# Patient Record
Sex: MALE | Race: WHITE | NOT HISPANIC OR LATINO | Employment: OTHER | URBAN - METROPOLITAN AREA
[De-identification: names, ages, dates, MRNs, and addresses within clinical notes are randomized per-mention and may not be internally consistent; named-entity substitution may affect disease eponyms.]

---

## 2017-04-21 ENCOUNTER — OFFICE VISIT (OUTPATIENT)
Dept: MEDICAL GROUP | Facility: CLINIC | Age: 67
End: 2017-04-21
Payer: MEDICARE

## 2017-04-21 VITALS
HEART RATE: 107 BPM | SYSTOLIC BLOOD PRESSURE: 140 MMHG | BODY MASS INDEX: 18.48 KG/M2 | HEIGHT: 71 IN | RESPIRATION RATE: 18 BRPM | WEIGHT: 132 LBS | DIASTOLIC BLOOD PRESSURE: 82 MMHG | TEMPERATURE: 98.4 F | OXYGEN SATURATION: 95 %

## 2017-04-21 DIAGNOSIS — J44.9 CHRONIC OBSTRUCTIVE PULMONARY DISEASE, UNSPECIFIED COPD TYPE (HCC): ICD-10-CM

## 2017-04-21 DIAGNOSIS — R09.81 NASAL CONGESTION: ICD-10-CM

## 2017-04-21 PROCEDURE — 4040F PNEUMOC VAC/ADMIN/RCVD: CPT | Performed by: INTERNAL MEDICINE

## 2017-04-21 PROCEDURE — G8419 CALC BMI OUT NRM PARAM NOF/U: HCPCS | Performed by: INTERNAL MEDICINE

## 2017-04-21 PROCEDURE — 1101F PT FALLS ASSESS-DOCD LE1/YR: CPT | Performed by: INTERNAL MEDICINE

## 2017-04-21 PROCEDURE — 99213 OFFICE O/P EST LOW 20 MIN: CPT | Performed by: INTERNAL MEDICINE

## 2017-04-21 PROCEDURE — 1036F TOBACCO NON-USER: CPT | Performed by: INTERNAL MEDICINE

## 2017-04-21 PROCEDURE — G8432 DEP SCR NOT DOC, RNG: HCPCS | Performed by: INTERNAL MEDICINE

## 2017-04-21 NOTE — PROGRESS NOTES
"Subjective:  Jigar is a 66 y.o. male with the following   Past Medical History   Diagnosis Date   • Chronic airway obstruction, not elsewhere classified (CMS-HCC)       Family History   Problem Relation Age of Onset   • Family history unknown: Yes     The patient is on the following medications:   Current outpatient prescriptions:   •  polyethylene glycol 3350 (MIRALAX) Powder, MIX 17 GRAMS IN 8 OUNCES OF LIQUID THEN TAKE BY MOUTH EVERY DAY, Disp: 527 g, Rfl: 4  •  ipratropium-albuterol (DUONEB) 0.5-2.5 (3) MG/3ML nebulizer solution, USE ONE VIAL VIA NEBULIZER EVERY 4 HOURS AS NEEDED FOR SHORTNESS OF BREATH., Disp: 90 mL, Rfl: 2  •  SYMBICORT 160-4.5 MCG/ACT Aerosol, INHALE TWO PUFFS BY MOUTH TWICE DAILY, Disp: 22 g, Rfl: 4  •  PROAIR  (90 BASE) MCG/ACT Aero Soln inhalation aerosol, INHALE TWO PUFFS BY MOUTH EVERY SIX HOURS AS NEEDED FOR SHORTNESS OF BREATH, Disp: 9 g, Rfl: 2  •  acyclovir (ZOVIRAX) 5 % Ointment, Apply 1 Application to affected area(s) every 3 hours., Disp: 1 Tube, Rfl: 3  •  oxycodone immediate-release (ROXICODONE) 5 MG Tab, Take 10 mg by mouth every four hours as needed for Severe Pain., Disp: , Rfl:   •  aspirin 81 MG tablet, Take 81 mg by mouth every day., Disp: , Rfl:   •  Nutritional Supplements (ENSURE NUTRITION SHAKE) LIQD, Take 1 Bottle by mouth every day., Disp: 24 Bottle, Rfl: 12    HPI; 66-year-old male patient of Dr. Funes concerned about recent onset of shortness of breath and nasal congestion, he has been on oral inhalers for COPD denies having cough, fever or chills.   ROS:  See HPI    Blood pressure 140/82, pulse 107, temperature 36.9 °C (98.4 °F), resp. rate 18, height 1.803 m (5' 10.98\"), weight 59.875 kg (132 lb), SpO2 95 %.on RA  Objective:  Patient is well appearing and in no acute distress.  Pharynx is clear.  Neck is soft and supple with no cervical or supraclavicular lymphadenopathy, thyromegaly or masses, no JVD.  Lungs clear to auscultation bilaterally with " normal respiratory effort. Abdomen soft, non-tender on palpation,not distended. Heart regular rate and rhythm without murmur. Extremities without any clubbing, cyanosis, or edema.    Assessment and Plan:  1. Recent acute onset of shortness of breath/ nasal congestion; patient with history of COPD on oral inhalers including Symbicort and prior had a sudden onset of nasal congestion and difficulty with breathing 3 days ago, today is feeling better, lungs are clear to auscultation, pulse ox 95% on room air at rest and 93% with ambulation, not in apparent distress.       Patient is advised on preventive and supportive care regarding regarding nasal congestion and acute onset of shortness of breath, alternative therapies, oral antihistamine and saline spray for nasal congestion, continue with the inhaler as needed, in case of recurrent symptoms or worsening return for evaluation.    Please note that this dictation was created using voice recognition software. I have worked with consultants from the vendor as well as technical experts from [facility] to optimize the interface. I have made every reasonable attempt to correct obvious errors, but I expect that there are errors of grammar and possibly content that I did not discover before finalizing the note.

## 2017-04-21 NOTE — MR AVS SNAPSHOT
"        Jigar Kirkland   2017 3:00 PM   Office Visit   MRN: [id number]    Department:  [facility]   Dept Phone:  [phone]    Description:  Male : 1950   Provider:  Keith Carrillo M.D.           Allergies as of 2017     No Known Allergies      Vital Signs     Blood Pressure Pulse Temperature Respirations Height Weight    140/82 mmHg 107 36.9 °C (98.4 °F) 18 1.803 m (5' 10.98\") 59.875 kg (132 lb)    Body Mass Index Oxygen Saturation Smoking Status             18.42 kg/m2 95% Former Smoker         Basic Information     Date Of Birth Sex Race Ethnicity Preferred Language    1950 Male White Non- English      Your appointments     2017  1:20 PM   Established Patient Pul with REGGIE Robin   [facility] (--)    [address]  [address]  [facility] [id number] [phone]              Problem List              ICD-10-CM Priority Class Noted - Resolved    Unintentional weight loss R63.4   2014 - Present    Cluster headaches G44.009   2014 - Present    Neck mass R22.1   2014 - Present    COPD (chronic obstructive pulmonary disease) (CMS-HCC) J44.9   2014 - Present    Aortic aneurysm (CMS-HCC) I71.9   2014 - Present    Cough R05   2014 - Present    Need for Zostavax administration Z23   10/9/2015 - Present    Herpes labialis B00.1   10/9/2015 - Present      Health Maintenance        Date Due Completion Dates    IMM DTaP/Tdap/Td Vaccine (1 - Tdap) 1968 ---    IMM ZOSTER VACCINE 2009 ---    IMM PNEUMOCOCCAL 65+ (ADULT) LOW/MEDIUM RISK SERIES (2 of 2 - PCV13) 2015    COLONOSCOPY 2024            Current Immunizations     Influenza Vaccine Adult HD 10/9/2015, 2014  1:35 PM    Pneumococcal polysaccharide vaccine (PPSV-23) 2014      Below and/or attached are the medications your provider expects you to take. Review all of your home medications and newly ordered " medications with your provider and/or pharmacist. Follow medication instructions as directed by your provider and/or pharmacist. Please keep your medication list with you and share with your provider. Update the information when medications are discontinued, doses are changed, or new medications (including over-the-counter products) are added; and carry medication information at all times in the event of emergency situations     Allergies:  No Known Allergies          Medications  Valid as of: April 21, 2017 -  3:25 PM    Generic Name Brand Name Tablet Size Instructions for use    Acyclovir (Ointment) ZOVIRAX 5 % Apply 1 Application to affected area(s) every 3 hours.        Albuterol Sulfate (Aero Soln) PROAIR  (90 BASE) MCG/ACT INHALE TWO PUFFS BY MOUTH EVERY SIX HOURS AS NEEDED FOR SHORTNESS OF BREATH        Aspirin (Tab) aspirin 81 MG Take 81 mg by mouth every day.        Budesonide-Formoterol Fumarate (Aerosol) SYMBICORT 160-4.5 MCG/ACT INHALE TWO PUFFS BY MOUTH TWICE DAILY        Ipratropium-Albuterol (Solution) DUONEB 0.5-2.5 (3) MG/3ML USE ONE VIAL VIA NEBULIZER EVERY 4 HOURS AS NEEDED FOR SHORTNESS OF BREATH.        Nutritional Supplements (Liquid) ENSURE NUTRITION SHAKE  Take 1 Bottle by mouth every day.        OxyCODONE HCl (Tab) ROXICODONE 5 MG Take 10 mg by mouth every four hours as needed for Severe Pain.        Polyethylene Glycol 3350 (Powder) MIRALAX  MIX 17 GRAMS IN 8 OUNCES OF LIQUID THEN TAKE BY MOUTH EVERY DAY        .                 Medicines prescribed today were sent to:     [id number] - [place], [place] - [address] [facility]    [id number] [place] [address] [place] [address]    Phone: [phone] Fax: [phone]    Open 24 Hours?: No      Medication refill instructions:       If your prescription bottle indicates you have medication refills left, it is not necessary to call your provider’s office. Please contact your pharmacy and they will refill your medication.    If your prescription  bottle indicates you do not have any refills left, you may request refills at any time through one of the following ways: The online [employer] system (except Urgent Care), by calling your provider’s office, or by asking your pharmacy to contact your provider’s office with a refill request. Medication refills are processed only during regular business hours and may not be available until the next business day. Your provider may request additional information or to have a follow-up visit with you prior to refilling your medication.   *Please Note: Medication refills are assigned a new Rx number when refilled electronically. Your pharmacy may indicate that no refills were authorized even though a new prescription for the same medication is available at the pharmacy. Please request the medicine by name with the pharmacy before contacting your provider for a refill.           MyChart Status: Patient Declined

## 2017-04-24 VITALS
HEART RATE: 91 BPM | BODY MASS INDEX: 18.48 KG/M2 | RESPIRATION RATE: 16 BRPM | SYSTOLIC BLOOD PRESSURE: 122 MMHG | WEIGHT: 132 LBS | HEIGHT: 71 IN | TEMPERATURE: 97.3 F | DIASTOLIC BLOOD PRESSURE: 88 MMHG

## 2017-04-27 ENCOUNTER — OFFICE VISIT (OUTPATIENT)
Dept: PULMONOLOGY | Facility: HOSPICE | Age: 67
End: 2017-04-27
Payer: MEDICARE

## 2017-04-27 VITALS
BODY MASS INDEX: 18.76 KG/M2 | RESPIRATION RATE: 16 BRPM | WEIGHT: 134 LBS | SYSTOLIC BLOOD PRESSURE: 102 MMHG | HEART RATE: 86 BPM | OXYGEN SATURATION: 96 % | DIASTOLIC BLOOD PRESSURE: 86 MMHG | HEIGHT: 71 IN

## 2017-04-27 DIAGNOSIS — J44.9 CHRONIC OBSTRUCTIVE PULMONARY DISEASE, UNSPECIFIED COPD TYPE (HCC): ICD-10-CM

## 2017-04-27 DIAGNOSIS — I71.9 AORTIC ANEURYSM WITHOUT RUPTURE, UNSPECIFIED PORTION OF AORTA (HCC): ICD-10-CM

## 2017-04-27 PROCEDURE — 1036F TOBACCO NON-USER: CPT | Performed by: NURSE PRACTITIONER

## 2017-04-27 PROCEDURE — G8420 CALC BMI NORM PARAMETERS: HCPCS | Performed by: NURSE PRACTITIONER

## 2017-04-27 PROCEDURE — G8432 DEP SCR NOT DOC, RNG: HCPCS | Performed by: NURSE PRACTITIONER

## 2017-04-27 PROCEDURE — 1101F PT FALLS ASSESS-DOCD LE1/YR: CPT | Performed by: NURSE PRACTITIONER

## 2017-04-27 PROCEDURE — 99214 OFFICE O/P EST MOD 30 MIN: CPT | Performed by: NURSE PRACTITIONER

## 2017-04-27 PROCEDURE — 4040F PNEUMOC VAC/ADMIN/RCVD: CPT | Performed by: NURSE PRACTITIONER

## 2017-04-27 RX ORDER — BUDESONIDE AND FORMOTEROL FUMARATE DIHYDRATE 160; 4.5 UG/1; UG/1
2 AEROSOL RESPIRATORY (INHALATION) 2 TIMES DAILY
Qty: 1 INHALER | Refills: 11 | Status: SHIPPED | OUTPATIENT
Start: 2017-04-27 | End: 2018-03-19

## 2017-04-27 RX ORDER — METHYLPREDNISOLONE 4 MG/1
TABLET ORAL
Qty: 21 TAB | Refills: 0 | Status: SHIPPED | OUTPATIENT
Start: 2017-04-27 | End: 2017-07-20 | Stop reason: SDUPTHER

## 2017-04-27 RX ORDER — ZOLPIDEM TARTRATE 5 MG/1
5 TABLET ORAL NIGHTLY PRN
Qty: 3 TAB | Refills: 0 | Status: SHIPPED | OUTPATIENT
Start: 2017-04-27 | End: 2017-07-17 | Stop reason: SDUPTHER

## 2017-04-27 RX ORDER — AZITHROMYCIN 250 MG/1
TABLET, FILM COATED ORAL
Qty: 6 TAB | Refills: 0 | Status: SHIPPED | OUTPATIENT
Start: 2017-04-27 | End: 2017-05-15 | Stop reason: SDUPTHER

## 2017-04-27 RX ORDER — ALBUTEROL SULFATE 90 UG/1
2 AEROSOL, METERED RESPIRATORY (INHALATION) EVERY 6 HOURS PRN
Qty: 9 G | Refills: 2 | Status: SHIPPED | OUTPATIENT
Start: 2017-04-27 | End: 2017-11-21 | Stop reason: SDUPTHER

## 2017-04-27 NOTE — PATIENT INSTRUCTIONS
1) Continue Symbicort 160/4.5,   2) Clean mask and supplies weekly and change them as insurance allows  3) Vaccines: Up to date with Pneumovax 23  4) Return in about 2 months (around 6/27/2017) for review of symptoms, if not sooner, follow up with LU Voss.   5) Home sleep study

## 2017-04-27 NOTE — MR AVS SNAPSHOT
"        Jigar Kirkland   2017 1:20 PM   Office Visit   MRN: [id number]    Department:  [facility]   Dept Phone:  [phone]    Description:  Male : 1950   Provider:  REGGIE Robin           Reason for Visit     Follow-Up           Allergies as of 2017     Allergen Noted Reactions    Spiriva 2017         You were diagnosed with     Chronic obstructive pulmonary disease, unspecified COPD type (CMS-HCC)   [[id number]]         Vital Signs     Blood Pressure Pulse Respirations Height Weight Body Mass Index    102/86 mmHg 86 16 1.803 m (5' 10.98\") 60.782 kg (134 lb) 18.70 kg/m2    Oxygen Saturation Smoking Status                96% Former Smoker          Basic Information     Date Of Birth Sex Race Ethnicity Preferred Language    1950 Male White Non- English      Your appointments     2017 11:20 AM   Established Patient Pul with REGGIE Robin   [facility] (--)    [address] [facility] [address]  [facility] [id number] [phone]              Problem List              ICD-10-CM Priority Class Noted - Resolved    Unintentional weight loss R63.4   2014 - Present    Cluster headaches G44.009   2014 - Present    Neck mass R22.1   2014 - Present    COPD (chronic obstructive pulmonary disease) (CMS-HCC) J44.9   2014 - Present    Aortic aneurysm (CMS-HCC) I71.9   2014 - Present    Cough R05   2014 - Present    Need for Zostavax administration Z23   10/9/2015 - Present    Herpes labialis B00.1   10/9/2015 - Present      Health Maintenance        Date Due Completion Dates    IMM DTaP/Tdap/Td Vaccine (1 - Tdap) 1968 ---    IMM ZOSTER VACCINE 2009 ---    IMM PNEUMOCOCCAL 65+ (ADULT) LOW/MEDIUM RISK SERIES (2 of 2 - PCV13) 2015    COLONOSCOPY 2024            Current Immunizations     Influenza Vaccine Adult HD 10/9/2015, 2014  1:35 PM    Pneumococcal polysaccharide " vaccine (PPSV-23) 9/22/2014      Below and/or attached are the medications your provider expects you to take. Review all of your home medications and newly ordered medications with your provider and/or pharmacist. Follow medication instructions as directed by your provider and/or pharmacist. Please keep your medication list with you and share with your provider. Update the information when medications are discontinued, doses are changed, or new medications (including over-the-counter products) are added; and carry medication information at all times in the event of emergency situations     Allergies:  SPIRIVA - (reactions not documented)               Medications  Valid as of: April 27, 2017 -  1:59 PM    Generic Name Brand Name Tablet Size Instructions for use    Acyclovir (Ointment) ZOVIRAX 5 % Apply 1 Application to affected area(s) every 3 hours.        Albuterol Sulfate (Aero Soln) albuterol 108 (90 BASE) MCG/ACT Inhale 2 Puffs by mouth every 6 hours as needed.        Aspirin (Tab) aspirin 81 MG Take 81 mg by mouth every day.        Azithromycin (Tab) ZITHROMAX 250 MG Take 2 tablets on day 1, then take 1 tablet a day for 4 days.        Budesonide-Formoterol Fumarate (Aerosol) SYMBICORT 160-4.5 MCG/ACT Inhale 2 Puffs by mouth 2 Times a Day.        Ipratropium-Albuterol (Solution) DUONEB 0.5-2.5 (3) MG/3ML USE ONE VIAL VIA NEBULIZER EVERY 4 HOURS AS NEEDED FOR SHORTNESS OF BREATH.        MethylPREDNISolone (Tablet Therapy Pack) MEDROL DOSEPAK 4 MG Instructions per package        Nutritional Supplements (Liquid) ENSURE NUTRITION SHAKE  Take 1 Bottle by mouth every day.        OxyCODONE HCl (Tab) ROXICODONE 5 MG Take 10 mg by mouth every four hours as needed for Severe Pain.        Polyethylene Glycol 3350 (Powder) MIRALAX  MIX 17 GRAMS IN 8 OUNCES OF LIQUID THEN TAKE BY MOUTH EVERY DAY        Zolpidem Tartrate (Tab) AMBIEN 5 MG Take 1 Tab by mouth at bedtime as needed for Sleep (1 to 3 po qhs prn insomnia/sleep  study. Bring to sleep study.).        .                 Medicines prescribed today were sent to:     [id number] - [place], [place] [address] [address]    [address] [place] [address]    Phone: [phone] Fax: [phone]    Open 24 Hours?: No      Medication refill instructions:       If your prescription bottle indicates you have medication refills left, it is not necessary to call your provider’s office. Please contact your pharmacy and they will refill your medication.    If your prescription bottle indicates you do not have any refills left, you may request refills at any time through one of the following ways: The online [employer] system (except Urgent Care), by calling your provider’s office, or by asking your pharmacy to contact your provider’s office with a refill request. Medication refills are processed only during regular business hours and may not be available until the next business day. Your provider may request additional information or to have a follow-up visit with you prior to refilling your medication.   *Please Note: Medication refills are assigned a new Rx number when refilled electronically. Your pharmacy may indicate that no refills were authorized even though a new prescription for the same medication is available at the pharmacy. Please request the medicine by name with the pharmacy before contacting your provider for a refill.        Your To Do List     Future Labs/Procedures Complete By Expires    OVERNIGHT HOME SLEEP STUDY  As directed 4/27/2018      Instructions    1) Continue Symbicort 160/4.5,   2) Clean mask and supplies weekly and change them as insurance allows  3) Vaccines: Up to date with Pneumovax 23  4) Return in about 2 months (around 6/27/2017) for review of symptoms, if not sooner, follow up with LU Voss.   5) Home sleep study              MyChart Status: Patient Declined

## 2017-04-27 NOTE — PROGRESS NOTES
CC:  Here for f/u pulmonary issues as listed below    HPI:   Jigar presents today for follow up for emphysema. PFTs from 2015 indicate a Fev1 of 1.71L or 48% predicted without bronchodilator response, Fev1/FVC ratio of 41, DLCO 58%. He is a smoker of 45 pack years who quit almost 2 years ago. Had CAT scan performed September 1, 2015 with no evidence of thrombosis however demonstrates severe emphysema. Patient has not been seen since one year ago and required his antibiotic and Medrol emergency one time. Apparently one week ago he was getting out of the shower and acutely had difficulty breathing with feelings of his neck being squeezed. He reports that his spouse was not near him he would have been on the floor. He attempted his nebulizer and rescue inhaler without benefit at that time. He refused to go to the hospital. Symptoms eventually went away and have not occurred since that time. However, he is afraid of taking a shower again. He saw his primary who felt the incident was allergy related and gave him Claritin which seemed to help per the patient. He is case with Dr. Tillman who does not believe this is pulmonary related and may be related to anxiety. Patient has a history of a aneurysm and encouraged to follow-up with cardiology. In general patient is using Symbicort 160-4.52 puffs twice a day with mouth rinse, nebulizer as needed with sickness, rescue inhaler rarely.    Patient is currently sleeping 2-3 hours then he wakes, because of tinnitus to go bathroom and with 2-3x nighttime awakenings. They sometimes have trouble falling asleep.  They do not feel refreshed in the morning and denies morning H/A. They feel tired throughout the day and tries naps for appx 1 hour long.  Patient reports snoring, apnea events and paroxysmal nocturnal dyspnea events. They have never fallen asleep in conversation, at the wheel, or at work.  They deny sleepwalking/talking. Patient was highly recommended complete a sleep  study, but declined an in lab study, reporting that he he would rather see his cardiologist before completing it. Reviewed pathophysiology of untreated SAMUEL including cardiac and neurological risk factors. Amenable to a home sleep study.      Patient Active Problem List    Diagnosis Date Noted   • Need for Zostavax administration 10/09/2015   • Herpes labialis 10/09/2015   • Cough 12/31/2014   • Aortic aneurysm (CMS-HCC) 11/04/2014   • Unintentional weight loss 09/22/2014   • Cluster headaches 09/22/2014   • Neck mass 09/22/2014   • COPD (chronic obstructive pulmonary disease) (CMS-HCC) 09/22/2014       Past Medical History   Diagnosis Date   • Chronic airway obstruction, not elsewhere classified (CMS-HCC)    • Bronchitis    • COPD (chronic obstructive pulmonary disease) (CMS-HCC)    • Aortic aneurysm (CMS-HCC)        Past Surgical History   Procedure Laterality Date   • Knee arthroscopy     • Knee orif         Family History   Problem Relation Age of Onset   • Cancer Father      Type unspec.   • Cancer Mother      Type unspec.       Social History   Substance Use Topics   • Smoking status: Former Smoker -- 1.00 packs/day for 45 years     Types: Cigarettes   • Smokeless tobacco: Never Used   • Alcohol Use: No       Current Outpatient Prescriptions   Medication Sig Dispense Refill   • MethylPREDNISolone (MEDROL DOSEPAK) 4 MG Tablet Therapy Pack Instructions per package 21 Tab 0   • azithromycin (ZITHROMAX) 250 MG Tab Take 2 tablets on day 1, then take 1 tablet a day for 4 days. 6 Tab 0   • albuterol (PROAIR HFA) 108 (90 BASE) MCG/ACT Aero Soln inhalation aerosol Inhale 2 Puffs by mouth every 6 hours as needed. 9 g 2   • budesonide-formoterol (SYMBICORT) 160-4.5 MCG/ACT Aerosol Inhale 2 Puffs by mouth 2 Times a Day. 1 Inhaler 11   • zolpidem (AMBIEN) 5 MG Tab Take 1 Tab by mouth at bedtime as needed for Sleep (1 to 3 po qhs prn insomnia/sleep study. Bring to sleep study.). 3 Tab 0   • polyethylene glycol 3350 (MIRALAX)  "Powder MIX 17 GRAMS IN 8 OUNCES OF LIQUID THEN TAKE BY MOUTH EVERY  g 4   • ipratropium-albuterol (DUONEB) 0.5-2.5 (3) MG/3ML nebulizer solution USE ONE VIAL VIA NEBULIZER EVERY 4 HOURS AS NEEDED FOR SHORTNESS OF BREATH. 90 mL 2   • Nutritional Supplements (ENSURE NUTRITION SHAKE) LIQD Take 1 Bottle by mouth every day. 24 Bottle 12   • acyclovir (ZOVIRAX) 5 % Ointment Apply 1 Application to affected area(s) every 3 hours. 1 Tube 3   • oxycodone immediate-release (ROXICODONE) 5 MG Tab Take 10 mg by mouth every four hours as needed for Severe Pain.     • aspirin 81 MG tablet Take 81 mg by mouth every day.       No current facility-administered medications for this visit.          Allergies: Spiriva          ROS   Gen: Denies fever, chills, unintentional weight loss, fatigue, night sweats  E/N/T: Denies nasal congestion, ear pain  Resp:Denies Dyspnea, wheezing, cough, sputum production, hemoptysis  CV: Denies chest pain, chest tightness, palpitations  Sleep:Denies morning headache  Neuro: Denies frequent headaches, weakness, dizziness  GI: Denies acid reflux, N/V  See HPI.  All other systems reviewed and negative          Vital signs for this encounter:  Filed Vitals:    04/27/17 1308   Height: 1.803 m (5' 10.98\")   Weight: 60.782 kg (134 lb)   Weight % change since last entry.: 0 %   BP: 102/86   Pulse: 86   BMI (Calculated): 18.7   Resp: 16                 Physical Exam:   Appearance: well developed, well nourished, no acute distress.   Eyes: PERRL, EOM intact, sclere white, conjunctiva moist.  Ears: no lesions or deformities.  Hearing: grossly intact.  Nose: no lesions or deformities.  Dentition: good dentition.   Oropharynx: tongue normal, posterior pharynx without erythema or exudate.  Neck: supple, trachea midline, no masses.  Respiratory effort: no intercostal retractions or use of accessory muscles.  Lung auscultation: Bilateral diminished   Heart auscultation: no murmur, rub, or gallop   Extremities: no " cyanosis or edema.  Abdomen: soft, non-tender, no masses.  Gait and station: without difficulty   Digits and Nails: no clubbing, cyanosis, petechiae, or nodes.  Cranial nerves: grossly normal.  Motor: no focal deficits observed.   Skin: no rashes, lesions, or ulcers noted.  Orientation: oriented to time, place, and person.  Mood and affect: mood and affect appropriate, normal interaction with examiner.      Assessment   1. Chronic obstructive pulmonary disease, unspecified COPD type (CMS-[facility])  MethylPREDNISolone (MEDROL DOSEPAK) 4 MG Tablet Therapy Pack    azithromycin (ZITHROMAX) 250 MG Tab    albuterol (PROAIR HFA) 108 (90 BASE) MCG/ACT Aero Soln inhalation aerosol    budesonide-formoterol (SYMBICORT) 160-4.5 MCG/ACT Aerosol    zolpidem (AMBIEN) 5 MG Tab    OVERNIGHT HOME SLEEP STUDY    CANCELED: POLYSOMNOGRAPHY, 4 OR MORE   2. Aortic aneurysm without rupture, unspecified portion of aorta (CMS-HCC)         PLAN:   Patient Instructions   1) Continue Symbicort 160/4.5,   2) Vaccines: Up to date with Pneumovax 23  3) Return in about 2 months (around 6/27/2017) for review of symptoms, if not sooner, follow up with LU Voss.   4) Home sleep study

## 2017-05-13 ENCOUNTER — PATIENT OUTREACH (OUTPATIENT)
Dept: HEALTH INFORMATION MANAGEMENT | Facility: OTHER | Age: 67
End: 2017-05-13

## 2017-05-13 NOTE — PROGRESS NOTES
Outcome: Requested to Call us Back -- Patient needs to coordinate his schedule with his wife's.    WebIZ Checked & Epic Updated:   yes    [facility] Verified: no    Attempt # 1st

## 2017-05-15 DIAGNOSIS — R09.89 CHEST CONGESTION: ICD-10-CM

## 2017-05-15 DIAGNOSIS — J44.9 CHRONIC OBSTRUCTIVE PULMONARY DISEASE, UNSPECIFIED COPD TYPE (HCC): ICD-10-CM

## 2017-05-17 DIAGNOSIS — J44.1 CHRONIC OBSTRUCTIVE PULMONARY DISEASE WITH ACUTE EXACERBATION (HCC): ICD-10-CM

## 2017-05-17 RX ORDER — AZITHROMYCIN 250 MG/1
TABLET, FILM COATED ORAL
Qty: 6 TAB | Refills: 0 | Status: SHIPPED | OUTPATIENT
Start: 2017-05-17 | End: 2017-07-20

## 2017-05-17 RX ORDER — METHYLPREDNISOLONE 4 MG/1
TABLET ORAL
Qty: 21 TAB | Refills: 0 | OUTPATIENT
Start: 2017-05-17

## 2017-05-17 NOTE — PROGRESS NOTES
"Spoke with patient. He continues to have \"throat squeezing\" sensation.  Requesting another round of antibiotics and medrol pack as he had relief with them.  He continues to cough up thick mucus.  Will order another zpack and chest CT for further evaluation of symptoms.  Also enforced trialing x 2 weeks of acid reflux medication, which he was amendable to.   "

## 2017-05-17 NOTE — TELEPHONE ENCOUNTER
Patient is calling stating he is not feeling better since he was seen by Iman Culver on 4/27/2017. He is wondering if he could have another round of antibiotics and medrol dospak sent to his pharmacy.    Please Advise

## 2017-05-24 NOTE — PROGRESS NOTES
5/24/17 -  Outcome: Pt said that he will call back. He said that he already has all our info and our phone number and declined to verify demographics.    Attempt # 4

## 2017-06-27 ENCOUNTER — TELEPHONE (OUTPATIENT)
Dept: PULMONOLOGY | Facility: HOSPICE | Age: 67
End: 2017-06-27

## 2017-06-27 NOTE — TELEPHONE ENCOUNTER
"Pt has an appt with LU Voss for HST results. Results are not in the system and was not scheduled. I spoke to the patient and offered if he would like to keep appt or reschedule and schedule HST. Patient agreed to reschedule and schedule HST. Patient stated \"he feels as he sleeps fine and doesn't wake up gasping for air\" I advised it is what Iman had ordered and wanted completed the patient agreed. Patient would like address for sleep center mailed conformed address over the phone also address was given during call. Address to sleep center mailed patient aware of time and location of both appts   "

## 2017-07-17 DIAGNOSIS — J44.9 CHRONIC OBSTRUCTIVE PULMONARY DISEASE, UNSPECIFIED COPD TYPE (HCC): ICD-10-CM

## 2017-07-17 DIAGNOSIS — G47.33 OBSTRUCTIVE SLEEP APNEA: ICD-10-CM

## 2017-07-17 RX ORDER — ZOLPIDEM TARTRATE 5 MG/1
5 TABLET ORAL NIGHTLY PRN
Qty: 3 TAB | Refills: 0 | Status: SHIPPED
Start: 2017-07-17 | End: 2017-07-20

## 2017-07-17 NOTE — TELEPHONE ENCOUNTER
Prescription faxed to:    [id number] - [place] - [address] [address]  [address] [address] [address]  Phone: [phone] Fax: [phone]  .

## 2017-07-17 NOTE — TELEPHONE ENCOUNTER
Have we ever prescribed this med? Yes.  If yes, what date? 4/27/2017    Last OV: 4/27/2017    Next OV: 7/20/2017    DX: SAMUEL    Medications: Ambien 5 mg for sleep study

## 2017-07-18 ENCOUNTER — HOME STUDY (OUTPATIENT)
Dept: SLEEP MEDICINE | Facility: MEDICAL CENTER | Age: 67
End: 2017-07-18
Attending: NURSE PRACTITIONER
Payer: MEDICARE

## 2017-07-18 DIAGNOSIS — J44.9 CHRONIC OBSTRUCTIVE PULMONARY DISEASE, UNSPECIFIED COPD TYPE (HCC): ICD-10-CM

## 2017-07-18 PROCEDURE — G0399 HOME SLEEP TEST/TYPE 3 PORTA: HCPCS | Performed by: INTERNAL MEDICINE

## 2017-07-18 NOTE — MR AVS SNAPSHOT
Jigar QIU Marita   2017 3:30 PM   Appointment   MRN: [id number]    Department:  [facility]   Dept Phone:  [phone]    Description:  Male : 1950   Provider:  SLEEP CLINIC           Allergies as of 2017     Allergen Noted Reactions    Spiriva 2017         You were diagnosed with     Chronic obstructive pulmonary disease, unspecified COPD type (CMS-HCC)   [0170249]         Vital Signs     Smoking Status                   Former Smoker           Basic Information     Date Of Birth Sex Race Ethnicity Preferred Language    1950 Male White Non- English      Your appointments     2017  3:30 PM   Sleep Study Home with SLEEP CLINIC   [facility] (--)    [address]  [address]  [place] [address] [phone]            2017  1:20 PM   Established Patient Pul with REGGIE Robin   [facility] (--)    [address]  [address]  [place] [address] [phone]              Problem List              ICD-10-CM Priority Class Noted - Resolved    Unintentional weight loss R63.4   2014 - Present    Cluster headaches G44.009   2014 - Present    Neck mass R22.1   2014 - Present    COPD (chronic obstructive pulmonary disease) (CMS-HCC) J44.9   2014 - Present    Aortic aneurysm (CMS-HCC) I71.9   2014 - Present    Cough R05   2014 - Present    Need for Zostavax administration Z23   10/9/2015 - Present    Herpes labialis B00.1   10/9/2015 - Present      Health Maintenance        Date Due Completion Dates    IMM DTaP/Tdap/Td Vaccine (1 - Tdap) 1968 ---    IMM PNEUMOCOCCAL 65+ (ADULT) LOW/MEDIUM RISK SERIES (2 of 2 - PCV13) 2015    IMM INFLUENZA (1) 2017 10/9/2015, 2014    COLONOSCOPY 2024            Current Immunizations     Influenza Vaccine Adult HD 10/9/2015, 2014  1:35 PM    Pneumococcal polysaccharide vaccine (PPSV-23)  9/22/2014    SHINGLES VACCINE 12/8/2015      Below and/or attached are the medications your provider expects you to take. Review all of your home medications and newly ordered medications with your provider and/or pharmacist. Follow medication instructions as directed by your provider and/or pharmacist. Please keep your medication list with you and share with your provider. Update the information when medications are discontinued, doses are changed, or new medications (including over-the-counter products) are added; and carry medication information at all times in the event of emergency situations     Allergies:  SPIRIVA - (reactions not documented)               Medications  Valid as of: July 18, 2017 -  3:19 PM    Generic Name Brand Name Tablet Size Instructions for use    Acyclovir (Ointment) ZOVIRAX 5 % Apply 1 Application to affected area(s) every 3 hours.        Albuterol Sulfate (Aero Soln) albuterol 108 (90 BASE) MCG/ACT Inhale 2 Puffs by mouth every 6 hours as needed.        Aspirin (Tab) aspirin 81 MG Take 81 mg by mouth every day.        Azithromycin (Tab) ZITHROMAX 250 MG TAKE 2 TABLETS BY MOUTH ON DAY 1 THEN TAKE 1 TABLET ONCE DAILY FOR 4 DAYS        Budesonide-Formoterol Fumarate (Aerosol) SYMBICORT 160-4.5 MCG/ACT Inhale 2 Puffs by mouth 2 Times a Day.        Ipratropium-Albuterol (Solution) DUONEB 0.5-2.5 (3) MG/3ML USE ONE VIAL VIA NEBULIZER EVERY 4 HOURS AS NEEDED FOR SHORTNESS OF BREATH.        MethylPREDNISolone (Tablet Therapy Pack) MEDROL DOSEPAK 4 MG Instructions per package        Nutritional Supplements (Liquid) ENSURE NUTRITION SHAKE  Take 1 Bottle by mouth every day.        OxyCODONE HCl (Tab) ROXICODONE 5 MG Take 10 mg by mouth every four hours as needed for Severe Pain.        Polyethylene Glycol 3350 (Powder) MIRALAX  MIX 17 GRAMS IN 8 OUNCES OF LIQUID THEN TAKE BY MOUTH EVERY DAY        Zolpidem Tartrate (Tab) AMBIEN 5 MG Take 1 Tab by mouth at bedtime as needed for Sleep (1 to 3 po  [facility] prn insomnia/sleep study. Bring to sleep study.).        .                 Medicines prescribed today were sent to:     [id number] - [place], [place] - [address] [address]    [address] [place] [address]    Phone: [phone] Fax: [phone]    Open 24 Hours?: No      Medication refill instructions:       If your prescription bottle indicates you have medication refills left, it is not necessary to call your provider’s office. Please contact your pharmacy and they will refill your medication.    If your prescription bottle indicates you do not have any refills left, you may request refills at any time through one of the following ways: The online [employer] system (except Urgent Care), by calling your provider’s office, or by asking your pharmacy to contact your provider’s office with a refill request. Medication refills are processed only during regular business hours and may not be available until the next business day. Your provider may request additional information or to have a follow-up visit with you prior to refilling your medication.   *Please Note: Medication refills are assigned a new Rx number when refilled electronically. Your pharmacy may indicate that no refills were authorized even though a new prescription for the same medication is available at the pharmacy. Please request the medicine by name with the pharmacy before contacting your provider for a refill.           MyChart Status: Patient Declined

## 2017-07-20 ENCOUNTER — OFFICE VISIT (OUTPATIENT)
Dept: PULMONOLOGY | Facility: HOSPICE | Age: 67
End: 2017-07-20
Payer: MEDICARE

## 2017-07-20 VITALS
TEMPERATURE: 98.6 F | HEART RATE: 78 BPM | RESPIRATION RATE: 16 BRPM | BODY MASS INDEX: 18.2 KG/M2 | HEIGHT: 71 IN | DIASTOLIC BLOOD PRESSURE: 62 MMHG | OXYGEN SATURATION: 93 % | WEIGHT: 130 LBS | SYSTOLIC BLOOD PRESSURE: 100 MMHG

## 2017-07-20 DIAGNOSIS — J44.9 CHRONIC OBSTRUCTIVE PULMONARY DISEASE, UNSPECIFIED COPD TYPE (HCC): ICD-10-CM

## 2017-07-20 DIAGNOSIS — R05.9 COUGH: ICD-10-CM

## 2017-07-20 DIAGNOSIS — G47.34 NOCTURNAL OXYGEN DESATURATION: ICD-10-CM

## 2017-07-20 PROCEDURE — 99214 OFFICE O/P EST MOD 30 MIN: CPT | Mod: 25 | Performed by: NURSE PRACTITIONER

## 2017-07-20 PROCEDURE — 90670 PCV13 VACCINE IM: CPT | Performed by: NURSE PRACTITIONER

## 2017-07-20 PROCEDURE — G0009 ADMIN PNEUMOCOCCAL VACCINE: HCPCS | Performed by: NURSE PRACTITIONER

## 2017-07-20 RX ORDER — OXYCODONE HYDROCHLORIDE 10 MG/1
5-10 TABLET ORAL EVERY 8 HOURS PRN
COMMUNITY
Start: 2017-06-22

## 2017-07-20 RX ORDER — SULFAMETHOXAZOLE AND TRIMETHOPRIM 800; 160 MG/1; MG/1
TABLET ORAL
COMMUNITY
Start: 2017-06-12 | End: 2017-08-03

## 2017-07-20 RX ORDER — METHYLPREDNISOLONE 4 MG/1
TABLET ORAL
Qty: 21 TAB | Refills: 0 | Status: SHIPPED | OUTPATIENT
Start: 2017-07-20 | End: 2017-08-03

## 2017-07-20 NOTE — MR AVS SNAPSHOT
"        Jigar QIU Marita   2017 1:20 PM   Office Visit   MRN: [id number]    Department:  [facility]   Dept Phone:  [phone]    Description:  Male : 1950   Provider:  REGGIE Robin           Reason for Visit     COPD Last seen 17       Allergies as of 2017     Allergen Noted Reactions    Bactrim Ds 2017   Shortness of Breath    Spiriva 2017         You were diagnosed with     Nocturnal oxygen desaturation   [036795]       Chronic obstructive pulmonary disease, unspecified COPD type (CMS-HCC)   [7849534]         Vital Signs     Blood Pressure Pulse Temperature Respirations Height Weight    100/62 mmHg 78 37 °C (98.6 °F) 16 1.803 m (5' 10.98\") 58.968 kg (130 lb)    Body Mass Index Oxygen Saturation Smoking Status             18.14 kg/m2 93% Former Smoker         Basic Information     Date Of Birth Sex Race Ethnicity Preferred Language    1950 Male White Non- English      Your appointments     Sep 15, 2017  2:00 PM   Pulmonary Function Test with PFT-RM1   [facility] (--)    [address]  [address]  [place] [address] [phone]            Sep 22, 2017  2:20 PM   Established Patient Pul with REGGIE Robin   [facility] (--)    [address]  [address]  [place] [id number] [phone]              Problem List              ICD-10-CM Priority Class Noted - Resolved    Unintentional weight loss R63.4   2014 - Present    Cluster headaches G44.009   2014 - Present    Neck mass R22.1   2014 - Present    COPD (chronic obstructive pulmonary disease) (CMS-HCC) J44.9   2014 - Present    Aortic aneurysm (CMS-HCC) I71.9   2014 - Present    Cough R05   2014 - Present    Need for Zostavax administration Z23   10/9/2015 - Present    Herpes labialis B00.1   10/9/2015 - Present      Health Maintenance        Date Due Completion Dates    IMM DTaP/Tdap/Td Vaccine (1 - " Tdap) 5/8/1968 ---    IMM PNEUMOCOCCAL 65+ (ADULT) LOW/MEDIUM RISK SERIES (2 of 2 - PCV13) 9/22/2015 9/22/2014    IMM INFLUENZA (1) 9/1/2017 10/9/2015, 11/4/2014    COLONOSCOPY 12/2/2024 12/2/2014            Current Immunizations     13-VALENT PCV PREVNAR  Incomplete    Influenza Vaccine Adult HD 10/9/2015, 11/4/2014  1:35 PM    Pneumococcal polysaccharide vaccine (PPSV-23) 9/22/2014    SHINGLES VACCINE 12/8/2015      Below and/or attached are the medications your provider expects you to take. Review all of your home medications and newly ordered medications with your provider and/or pharmacist. Follow medication instructions as directed by your provider and/or pharmacist. Please keep your medication list with you and share with your provider. Update the information when medications are discontinued, doses are changed, or new medications (including over-the-counter products) are added; and carry medication information at all times in the event of emergency situations     Allergies:  BACTRIM DS - Shortness of Breath     SPIRIVA - (reactions not documented)               Medications  Valid as of: July 20, 2017 -  2:11 PM    Generic Name Brand Name Tablet Size Instructions for use    Acyclovir (Ointment) ZOVIRAX 5 % Apply 1 Application to affected area(s) every 3 hours.        Albuterol Sulfate (Aero Soln) albuterol 108 (90 BASE) MCG/ACT Inhale 2 Puffs by mouth every 6 hours as needed.        Aspirin (Tab) aspirin 81 MG Take 81 mg by mouth every day.        Budesonide-Formoterol Fumarate (Aerosol) SYMBICORT 160-4.5 MCG/ACT Inhale 2 Puffs by mouth 2 Times a Day.        Ipratropium-Albuterol (Solution) DUONEB 0.5-2.5 (3) MG/3ML USE ONE VIAL VIA NEBULIZER EVERY 4 HOURS AS NEEDED FOR SHORTNESS OF BREATH.        MethylPREDNISolone (Tablet Therapy Pack) MEDROL DOSEPAK 4 MG Instructions per package        Nutritional Supplements (Liquid) ENSURE NUTRITION SHAKE  Take 1 Bottle by mouth every day.        OxyCODONE HCl (Tab)  ROXICODONE 5 MG Take 10 mg by mouth every four hours as needed for Severe Pain.        OxyCODONE HCl (Tab) ROXICODONE 10 MG         Polyethylene Glycol 3350 (Powder) MIRALAX  MIX 17 GRAMS IN 8 OUNCES OF LIQUID THEN TAKE BY MOUTH EVERY DAY        Sulfamethoxazole-Trimethoprim (Tab) BACTRIM -160 MG         .                 Medicines prescribed today were sent to:     [id number] - [place], [place] - [address] [address]    [address] [place] [address]    Phone: [phone] Fax: [phone]    Open 24 Hours?: No      Medication refill instructions:       If your prescription bottle indicates you have medication refills left, it is not necessary to call your provider’s office. Please contact your pharmacy and they will refill your medication.    If your prescription bottle indicates you do not have any refills left, you may request refills at any time through one of the following ways: The online [employer] system (except Urgent Care), by calling your provider’s office, or by asking your pharmacy to contact your provider’s office with a refill request. Medication refills are processed only during regular business hours and may not be available until the next business day. Your provider may request additional information or to have a follow-up visit with you prior to refilling your medication.   *Please Note: Medication refills are assigned a new Rx number when refilled electronically. Your pharmacy may indicate that no refills were authorized even though a new prescription for the same medication is available at the pharmacy. Please request the medicine by name with the pharmacy before contacting your provider for a refill.        Your To Do List     Future Labs/Procedures Complete By Expires    AMB PULMONARY FUNCTION TEST/LAB  As directed 7/20/2018      Instructions    1) Continue Symbicort 160/4.5 2 puffs, twice a day. Continue rescue and nebulizer as needed    2) Vaccines: Up to date with Pneumovax  23. Prevnar 13 today  3) Start using oxygen at 2L nocturnally  4) Medrol pack for exacerbation  5) Return in about 2 months (around 9/20/2017) for review of symptoms, if not sooner, follow up with [facility] Voss, pulmonary function results.   6) Lung cancer screening referral placed               MyChart Status: Patient Declined

## 2017-07-20 NOTE — PROCEDURES
Interpretation:    This home sleep study was performed on 7/18/2017. The recording duration was 7 hours and 20 minutes, the flow evaluation duration was 7 hours and 5 minutes, and the oxygen saturation evaluation duration was 7 hours and 4 minutes.    No significant sleep disordered breathing was found. The apnea hypopnea index was 3.2 which is normal. The patient's saturations were less than 90% for 97% of the recording and saturations were as low as 82%. The saturations are less than or equal to 88% for 5 hours and 49 minutes. The heart rate varied between 62 and 133 with an average of 86 bpm. The patient slept mostly in the prone and right side position. Sporadic snoring was noted throughout the study.    Recommendation:    There is no indication for positive airway pressure. The patient may benefit from the administration of supplemental nocturnal oxygen.

## 2017-07-20 NOTE — PATIENT INSTRUCTIONS
1) Continue Symbicort 160/4.5 2 puffs, twice a day. Continue rescue and nebulizer as needed    2) Vaccines: Up to date with Pneumovax 23. Prevnar 13 today  3) Start using oxygen at 2L nocturnally  4) Medrol pack for exacerbation  5) Return in about 2 months (around 9/20/2017) for review of symptoms, if not sooner, follow up with [facility] Voss, pulmonary function results.   6) Lung cancer screening referral placed

## 2017-07-20 NOTE — PROGRESS NOTES
CC:  Here for f/u sleep and pulmonary issues as listed below    HPI:   Jigar presents today for follow up for emphysema and home sleep study results. PFTs from 2015 indicate a Fev1 of 1.71L or 48% predicted without bronchodilator response, Fev1/FVC ratio of 41, DLCO 58%. He is a smoker of 45 pack years who quit almost 2 years ago. Had CAT scan performed September 1, 2015 with no evidence of thrombosis however demonstrates severe emphysema.  Patient has a history of a aneurysm and encouraged to follow-up with cardiology. Patient is compliant using Symbicort 160-4.52 puffs twice a day with mouth rinse, has been using his rescue inhaler 3x/day since the [employer], and nebulizer. He complains of increased shortness of breath with failure smoke. He continues to have a cough. He trialed Prilosec for 2 weeks without any change. He does have nasal drainage with clear mucus. He denies wheeze, chest pain or chest tightness, fever and chills.    HSS from 7/2017 indicated an AHI of 3.2 and low oxygen of 82% with time below 88% of 5 hours and 49 minutes. He would benefit from the addition of 2 L of oxygen while sleeping.Patient is currently sleeping 2-3 hours then he wakes, because of tinnitus to go bathroom and with 2-3x nighttime awakenings. They sometimes have trouble falling asleep.  They do not feel refreshed in the morning and denies morning H/A. They feel tired throughout the day and tries naps for appx 1 hour long.  Patient reports snoring, apnea events and paroxysmal nocturnal dyspnea events. They have never fallen asleep in conversation, at the wheel, or at work.  They deny sleepwalking/talking.           Patient Active Problem List    Diagnosis Date Noted   • Nocturnal oxygen desaturation 07/20/2017   • Need for Zostavax administration 10/09/2015   • Herpes labialis 10/09/2015   • Cough 12/31/2014   • Aortic aneurysm (CMS-[facility]) 11/04/2014   • Unintentional weight loss 09/22/2014   • Cluster headaches 09/22/2014    • Neck mass 09/22/2014   • COPD (chronic obstructive pulmonary disease) (CMS-HCC) 09/22/2014       Past Medical History   Diagnosis Date   • Chronic airway obstruction, not elsewhere classified    • Bronchitis    • COPD (chronic obstructive pulmonary disease) (CMS-HCC)    • Aortic aneurysm (CMS-HCC)        Past Surgical History   Procedure Laterality Date   • Knee arthroscopy     • Knee orif         Family History   Problem Relation Age of Onset   • Cancer Father      Type unspec.   • Cancer Mother      Type unspec.       Social History     Social History   • Marital Status:      Spouse Name: N/A   • Number of Children: N/A   • Years of Education: N/A     Occupational History   • Not on file.     Social History Main Topics   • Smoking status: Former Smoker -- 1.00 packs/day for 45 years     Types: Cigarettes     Quit date: 07/20/2014   • Smokeless tobacco: Never Used   • Alcohol Use: No   • Drug Use: No   • Sexual Activity:     Partners: Female      Comment: Off and on     Other Topics Concern   • Not on file     Social History Narrative       Current Outpatient Prescriptions   Medication Sig Dispense Refill   • MethylPREDNISolone (MEDROL DOSEPAK) 4 MG Tablet Therapy Pack Instructions per package 21 Tab 0   • albuterol (PROAIR HFA) 108 (90 BASE) MCG/ACT Aero Soln inhalation aerosol Inhale 2 Puffs by mouth every 6 hours as needed. 9 g 2   • budesonide-formoterol (SYMBICORT) 160-4.5 MCG/ACT Aerosol Inhale 2 Puffs by mouth 2 Times a Day. 1 Inhaler 11   • polyethylene glycol 3350 (MIRALAX) Powder MIX 17 GRAMS IN 8 OUNCES OF LIQUID THEN TAKE BY MOUTH EVERY  g 4   • ipratropium-albuterol (DUONEB) 0.5-2.5 (3) MG/3ML nebulizer solution USE ONE VIAL VIA NEBULIZER EVERY 4 HOURS AS NEEDED FOR SHORTNESS OF BREATH. 90 mL 2   • oxycodone immediate-release (ROXICODONE) 5 MG Tab Take 10 mg by mouth every four hours as needed for Severe Pain.     • oxycodone immediate release (ROXICODONE) 10 MG immediate release  "tablet      • sulfamethoxazole-trimethoprim (BACTRIM DS) 800-160 MG tablet      • acyclovir (ZOVIRAX) 5 % Ointment Apply 1 Application to affected area(s) every 3 hours. 1 Tube 3   • aspirin 81 MG tablet Take 81 mg by mouth every day.     • Nutritional Supplements (ENSURE NUTRITION SHAKE) LIQD Take 1 Bottle by mouth every day. 24 Bottle 12     No current facility-administered medications for this visit.          Allergies: Bactrim ds and Spiriva      ROS   Gen: Denies fever, chills, unintentional weight loss, fatigue, night sweats  E/N/T: Denies ear pain, nasal congestion  Resp:Denies  wheezing, sputum production, hemoptysis  CV: Denies chest pain, chest tightness, palpitations, BLE edema  Sleep:Denies morning headache, insomnia,apnea  Neuro: Denies frequent headaches, weakness, dizziness  GI: Denies N/V, acid reflux/heartburn  See HPI.  All other systems reviewed and negative      Vital signs for this encounter:  Filed Vitals:    07/20/17 1314   Height: 1.803 m (5' 10.98\")   Weight: 58.968 kg (130 lb)   Weight % change since last entry.: 0 %   BP: 100/62   Pulse: 78   BMI (Calculated): 18.14   Resp: 16   Temp: 37 °C (98.6 °F)   O2 sat % room air: 93 %                 Physical Exam:   Appearance: well developed, well nourished, no acute distress.  Eyes: PERRL, EOM intact, sclere white, conjunctiva moist.  Ears: no lesions or deformities.  Hearing: grossly intact.  Nose: no lesions or deformities.  Dentition: good dentition.  Oropharynx: tongue normal, posterior pharynx without erythema or exudate.  Neck: supple, trachea midline, no masses.  Respiratory effort: no intercostal retractions or use of accessory muscles.  Lung auscultation: Bilateral diminished   Heart auscultation: no murmur, rub, or gallop.   Extremities: no cyanosis or edema.  Abdomen: soft, non-tender, no masses.  Gait and station: grossly normal   Digits and Nails: no clubbing, cyanosis, petechiae, or nodes.  Cranial nerves: grossly normal.  Motor: " no focal deficits observed.  Skin: no rashes, lesions, or ulcers noted.  Orientation: oriented to time, place, and person.  Mood and affect: mood and affect appropriate, normal interaction with examiner.    Assessment   1. Nocturnal oxygen desaturation  DME O2 NEW SET UP    AMB PULMONARY FUNCTION TEST/LAB    REFERRAL TO LUNG CANCER SCREENING PROGRAM    PNEUMOCOCCAL CONJUGATE VACCINE 13-VALENT   2. Chronic obstructive pulmonary disease, unspecified COPD type (CMS-HCC)  AMB PULMONARY FUNCTION TEST/LAB    MethylPREDNISolone (MEDROL DOSEPAK) 4 MG Tablet Therapy Pack    REFERRAL TO LUNG CANCER SCREENING PROGRAM    PNEUMOCOCCAL CONJUGATE VACCINE 13-VALENT   3. Cough         Patient was seen for 35 minutes, more than 50% of time spent in face to face review, counseling, and arranging future evaluation and follow up of medical conditions and care.     PLAN:   Patient Instructions   1) Continue Symbicort 160/4.5 2 puffs, twice a day. Continue rescue and nebulizer as needed    2) Vaccines: Up to date with Pneumovax 23. Prevnar 13 today  3) Start using oxygen at 2L nocturnally  4) Medrol pack for exacerbation  5) Return in about 2 months (around 9/20/2017) for review of symptoms, if not sooner, follow up with LU Voss, pulmonary function results.   6) Lung cancer screening referral placed

## 2017-07-21 ENCOUNTER — TELEPHONE (OUTPATIENT)
Dept: PULMONOLOGY | Facility: HOSPICE | Age: 67
End: 2017-07-21

## 2017-07-21 ENCOUNTER — TELEPHONE (OUTPATIENT)
Dept: HEMATOLOGY ONCOLOGY | Facility: MEDICAL CENTER | Age: 67
End: 2017-07-21

## 2017-07-21 NOTE — TELEPHONE ENCOUNTER
The pt called and he was confused on how to take the medrol dose jay.  I told him to take it per package directions as per Iman Culver's note.

## 2017-07-21 NOTE — TELEPHONE ENCOUNTER
Received referral to lung cancer screening program.  Chart review to assess for lung cancer screening program eligibility.   1. Age 55-77 yrs of age? Yes 67 y.o.  2. 30 pack year hx of smoking, or greater? Yes 1 ppdx 45 yrs=  45 pkyr hx  3. Current smoker or if quit, has pt quit within last 15 yrs?Yes  Quit 7/20/2014  4. Any signs or symptoms of lung cancer? None noted- does have COPD and severe emphysema  5. Previous history of lung cancer? None noted  6. Chest CT within past 12 mos.? None noted  Patient does meet eligibility criteria. [facility] scheduling notified to schedule the shared decision making visit.

## 2017-07-26 ENCOUNTER — OFFICE VISIT (OUTPATIENT)
Dept: HEMATOLOGY ONCOLOGY | Facility: MEDICAL CENTER | Age: 67
End: 2017-07-26
Payer: MEDICARE

## 2017-07-26 VITALS
TEMPERATURE: 98.4 F | HEART RATE: 76 BPM | HEIGHT: 71 IN | SYSTOLIC BLOOD PRESSURE: 100 MMHG | WEIGHT: 125.66 LBS | BODY MASS INDEX: 17.59 KG/M2 | RESPIRATION RATE: 16 BRPM | OXYGEN SATURATION: 92 % | DIASTOLIC BLOOD PRESSURE: 72 MMHG

## 2017-07-26 DIAGNOSIS — Z87.891 PERSONAL HISTORY OF NICOTINE DEPENDENCE: ICD-10-CM

## 2017-07-26 PROCEDURE — G0296 VISIT TO DETERM LDCT ELIG: HCPCS | Performed by: NURSE PRACTITIONER

## 2017-07-26 ASSESSMENT — ENCOUNTER SYMPTOMS
COUGH: 1
WEIGHT LOSS: 1
WHEEZING: 0
SHORTNESS OF BREATH: 1
SPUTUM PRODUCTION: 1
HEMOPTYSIS: 0

## 2017-07-26 ASSESSMENT — PAIN SCALES - GENERAL: PAINLEVEL: NO PAIN

## 2017-07-26 NOTE — MR AVS SNAPSHOT
"        Jigar QIU Marita   2017 12:00 PM   Office Visit   MRN: [id number]    Department:  [facility]   Dept Phone:  [phone]    Description:  Male : 1950   Provider:  Petra Hernandez A.P.N.           Reason for Visit     Lung Cancer Screening Program Prescreen           Allergies as of 2017     Allergen Noted Reactions    Bactrim Ds 2017   Shortness of Breath    Spiriva 2017         You were diagnosed with     Personal history of nicotine dependence   [710156]         Vital Signs     Blood Pressure Pulse Temperature Respirations Height Weight    100/72 mmHg 76 36.9 °C (98.4 °F) 16 1.803 m (5' 10.98\") 57 kg (125 lb 10.6 oz)    Body Mass Index Oxygen Saturation Smoking Status             17.53 kg/m2 92% Former Smoker         Basic Information     Date Of Birth Sex Race Ethnicity Preferred Language    1950 Male White Non- English      Your appointments     Sep 15, 2017  2:00 PM   Pulmonary Function Test with PFT-RM1   [facility] (--)    [address]  [address]  [place] [address] [phone]            Sep 22, 2017  2:20 PM   Established Patient Pul with REGGIE Robin   [facility] (--)    [address]  [address]  [place] [address] [phone]              Problem List              ICD-10-CM Priority Class Noted - Resolved    Unintentional weight loss R63.4   2014 - Present    Cluster headaches G44.009   2014 - Present    Neck mass R22.1   2014 - Present    COPD (chronic obstructive pulmonary disease) (CMS-HCC) J44.9   2014 - Present    Aortic aneurysm (CMS-HCC) I71.9   2014 - Present    Cough R05   2014 - Present    Need for Zostavax administration Z23   10/9/2015 - Present    Herpes labialis B00.1   10/9/2015 - Present    Nocturnal oxygen desaturation G47.34   2017 - Present      Health Maintenance        Date Due Completion Dates    IMM DTaP/Tdap/Td " Vaccine (1 - Tdap) 5/8/1968 ---    IMM INFLUENZA (1) 9/1/2017 10/9/2015, 11/4/2014    COLONOSCOPY 12/2/2024 12/2/2014            Current Immunizations     13-VALENT PCV PREVNAR 7/20/2017    Influenza Vaccine Adult HD 10/9/2015, 11/4/2014  1:35 PM    Pneumococcal polysaccharide vaccine (PPSV-23) 9/22/2014    SHINGLES VACCINE 12/8/2015      Below and/or attached are the medications your provider expects you to take. Review all of your home medications and newly ordered medications with your provider and/or pharmacist. Follow medication instructions as directed by your provider and/or pharmacist. Please keep your medication list with you and share with your provider. Update the information when medications are discontinued, doses are changed, or new medications (including over-the-counter products) are added; and carry medication information at all times in the event of emergency situations     Allergies:  BACTRIM DS - Shortness of Breath     SPIRIVA - (reactions not documented)               Medications  Valid as of: July 26, 2017 - 12:40 PM    Generic Name Brand Name Tablet Size Instructions for use    Acyclovir (Ointment) ZOVIRAX 5 % Apply 1 Application to affected area(s) every 3 hours.        Albuterol Sulfate (Aero Soln) albuterol 108 (90 BASE) MCG/ACT Inhale 2 Puffs by mouth every 6 hours as needed.        Aspirin (Tab) aspirin 81 MG Take 81 mg by mouth every day.        Budesonide-Formoterol Fumarate (Aerosol) SYMBICORT 160-4.5 MCG/ACT Inhale 2 Puffs by mouth 2 Times a Day.        Ipratropium-Albuterol (Solution) DUONEB 0.5-2.5 (3) MG/3ML USE ONE VIAL VIA NEBULIZER EVERY 4 HOURS AS NEEDED FOR SHORTNESS OF BREATH.        MethylPREDNISolone (Tablet Therapy Pack) MEDROL DOSEPAK 4 MG Instructions per package        Nutritional Supplements (Liquid) ENSURE NUTRITION SHAKE  Take 1 Bottle by mouth every day.        OxyCODONE HCl (Tab) ROXICODONE 5 MG Take 10 mg by mouth every four hours as needed for Severe Pain.           OxyCODONE HCl (Tab) ROXICODONE 10 MG         Polyethylene Glycol 3350 (Powder) MIRALAX  MIX 17 GRAMS IN 8 OUNCES OF LIQUID THEN TAKE BY MOUTH EVERY DAY        Sulfamethoxazole-Trimethoprim (Tab) BACTRIM -160 MG         .                 Medicines prescribed today were sent to:     [id number] - [place], [place] - [address]    [address] [place] [address]    Phone: [phone] Fax: [phone]    Open 24 Hours?: No      Medication refill instructions:       If your prescription bottle indicates you have medication refills left, it is not necessary to call your provider’s office. Please contact your pharmacy and they will refill your medication.    If your prescription bottle indicates you do not have any refills left, you may request refills at any time through one of the following ways: The online [employer] system (except Urgent Care), by calling your provider’s office, or by asking your pharmacy to contact your provider’s office with a refill request. Medication refills are processed only during regular business hours and may not be available until the next business day. Your provider may request additional information or to have a follow-up visit with you prior to refilling your medication.   *Please Note: Medication refills are assigned a new Rx number when refilled electronically. Your pharmacy may indicate that no refills were authorized even though a new prescription for the same medication is available at the pharmacy. Please request the medicine by name with the pharmacy before contacting your provider for a refill.        Your To Do List     Future Labs/Procedures Complete By Expires    CT-LUNG CANCER-SCREENING  As directed 7/26/2018         [employer] Status: Patient Declined

## 2017-07-26 NOTE — PROGRESS NOTES
Subjective:      Jigar Kirkland is a 67 y.o. male who presents with Lung Cancer Screening Program Prescreen for lung cancer screening shared decision making visit.           HPI    Patient seen today for initial lung cancer screening visit. Patient referred by his pulmonary provider LU Voss. PCP is Dr. Funes.     The patient meets eligibility criteria including age, smoking history (30+ pack years), if former smoker, quit in the last 15 years, and absence of signs or symptoms of lung cancer.    - Age - 67  - Smoking history - Patient has smoked for 50 years at an average of 1 ppd = 50 pack year smoking history.  - Current smoking status - Former smoker. Quit in July 2014, 3 years ago.   - No symptoms of lung cancer and no previous history of lung cancer     Allergies   Allergen Reactions   • Bactrim Ds Shortness of Breath   • Spiriva      Current Outpatient Prescriptions on File Prior to Visit   Medication Sig Dispense Refill   • oxycodone immediate release (ROXICODONE) 10 MG immediate release tablet      • sulfamethoxazole-trimethoprim (BACTRIM DS) 800-160 MG tablet      • MethylPREDNISolone (MEDROL DOSEPAK) 4 MG Tablet Therapy Pack Instructions per package 21 Tab 0   • albuterol (PROAIR HFA) 108 (90 BASE) MCG/ACT Aero Soln inhalation aerosol Inhale 2 Puffs by mouth every 6 hours as needed. 9 g 2   • budesonide-formoterol (SYMBICORT) 160-4.5 MCG/ACT Aerosol Inhale 2 Puffs by mouth 2 Times a Day. 1 Inhaler 11   • polyethylene glycol 3350 (MIRALAX) Powder MIX 17 GRAMS IN 8 OUNCES OF LIQUID THEN TAKE BY MOUTH EVERY  g 4   • ipratropium-albuterol (DUONEB) 0.5-2.5 (3) MG/3ML nebulizer solution USE ONE VIAL VIA NEBULIZER EVERY 4 HOURS AS NEEDED FOR SHORTNESS OF BREATH. 90 mL 2   • acyclovir (ZOVIRAX) 5 % Ointment Apply 1 Application to affected area(s) every 3 hours. 1 Tube 3   • oxycodone immediate-release (ROXICODONE) 5 MG Tab Take 10 mg by mouth every four hours as needed for Severe Pain.    "  • aspirin 81 MG tablet Take 81 mg by mouth every day.     • Nutritional Supplements (ENSURE NUTRITION SHAKE) LIQD Take 1 Bottle by mouth every day. 24 Bottle 12     No current facility-administered medications on file prior to visit.       Review of Systems   Constitutional: Positive for weight loss (noticed when he retired he has lost some weight). Negative for malaise/fatigue (his activity level is low at this time but not much fatigue noted).   Respiratory: Positive for cough (sometimes and not as severe as before), sputum production (very mild sputum production) and shortness of breath (with activity). Negative for hemoptysis and wheezing.           Objective:     /72 mmHg  Pulse 76  Temp(Src) 36.9 °C (98.4 °F)  Resp 16  Ht 1.803 m (5' 10.98\")  Wt 57 kg (125 lb 10.6 oz)  BMI 17.53 kg/m2  SpO2 92%     Physical Exam   Constitutional: He is oriented to person, place, and time. He appears well-developed and well-nourished. No distress.   HENT:   Head: Normocephalic and atraumatic.   Cardiovascular: Normal rate, regular rhythm and normal heart sounds.  Exam reveals no gallop and no friction rub.    No murmur heard.  Pulmonary/Chest: Effort normal. No respiratory distress. He has no wheezes.   Diminished throughout but more so in the bases   Musculoskeletal: Normal range of motion.   Neurological: He is alert and oriented to person, place, and time.   Skin: Skin is warm and dry. He is not diaphoretic.   Vitals reviewed.              Assessment/Plan:     1. Personal history of nicotine dependence  CT-LUNG CANCER-SCREENING         We conducted a shared decision-making process using a decision aid. We reviewed benefits and harms of screening, including false positives and potential need for additional diagnostic testing, the possibility of over diagnosis, and total radiation exposure.    We discussed the importance of adhering to annual LDCT screening. We also discussed the impact of comorbities on the " patient's the ability or willingness to undergo diagnostic procedure(s) and treatment.    Counseling on the importance of maintaining cigarette smoking abstinence if former smoker; or the importance of smoking cessation if current smoker and, if appropriate, furnishing of information about tobacco cessation interventions.    Based on our discussion, we have decided to begin annual lung cancer screening starting now.

## 2017-08-01 ENCOUNTER — TELEPHONE (OUTPATIENT)
Dept: PULMONOLOGY | Facility: HOSPICE | Age: 67
End: 2017-08-01

## 2017-08-01 DIAGNOSIS — J44.9 CHRONIC OBSTRUCTIVE PULMONARY DISEASE, UNSPECIFIED COPD TYPE (HCC): ICD-10-CM

## 2017-08-01 NOTE — TELEPHONE ENCOUNTER
Although the HSS ruled SAMUEL it can not be used as qualification for o2.  Please sign for CNOX on room air and key will get this done before the notes

## 2017-08-03 ENCOUNTER — OFFICE VISIT (OUTPATIENT)
Dept: URGENT CARE | Facility: CLINIC | Age: 67
End: 2017-08-03
Payer: MEDICARE

## 2017-08-03 VITALS
TEMPERATURE: 99.7 F | HEART RATE: 83 BPM | WEIGHT: 128 LBS | SYSTOLIC BLOOD PRESSURE: 140 MMHG | HEIGHT: 66 IN | DIASTOLIC BLOOD PRESSURE: 78 MMHG | BODY MASS INDEX: 20.57 KG/M2 | OXYGEN SATURATION: 96 %

## 2017-08-03 DIAGNOSIS — R39.11 URINARY HESITANCY: ICD-10-CM

## 2017-08-03 DIAGNOSIS — R11.2 NAUSEA AND VOMITING, INTRACTABILITY OF VOMITING NOT SPECIFIED, UNSPECIFIED VOMITING TYPE: ICD-10-CM

## 2017-08-03 LAB
APPEARANCE UR: CLEAR
BILIRUB UR STRIP-MCNC: NORMAL MG/DL
COLOR UR AUTO: YELLOW
GLUCOSE UR STRIP.AUTO-MCNC: NORMAL MG/DL
KETONES UR STRIP.AUTO-MCNC: 160 MG/DL
LEUKOCYTE ESTERASE UR QL STRIP.AUTO: NORMAL
NITRITE UR QL STRIP.AUTO: NORMAL
PH UR STRIP.AUTO: 6.5 [PH] (ref 5–8)
PROT UR QL STRIP: 100 MG/DL
RBC UR QL AUTO: NORMAL
SP GR UR STRIP.AUTO: 1
UROBILINOGEN UR STRIP-MCNC: NORMAL MG/DL

## 2017-08-03 PROCEDURE — 81002 URINALYSIS NONAUTO W/O SCOPE: CPT | Performed by: FAMILY MEDICINE

## 2017-08-03 PROCEDURE — 99214 OFFICE O/P EST MOD 30 MIN: CPT | Performed by: FAMILY MEDICINE

## 2017-08-03 RX ORDER — ONDANSETRON 4 MG/1
TABLET, ORALLY DISINTEGRATING ORAL
Qty: 15 TAB | Refills: 0 | Status: SHIPPED | OUTPATIENT
Start: 2017-08-03 | End: 2018-03-19

## 2017-08-03 RX ORDER — ONDANSETRON 2 MG/ML
4 INJECTION INTRAMUSCULAR; INTRAVENOUS ONCE
Status: COMPLETED | OUTPATIENT
Start: 2017-08-03 | End: 2017-08-03

## 2017-08-03 RX ORDER — CEFTRIAXONE 1 G/1
1 INJECTION, POWDER, FOR SOLUTION INTRAMUSCULAR; INTRAVENOUS ONCE
Status: COMPLETED | OUTPATIENT
Start: 2017-08-03 | End: 2017-08-03

## 2017-08-03 RX ORDER — CIPROFLOXACIN 500 MG/1
TABLET, FILM COATED ORAL
Qty: 14 TAB | Refills: 0 | Status: SHIPPED | OUTPATIENT
Start: 2017-08-03 | End: 2017-09-22

## 2017-08-03 RX ADMIN — ONDANSETRON 4 MG: 2 INJECTION INTRAMUSCULAR; INTRAVENOUS at 09:38

## 2017-08-03 RX ADMIN — CEFTRIAXONE 1 G: 1 INJECTION, POWDER, FOR SOLUTION INTRAMUSCULAR; INTRAVENOUS at 10:04

## 2017-08-03 NOTE — PROGRESS NOTES
Chief Complaint:    Chief Complaint   Patient presents with   • Emesis     X last night, nausea, lethargic, hard to urinate, urgency        History of Present Illness:    This is a new problem. Yesterday he started with nausea and vomiting. He reports he was still nauseated when I first saw him. No diarrhea. He reports for a few weeks he feels like has trouble urinating well and completely like could have some type of obstruction. No dysuria. He has overall felt lethargic and feverish and has temp of 99.7 F here, but has not checked his temp at home.       Review of Systems:    Constitutional: See HPI.  Eyes: Negative for change in vision, photophobia, pain, redness, and discharge.  ENT: Negative for ear pain, ear discharge, hearing loss, tinnitus, nasal congestion, nosebleeds, and sore throat.    Respiratory: Negative for cough, hemoptysis, sputum production, shortness of breath, wheezing, and stridor.    Cardiovascular: Negative for chest pain, palpitations, orthopnea, claudication, leg swelling, and PND.   Gastrointestinal: See HPI.  Genitourinary: See HPI.  Musculoskeletal: No new symptoms.  Skin: Negative for rash and itching.   Neurological: Negative for dizziness, tingling, tremors, sensory change, speech change, focal weakness, seizures, loss of consciousness, and headaches.   Endo: Negative for polydipsia.   Heme: Does not bruise/bleed easily.   Psychiatric/Behavioral: Negative for depression, suicidal ideas, hallucinations, memory loss and substance abuse. The patient is not nervous/anxious and does not have insomnia.      Past Medical History:    Past Medical History   Diagnosis Date   • Chronic airway obstruction, not elsewhere classified    • Bronchitis    • COPD (chronic obstructive pulmonary disease) (CMS-HCC)    • Aortic aneurysm (CMS-HCC)        Past Surgical History:    Past Surgical History   Procedure Laterality Date   • Knee arthroscopy     • Knee orif         Social History:    Social History  "    Social History   • Marital Status:      Spouse Name: N/A   • Number of Children: N/A   • Years of Education: N/A     Occupational History   • Not on file.     Social History Main Topics   • Smoking status: Former Smoker -- 1.00 packs/day for 50 years     Types: Cigarettes     Quit date: 07/20/2014   • Smokeless tobacco: Never Used   • Alcohol Use: No   • Drug Use: No   • Sexual Activity:     Partners: Female      Comment: Off and on     Other Topics Concern   • Not on file     Social History Narrative       Family History:    Family History   Problem Relation Age of Onset   • Cancer Father      Type unspec.   • Cancer Mother      Type unspec.       Medications:    Current Outpatient Prescriptions on File Prior to Visit   Medication Sig Dispense Refill   • oxycodone immediate release (ROXICODONE) 10 MG immediate release tablet      • budesonide-formoterol (SYMBICORT) 160-4.5 MCG/ACT Aerosol Inhale 2 Puffs by mouth 2 Times a Day. 1 Inhaler 11   • polyethylene glycol 3350 (MIRALAX) Powder MIX 17 GRAMS IN 8 OUNCES OF LIQUID THEN TAKE BY MOUTH EVERY  g 4   • ipratropium-albuterol (DUONEB) 0.5-2.5 (3) MG/3ML nebulizer solution USE ONE VIAL VIA NEBULIZER EVERY 4 HOURS AS NEEDED FOR SHORTNESS OF BREATH. 90 mL 2   • albuterol (PROAIR HFA) 108 (90 BASE) MCG/ACT Aero Soln inhalation aerosol Inhale 2 Puffs by mouth every 6 hours as needed. 9 g 2   • acyclovir (ZOVIRAX) 5 % Ointment Apply 1 Application to affected area(s) every 3 hours. 1 Tube 3   • aspirin 81 MG tablet Take 81 mg by mouth every day.     • Nutritional Supplements (ENSURE NUTRITION SHAKE) LIQD Take 1 Bottle by mouth every day. 24 Bottle 12     No current facility-administered medications on file prior to visit.       Allergies:    Allergies   Allergen Reactions   • Bactrim Ds Shortness of Breath   • Spiriva          Vitals:    Filed Vitals:    08/03/17 0914   BP: 140/78   Pulse: 83   Temp: 37.6 °C (99.7 °F)   Height: 1.664 m (5' 5.51\")   Weight: " 58.06 kg (128 lb)   SpO2: 96%       Physical Exam:    Constitutional: Vital signs reviewed. Appears well-developed and thin. Nauseated when I first saw him.  Eyes: Sclera white, conjunctivae clear.   ENT: External ears normal. Hearing normal. Nasal mucosa pink. Lips/teeth are normal. Oral mucosa pink and moist. Posterior pharynx: WNL.  Neck: Neck supple.   Cardiovascular: Regular rate and rhythm. No murmur.   Pulmonary/Chest: Respirations non-labored. Clear to auscultation bilaterally.  Abdomen: Bowel sounds are normal active. Soft, non-distended, and non-tender to palpation.   Musculoskeletal: Normal gait. Normal range of motion. No muscular atrophy or weakness.  Neurological: Alert and oriented to person, place, and time. Muscle tone normal. Coordination normal. Light touch and sensation normal.   Skin: No rashes or lesions. Warm, dry, normal turgor.  Psychiatric: Normal mood and affect. Behavior is normal. Judgment and thought content normal.     Diagnostics:     POCT URINALYSIS (Order #[id number]) on 8/3/17       Component Results      Component Value Ref Range & Units Status     POC Color Yellow Negative Final     POC Appearance CLear Negative Final     POC Leukocyte Esterase neg Negative Final     POC Nitrites neg Negative Final     POC Urobiligen neg Negative (0.2) mg/dL Final     POC Protein 100 Negative mg/dL Final     POC Urine PH 6.5 5.0 - 8.0 Final     POC Blood neg Negative Final     POC Specific Gravity 1.005 <1.005 - >1.030 Final     POC Ketones 160 Negative mg/dL Final     POC Biliruben neg Negative mg/dL Final     POC Glucose neg Negative mg/dL Final         Last Resulted Time     u Aug 3, 2017 10:10 AM       Assessment / Plan:    1. Nausea and vomiting, intractability of vomiting not specified, unspecified vomiting type  - ondansetron (ZOFRAN) syringe/vial injection 4 mg; 2 mL by Intramuscular route Once.  - ondansetron (ZOFRAN ODT) 4 MG TABLET DISPERSIBLE; 1 TAB, ALLOW TO DISINTEGRATE IN MOUTH,  EVERY 8 HOURS ONLY IF NEEDED FOR NAUSEA OR VOMITING.  Dispense: 15 Tab; Refill: 0    2. Urinary hesitancy  - POCT Urinalysis  - cefTRIAXone (ROCEPHIN) injection 1 g; 1,000 mg by Intramuscular route Once.  - ciprofloxacin (CIPRO) 500 MG Tab; 1 TAB TWICE A DAY X 7 DAYS.  Dispense: 14 Tab; Refill: 0      Discussed with him DDX and management options.    Discussed treating for possible prostate infection due to urine symptoms and fever and lethargy. He would like and desires aggressive with IM and p.o. antibiotic.    Agreeable to medications given and prescribed. He reported feeling no more nausea after Zofran IM before he left clinic.    Follow-up with PCP or urgent care if getting worse or not better with above.

## 2017-08-03 NOTE — MR AVS SNAPSHOT
"        Jigar QIU Marita   8/3/2017 9:00 AM   Office Visit   MRN: [id number]    Department:  [facility]   Dept Phone:  [phone]    Description:  Male : 1950   Provider:  Ramon Allen M.D.           Reason for Visit     Emesis X last night, nausea, lathargic, headache, sinus congestion, hard to urinate, urgency       Allergies as of 8/3/2017     Allergen Noted Reactions    Bactrim Ds 2017   Shortness of Breath    Spiriva 2017         You were diagnosed with     Nausea and vomiting, intractability of vomiting not specified, unspecified vomiting type   [1334642]       Urinary hesitancy   [788.64.ICD-9-CM]         Vital Signs     Blood Pressure Pulse Temperature Height Weight Body Mass Index    140/78 mmHg 83 37.6 °C (99.7 °F) 1.664 m (5' 5.51\") 58.06 kg (128 lb) 20.97 kg/m2    Oxygen Saturation Smoking Status                96% Former Smoker          Basic Information     Date Of Birth Sex Race Ethnicity Preferred Language    1950 Male White Non- English      Your appointments     Sep 15, 2017  2:00 PM   Pulmonary Function Test with PFT-RM1   [facility] (--)    [address]  [address]  [place] [id number] [phone]            Sep 22, 2017  2:20 PM   Established Patient Pul with REGGIE Robin   [facility] (--)    [address]  [address]  [place] [id number] [phone]              Problem List              ICD-10-CM Priority Class Noted - Resolved    Unintentional weight loss R63.4   2014 - Present    Cluster headaches G44.009   2014 - Present    Neck mass R22.1   2014 - Present    COPD (chronic obstructive pulmonary disease) (CMS-HCC) J44.9   2014 - Present    Aortic aneurysm (CMS-HCC) I71.9   2014 - Present    Cough R05   2014 - Present    Need for Zostavax administration Z23   10/9/2015 - Present    Herpes labialis B00.1   10/9/2015 - Present    Nocturnal oxygen " desaturation G47.34   7/20/2017 - Present      Health Maintenance        Date Due Completion Dates    IMM DTaP/Tdap/Td Vaccine (1 - Tdap) 5/8/1968 ---    IMM INFLUENZA (1) 9/1/2017 10/9/2015, 11/4/2014    COLONOSCOPY 12/2/2024 12/2/2014            Results     POCT Urinalysis      Component Value Standard Range & Units    POC Color Yellow Negative    POC Appearance CLear Negative    POC Leukocyte Esterase neg Negative    POC Nitrites neg Negative    POC Urobiligen neg Negative (0.2) mg/dL    POC Protein 100 Negative mg/dL    POC Urine PH 6.5 5.0 - 8.0    POC Blood neg Negative    POC Specific Gravity 1.005 <1.005 - >1.030    POC Ketones 160 Negative mg/dL    POC Biliruben neg Negative mg/dL    POC Glucose neg Negative mg/dL                        Current Immunizations     13-VALENT PCV PREVNAR 7/20/2017    Influenza Vaccine Adult HD 10/9/2015, 11/4/2014  1:35 PM    Pneumococcal polysaccharide vaccine (PPSV-23) 9/22/2014    SHINGLES VACCINE 12/8/2015      Below and/or attached are the medications your provider expects you to take. Review all of your home medications and newly ordered medications with your provider and/or pharmacist. Follow medication instructions as directed by your provider and/or pharmacist. Please keep your medication list with you and share with your provider. Update the information when medications are discontinued, doses are changed, or new medications (including over-the-counter products) are added; and carry medication information at all times in the event of emergency situations     Allergies:  BACTRIM DS - Shortness of Breath     SPIRIVA - (reactions not documented)               Medications  Valid as of: August 03, 2017 - 10:21 AM    Generic Name Brand Name Tablet Size Instructions for use    Acyclovir (Ointment) ZOVIRAX 5 % Apply 1 Application to affected area(s) every 3 hours.        Albuterol Sulfate (Aero Soln) albuterol 108 (90 BASE) MCG/ACT Inhale 2 Puffs by mouth every 6 hours as  needed.        Aspirin (Tab) aspirin 81 MG Take 81 mg by mouth every day.        Budesonide-Formoterol Fumarate (Aerosol) SYMBICORT 160-4.5 MCG/ACT Inhale 2 Puffs by mouth 2 Times a Day.        Ciprofloxacin HCl (Tab) CIPRO 500 MG 1 TAB TWICE A DAY X 7 DAYS.        Ipratropium-Albuterol (Solution) DUONEB 0.5-2.5 (3) MG/3ML USE ONE VIAL VIA NEBULIZER EVERY 4 HOURS AS NEEDED FOR SHORTNESS OF BREATH.        Nutritional Supplements (Liquid) ENSURE NUTRITION SHAKE  Take 1 Bottle by mouth every day.        Ondansetron (TABLET DISPERSIBLE) ZOFRAN ODT 4 MG 1 TAB, ALLOW TO DISINTEGRATE IN MOUTH, EVERY 8 HOURS ONLY IF NEEDED FOR NAUSEA OR VOMITING.        OxyCODONE HCl (Tab) ROXICODONE 10 MG         Polyethylene Glycol 3350 (Powder) MIRALAX  MIX 17 GRAMS IN 8 OUNCES OF LIQUID THEN TAKE BY MOUTH EVERY DAY        .                 Medicines prescribed today were sent to:     [id number] - [place], [place] - [address] [address] [address]    Phone: [phone] Fax: [phone]    Open 24 Hours?: No      Medication refill instructions:       If your prescription bottle indicates you have medication refills left, it is not necessary to call your provider’s office. Please contact your pharmacy and they will refill your medication.    If your prescription bottle indicates you do not have any refills left, you may request refills at any time through one of the following ways: The online [employer] system (except Urgent Care), by calling your provider’s office, or by asking your pharmacy to contact your provider’s office with a refill request. Medication refills are processed only during regular business hours and may not be available until the next business day. Your provider may request additional information or to have a follow-up visit with you prior to refilling your medication.   *Please Note: Medication refills are assigned a new Rx number when refilled electronically. Your pharmacy may indicate  that no refills were authorized even though a new prescription for the same medication is available at the pharmacy. Please request the medicine by name with the pharmacy before contacting your provider for a refill.           MyChart Status: Patient Declined

## 2017-08-08 ENCOUNTER — TELEPHONE (OUTPATIENT)
Dept: PULMONOLOGY | Facility: HOSPICE | Age: 67
End: 2017-08-08

## 2017-08-08 DIAGNOSIS — J44.9 CHRONIC OBSTRUCTIVE PULMONARY DISEASE, UNSPECIFIED COPD TYPE (HCC): ICD-10-CM

## 2017-08-24 ENCOUNTER — TELEPHONE (OUTPATIENT)
Dept: PULMONOLOGY | Facility: HOSPICE | Age: 67
End: 2017-08-24

## 2017-08-24 DIAGNOSIS — J43.9 PULMONARY EMPHYSEMA, UNSPECIFIED EMPHYSEMA TYPE (HCC): ICD-10-CM

## 2017-08-24 RX ORDER — METHYLPREDNISOLONE 4 MG/1
TABLET ORAL
Qty: 21 TAB | Refills: 0 | Status: SHIPPED | OUTPATIENT
Start: 2017-08-24 | End: 2017-09-22

## 2017-08-24 NOTE — TELEPHONE ENCOUNTER
Pt called requesting a Medrol dose jay, states that he is somewhat sob (just started on 2L O2 about  2 weeks ago), not wheezing, no fever or chills.    Last OV: 7/20/17  Next OV: 9/22/17  COPD  Medrol dose jay

## 2017-08-28 RX ORDER — METHYLPREDNISOLONE 4 MG/1
TABLET ORAL
Qty: 1 KIT | Refills: 0 | OUTPATIENT
Start: 2017-08-28

## 2017-08-28 NOTE — TELEPHONE ENCOUNTER
Caller Name: Jigar Kirkland                 Call Back Number: [phone] (home)         Patient approves a detailed voicemail message: N\A    Have we ever prescribed this med? Yes.  If yes, what date? 8/24/17    Last OV: 7/20/17    Next OV: 9/22/17    DX: COPD     Medications:  Current Outpatient Prescriptions   Medication Sig Dispense Refill   • MethylPREDNISolone (MEDROL DOSEPAK) 4 MG Tablet Therapy Pack Take as directed. 21 Tab 0   • ondansetron (ZOFRAN ODT) 4 MG TABLET DISPERSIBLE 1 TAB, ALLOW TO DISINTEGRATE IN MOUTH, EVERY 8 HOURS ONLY IF NEEDED FOR NAUSEA OR VOMITING. 15 Tab 0   • ciprofloxacin (CIPRO) 500 MG Tab 1 TAB TWICE A DAY X 7 DAYS. 14 Tab 0   • oxycodone immediate release (ROXICODONE) 10 MG immediate release tablet      • albuterol (PROAIR HFA) 108 (90 BASE) MCG/ACT Aero Soln inhalation aerosol Inhale 2 Puffs by mouth every 6 hours as needed. 9 g 2   • budesonide-formoterol (SYMBICORT) 160-4.5 MCG/ACT Aerosol Inhale 2 Puffs by mouth 2 Times a Day. 1 Inhaler 11   • polyethylene glycol 3350 (MIRALAX) Powder MIX 17 GRAMS IN 8 OUNCES OF LIQUID THEN TAKE BY MOUTH EVERY  g 4   • ipratropium-albuterol (DUONEB) 0.5-2.5 (3) MG/3ML nebulizer solution USE ONE VIAL VIA NEBULIZER EVERY 4 HOURS AS NEEDED FOR SHORTNESS OF BREATH. 90 mL 2   • acyclovir (ZOVIRAX) 5 % Ointment Apply 1 Application to affected area(s) every 3 hours. 1 Tube 3   • aspirin 81 MG tablet Take 81 mg by mouth every day.     • Nutritional Supplements (ENSURE NUTRITION SHAKE) LIQD Take 1 Bottle by mouth every day. 24 Bottle 12     No current facility-administered medications for this visit.

## 2017-08-29 NOTE — TELEPHONE ENCOUNTER
"I spoke to the patient informed RX was denied. Patient stated okay but has been having difficulty breathing states is using his symbicort and rescue on a daily basis. He did receive his oxygen is using it during the night time as directed, states has been having a dry nose, states is using \"ocean spray\" for his nose.   "

## 2017-08-29 NOTE — TELEPHONE ENCOUNTER
Message   Received: Today   Message Contents   KB Banks.  Hailey Arevalo, Med Ass't             If he's having issues, he should try to be seen sooner.

## 2017-08-29 NOTE — TELEPHONE ENCOUNTER
"I spoke to the patient informed per LU Orozco he should be seen sooner. Patient states he isn't feeling the same since 2-3 years ago. Has been having a difficult time, states is taking his symbicort and rescue and steroids. He states \" he will wing it\" and if breathing gets worse he will go to the ER.   "

## 2017-09-06 NOTE — TELEPHONE ENCOUNTER
Message   Received: 1 week ago   Message Contents   REGGIE Banks, Med Ass't   Caller: Unspecified (2 weeks ago)             Can discuss back up at upcoming office vist. No fill at this time.    Previous Messages             Patient is scheduled to see LU Voss 9/22/17

## 2017-09-15 ENCOUNTER — NON-PROVIDER VISIT (OUTPATIENT)
Dept: PULMONOLOGY | Facility: HOSPICE | Age: 67
End: 2017-09-15
Payer: MEDICARE

## 2017-09-15 DIAGNOSIS — G47.34 NOCTURNAL OXYGEN DESATURATION: ICD-10-CM

## 2017-09-15 DIAGNOSIS — J44.9 CHRONIC OBSTRUCTIVE PULMONARY DISEASE, UNSPECIFIED COPD TYPE (HCC): ICD-10-CM

## 2017-09-15 PROCEDURE — 94060 EVALUATION OF WHEEZING: CPT | Performed by: INTERNAL MEDICINE

## 2017-09-15 PROCEDURE — 94729 DIFFUSING CAPACITY: CPT | Performed by: INTERNAL MEDICINE

## 2017-09-15 PROCEDURE — 94726 PLETHYSMOGRAPHY LUNG VOLUMES: CPT | Performed by: INTERNAL MEDICINE

## 2017-09-15 ASSESSMENT — PULMONARY FUNCTION TESTS
FVC_PERCENT_PREDICTED: 79
FEV1/FVC_PERCENT_PREDICTED: 44
FVC: 3.71
FEV1_PERCENT_CHANGE: 2
FEV1/FVC_PERCENT_PREDICTED: 74
FVC_PERCENT_PREDICTED: 81
FEV1/FVC_PERCENT_CHANGE: 100
FEV1_PERCENT_PREDICTED: 35
FVC: 3.79
FEV1/FVC_PERCENT_PREDICTED: 44
FEV1: 1.26
FEV1_PERCENT_PREDICTED: 36
FEV1: 1.23
FEV1_PERCENT_CHANGE: 2
FEV1/FVC: 33
FEV1_PREDICTED: 3.44
FVC_PREDICTED: 4.66
FEV1/FVC: 33.25

## 2017-09-15 NOTE — PROCEDURES
Good patient effort & cooperation.  The results of this test meet the ATS standards for acceptability and repeatability.  A bronchodilator of Ventolin HFA- 2puffs via spacer was administered.    Lung function testing completed September 15, 2017 shows a severe obstructive defect. Mid flows were 11% predicted. FEV1 is 35% predicted. No change after bronchodilators. Lung volumes demonstrate severe hyperinflation. 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 Good effort noted and flow volume loop confirms the severe obstructive pattern

## 2017-09-22 ENCOUNTER — OFFICE VISIT (OUTPATIENT)
Dept: PULMONOLOGY | Facility: HOSPICE | Age: 67
End: 2017-09-22
Payer: MEDICARE

## 2017-09-22 VITALS
TEMPERATURE: 98.6 F | DIASTOLIC BLOOD PRESSURE: 82 MMHG | BODY MASS INDEX: 21.21 KG/M2 | WEIGHT: 132 LBS | HEIGHT: 66 IN | SYSTOLIC BLOOD PRESSURE: 110 MMHG | RESPIRATION RATE: 16 BRPM | HEART RATE: 80 BPM | OXYGEN SATURATION: 93 %

## 2017-09-22 DIAGNOSIS — J43.9 PULMONARY EMPHYSEMA, UNSPECIFIED EMPHYSEMA TYPE (HCC): ICD-10-CM

## 2017-09-22 DIAGNOSIS — G47.34 NOCTURNAL OXYGEN DESATURATION: ICD-10-CM

## 2017-09-22 PROCEDURE — 90662 IIV NO PRSV INCREASED AG IM: CPT | Performed by: NURSE PRACTITIONER

## 2017-09-22 PROCEDURE — 99214 OFFICE O/P EST MOD 30 MIN: CPT | Mod: 25 | Performed by: NURSE PRACTITIONER

## 2017-09-22 PROCEDURE — G0008 ADMIN INFLUENZA VIRUS VAC: HCPCS | Performed by: NURSE PRACTITIONER

## 2017-09-22 RX ORDER — METHYLPREDNISOLONE 4 MG/1
TABLET ORAL
Qty: 21 TAB | Refills: 0 | Status: SHIPPED | OUTPATIENT
Start: 2017-09-22 | End: 2018-08-15 | Stop reason: SDUPTHER

## 2017-09-22 RX ORDER — AZITHROMYCIN 250 MG/1
TABLET, FILM COATED ORAL
Qty: 6 TAB | Refills: 0 | Status: SHIPPED | OUTPATIENT
Start: 2017-09-22 | End: 2018-08-15 | Stop reason: SDUPTHER

## 2017-09-22 RX ORDER — AZELASTINE 1 MG/ML
1-2 SPRAY, METERED NASAL 2 TIMES DAILY
Qty: 1 BOTTLE | Refills: 11 | Status: SHIPPED | OUTPATIENT
Start: 2017-09-22 | End: 2018-03-19

## 2017-09-22 NOTE — PROGRESS NOTES
CC:  Here for f/u sleep and pulmonary issues as listed below    HPI:   Jigar presents today for follow up for emphysema.  PFTs from 9/2017 have declined since 2015 indicate a Fev1 of 1.23L or 35% predicted with a mild bronchodilator response, Fev1/FVC ratio of 33, DLCO 48%.  He is a smoker of 45 pack years who quit almost 2 years ago. Had CAT scan performed September 1, 2015 with no evidence of thrombosis however demonstrates severe emphysema.  Patient has a history of a aneurysm and encouraged to follow-up with cardiology. Referred to lung cancer screening, but declines cat scan. Understands the benefits of early detection of lung CA, but would rather not know he admits.     Patient is compliant using Symbicort 160-4.52 puffs twice a day with mouth rinse, has been using his rescue inhaler appx 1x/week. He has stable dyspnea. He continues to have a chronic cough that is dry. He trialed Prilosec for 2 weeks without any change. He does have nasal drainage with clear mucus that has increased with oxygen. He denies wheeze, chest pain or chest tightness, fever and chills. He works out weekly lifting weights. He reports since he started oxygen the tip of his nose is red and quite tender. He has tried multiple OTC remedies including but not limited to: alcohol, peroxide, vaseline with no improvement.     HSS from 7/2017 indicated an AHI of 3.2 and low oxygen of 82% with time below 88% of 5 hours and 49 minutes.  He required an overnight oximetry which showed time below oxygenation of 88% of 47 minutes. He has been using 2 L of oxygen at night. Patient is currently sleeping 2-3 hours then he wakes, because of tinnitus to go bathroom and with 2-3x nighttime awakenings. They sometimes have trouble falling asleep.  They do not feel refreshed in the morning and denies morning H/A. They feel tired throughout the day and tries naps for appx 1 hour long.  Patient reports snoring, apnea events and paroxysmal nocturnal dyspnea  events.       Patient Active Problem List    Diagnosis Date Noted   • Nocturnal oxygen desaturation 07/20/2017   • Need for Zostavax administration 10/09/2015   • Herpes labialis 10/09/2015   • Aortic aneurysm (CMS-HCC) 11/04/2014   • Unintentional weight loss 09/22/2014   • Cluster headaches 09/22/2014   • Neck mass 09/22/2014   • COPD (chronic obstructive pulmonary disease) (CMS-HCC) 09/22/2014       Past Medical History:   Diagnosis Date   • Aortic aneurysm (CMS-HCC)    • Bronchitis    • Chronic airway obstruction, not elsewhere classified    • COPD (chronic obstructive pulmonary disease) (CMS-HCC)        Past Surgical History:   Procedure Laterality Date   • KNEE ARTHROSCOPY     • KNEE ORIF         Family History   Problem Relation Age of Onset   • Cancer Mother      Type unspec.   • Cancer Father      Type unspec.       Social History     Social History   • Marital status:      Spouse name: N/A   • Number of children: N/A   • Years of education: N/A     Occupational History   • Not on file.     Social History Main Topics   • Smoking status: Former Smoker     Packs/day: 1.00     Years: 50.00     Types: Cigarettes     Quit date: 7/20/2014   • Smokeless tobacco: Never Used   • Alcohol use No   • Drug use: No   • Sexual activity: Yes     Partners: Female      Comment: Off and on     Other Topics Concern   • Not on file     Social History Narrative   • No narrative on file       Current Outpatient Prescriptions   Medication Sig Dispense Refill   • azelastine (ASTELIN) 137 MCG/SPRAY nasal spray Spray 1-2 Sprays in nose 2 times a day. 1 Bottle 11   • azithromycin (ZITHROMAX) 250 MG Tab Take 2 tablets on day 1, then take 1 tablet a day for 4 days. 6 Tab 0   • MethylPREDNISolone (MEDROL DOSEPAK) 4 MG Tablet Therapy Pack Take as directed. 21 Tab 0   • ondansetron (ZOFRAN ODT) 4 MG TABLET DISPERSIBLE 1 TAB, ALLOW TO DISINTEGRATE IN MOUTH, EVERY 8 HOURS ONLY IF NEEDED FOR NAUSEA OR VOMITING. 15 Tab 0   • oxycodone  "immediate release (ROXICODONE) 10 MG immediate release tablet      • albuterol (PROAIR HFA) 108 (90 BASE) MCG/ACT Aero Soln inhalation aerosol Inhale 2 Puffs by mouth every 6 hours as needed. 9 g 2   • budesonide-formoterol (SYMBICORT) 160-4.5 MCG/ACT Aerosol Inhale 2 Puffs by mouth 2 Times a Day. 1 Inhaler 11   • polyethylene glycol 3350 (MIRALAX) Powder MIX 17 GRAMS IN 8 OUNCES OF LIQUID THEN TAKE BY MOUTH EVERY  g 4   • ipratropium-albuterol (DUONEB) 0.5-2.5 (3) MG/3ML nebulizer solution USE ONE VIAL VIA NEBULIZER EVERY 4 HOURS AS NEEDED FOR SHORTNESS OF BREATH. 90 mL 2   • acyclovir (ZOVIRAX) 5 % Ointment Apply 1 Application to affected area(s) every 3 hours. 1 Tube 3   • aspirin 81 MG tablet Take 81 mg by mouth every day.     • Nutritional Supplements (ENSURE NUTRITION SHAKE) LIQD Take 1 Bottle by mouth every day. 24 Bottle 12     No current facility-administered medications for this visit.           Allergies: Bactrim ds and Spiriva      ROS   Gen: Denies fever, chills, unintentional weight loss, fatigue, night sweats  E/N/T: Denies ear pain, nasal congestion  Resp:Denies  wheezing, cough, sputum production, hemoptysis  CV: Denies chest pain, chest tightness, palpitations, BLE edema  Sleep:Denies morning headache, insomnia, daytime somnolence, snoring, gasping for air, apnea  Neuro: Denies frequent headaches, weakness, dizziness  GI: Denies N/V, acid reflux/heartburn  See HPI.  All other systems reviewed and negative      Vital signs for this encounter:  Vitals:    09/22/17 1408   Height: 1.664 m (5' 5.5\")   Weight: 59.9 kg (132 lb)   Weight % change since last entry.: 0 %   BP: 110/82   Pulse: 80   BMI (Calculated): 21.63   Resp: 16   Temp: 37 °C (98.6 °F)   O2 sat % room air: 93 %                 Physical Exam:   Appearance: well developed, well nourished, no acute distress.  Eyes: PERRL, EOM intact, sclere white, conjunctiva moist.  Ears: no lesions or deformities.  Hearing: grossly intact.  Nose: no " lesions or deformities.  Dentition: good dentition.  Oropharynx: tongue normal, posterior pharynx without erythema or exudate.  Neck: supple, trachea midline, no masses.  Respiratory effort: no intercostal retractions or use of accessory muscles.  Lung auscultation: Bilateral diminished With poor air movement  Heart auscultation: no murmur, rub, or gallop.   Extremities: no cyanosis or edema.  Abdomen: soft, non-tender, no masses.  Gait and station: grossly normal   Digits and Nails: no clubbing, cyanosis, petechiae, or nodes.  Cranial nerves: grossly normal.  Motor: no focal deficits observed.  Skin: no rashes, lesions, or ulcers noted.  Orientation: oriented to time, place, and person.  Mood and affect: mood and affect appropriate, normal interaction with examiner.    Assessment   1. Pulmonary emphysema, unspecified emphysema type (CMS-HCC)  REFERRAL TO DERMATOLOGY    INFLUENZA VACCINE, HIGH DOSE (65+ ONLY)   2. Nocturnal oxygen desaturation         Patient was seen for 35 minutes, more than 50% of time spent in face to face review, counseling, and arranging future evaluation and follow up of medical conditions and care.     PLAN:   Patient Instructions   1) Continue Symbicort 160/4.5 2 puffs, twice a day.   2) Continue rescue and nebulizer as needed    2) Vaccines: Up to date with Pneumovax 23 and Prevnar 13. Flu today.  3) Continue using oxygen at 2L nocturnally  4) Zpack and Medrol pack for emergency  5) Continue weekly exercise  6) Astelin nasal spray for rhinitis  7) Return in about 6 months (around 3/22/2018) for review of symptoms, if not sooner, follow up with LU Voss.  8) Referral to derm

## 2017-09-22 NOTE — PATIENT INSTRUCTIONS
1) Continue Symbicort 160/4.5 2 puffs, twice a day.   2) Continue rescue and nebulizer as needed    2) Vaccines: Up to date with Pneumovax 23 and Prevnar 13. Flu today.  3) Continue using oxygen at 2L nocturnally  4) Zpack and Medrol pack for emergency  5) Continue weekly exercise  6) Astelin nasal spray for rhinitis  7) Return in about 6 months (around 3/22/2018) for review of symptoms, if not sooner, follow up with LU Voss.

## 2017-11-17 DIAGNOSIS — J44.9 CHRONIC OBSTRUCTIVE PULMONARY DISEASE, UNSPECIFIED COPD TYPE (HCC): ICD-10-CM

## 2017-11-21 DIAGNOSIS — B00.1 HERPES LABIALIS: ICD-10-CM

## 2017-11-21 DIAGNOSIS — J44.9 CHRONIC OBSTRUCTIVE PULMONARY DISEASE, UNSPECIFIED COPD TYPE (HCC): ICD-10-CM

## 2017-11-21 RX ORDER — ALBUTEROL SULFATE 90 UG/1
2 AEROSOL, METERED RESPIRATORY (INHALATION) EVERY 6 HOURS PRN
Qty: 1 INHALER | Refills: 5 | Status: SHIPPED | OUTPATIENT
Start: 2017-11-21 | End: 2018-10-22

## 2017-11-21 RX ORDER — ALBUTEROL SULFATE 90 UG/1
2 AEROSOL, METERED RESPIRATORY (INHALATION) EVERY 6 HOURS PRN
COMMUNITY
End: 2017-11-21 | Stop reason: SDUPTHER

## 2017-11-21 RX ORDER — ALBUTEROL SULFATE 90 UG/1
2 AEROSOL, METERED RESPIRATORY (INHALATION) EVERY 6 HOURS PRN
Qty: 1 INHALER | Refills: 5 | Status: SHIPPED | OUTPATIENT
Start: 2017-11-21 | End: 2018-01-15 | Stop reason: SDUPTHER

## 2017-11-21 NOTE — TELEPHONE ENCOUNTER
Pharmacy is requesting an rx for ventolin, states that the pt's insurance company would like to change the rx to ventolin.      Last OV: 9/22/17- [facility]    Next OV: 3/16/18    DX: COPD    Medications: ventolin HFA

## 2017-11-21 NOTE — TELEPHONE ENCOUNTER
Have we ever prescribed this med? Yes.  If yes, what date? 4/27/2017 Proair    Last OV: 9/22/2017    Next OV: 3/16/2018    DX: COPD    Medications: Albuterol HFA

## 2018-01-15 DIAGNOSIS — J44.9 CHRONIC OBSTRUCTIVE PULMONARY DISEASE, UNSPECIFIED COPD TYPE (HCC): ICD-10-CM

## 2018-01-15 RX ORDER — IPRATROPIUM BROMIDE AND ALBUTEROL SULFATE 2.5; .5 MG/3ML; MG/3ML
SOLUTION RESPIRATORY (INHALATION)
Qty: 150 VIAL | Refills: 5 | Status: SHIPPED | OUTPATIENT
Start: 2018-01-15 | End: 2018-10-22

## 2018-01-15 RX ORDER — ALBUTEROL SULFATE 90 UG/1
2 AEROSOL, METERED RESPIRATORY (INHALATION) EVERY 6 HOURS PRN
Qty: 1 INHALER | Refills: 5 | Status: SHIPPED | OUTPATIENT
Start: 2018-01-15 | End: 2018-10-22 | Stop reason: SDUPTHER

## 2018-01-15 NOTE — TELEPHONE ENCOUNTER
Have we ever prescribed this med? Yes.  If yes, what date? Ventolin 11/21/2017, Duoneb 10/26/2017    Last OV: 9/22/2017    Next OV: 3/19/2018    DX: COPD    Medications: Ventolin, Duoneb

## 2018-03-19 ENCOUNTER — OFFICE VISIT (OUTPATIENT)
Dept: PULMONOLOGY | Facility: HOSPICE | Age: 68
End: 2018-03-19
Payer: MEDICARE

## 2018-03-19 VITALS
RESPIRATION RATE: 16 BRPM | WEIGHT: 131 LBS | TEMPERATURE: 99.1 F | OXYGEN SATURATION: 90 % | HEART RATE: 87 BPM | SYSTOLIC BLOOD PRESSURE: 122 MMHG | HEIGHT: 66 IN | DIASTOLIC BLOOD PRESSURE: 70 MMHG | BODY MASS INDEX: 21.05 KG/M2

## 2018-03-19 DIAGNOSIS — G47.34 NOCTURNAL OXYGEN DESATURATION: ICD-10-CM

## 2018-03-19 DIAGNOSIS — J43.9 PULMONARY EMPHYSEMA, UNSPECIFIED EMPHYSEMA TYPE (HCC): ICD-10-CM

## 2018-03-19 DIAGNOSIS — R09.02 HYPOXIA: ICD-10-CM

## 2018-03-19 PROCEDURE — 94761 N-INVAS EAR/PLS OXIMETRY MLT: CPT | Performed by: NURSE PRACTITIONER

## 2018-03-19 PROCEDURE — 99214 OFFICE O/P EST MOD 30 MIN: CPT | Performed by: NURSE PRACTITIONER

## 2018-03-19 RX ORDER — METHYLPREDNISOLONE 4 MG/1
TABLET ORAL
Qty: 21 TAB | Refills: 0 | Status: SHIPPED | OUTPATIENT
Start: 2018-03-19 | End: 2019-03-25 | Stop reason: SDUPTHER

## 2018-03-19 RX ORDER — AZITHROMYCIN 250 MG/1
TABLET, FILM COATED ORAL
Qty: 6 TAB | Refills: 0 | Status: SHIPPED | OUTPATIENT
Start: 2018-03-19 | End: 2019-03-25

## 2018-03-19 NOTE — PROCEDURES
Multi-Ox Readings  Multi Ox #1 Room air   O2 sat % at rest 93   O2 sat % on exertion 90   O2 sat average on exertion 88   Multi Ox #2 2 LPM   O2 sat % at rest 93   O2 sat % on exertion 91   O2 sat average on exertion 92     Oxygen Use 2   Oxygen Frequency Nocturnal   Duration of need     Is the patient mobile within the home?     CPAP Use?     BIPAP Use?     Servo Titration

## 2018-03-19 NOTE — PATIENT INSTRUCTIONS
1) Continue Symbicort 160/4.5 and add Incruse  2) Continue rescue and nebulizer as needed    2) Vaccines: Up to date with Pneumovax 23 and Prevnar 13. Flu today.  3) Continue using oxygen at 2L nocturnally and with exertion  4) Zpack and Medrol pack for emergency  5) Continue weekly exercise  6) Astelin nasal spray for rhinitis  7) Return in about 6 weeks (around 4/30/2018) for review of symptoms, if not sooner, follow up with LU Voss, CAT scan results.

## 2018-03-19 NOTE — PROGRESS NOTES
CC:  Here for f/u sleep and pulmonary issues as listed below    HPI:   Jigar presents today for follow up for emphysema.  PFTs from 9/2017 have declined since 2015 indicate a Fev1 of 1.23L or 35% predicted with a mild bronchodilator response, Fev1/FVC ratio of 33, DLCO 48%.  He is a smoker of 45 pack years who quit almost 2 years ago. Had CAT scan performed September 1, 2015 with no evidence of thrombosis however demonstrates severe emphysema.  Patient has a history of a aneurysm and encouraged to follow-up with cardiology, but has not followed up. Referred to lung cancer screening, but declines cat scan. Understands the benefits of early detection of lung CA and would rather have a CT instead of referral to program.     Patient is compliant using Symbicort 160-4.5, 2 puffs twice a day with mouth rinse, has been using his rescue inhaler appx 1x/day. Dyspnea is worsening since last OV.  Complete a Multi-ox on room air at rest he was 93% but desaturated to 88% with exertion. On 2 L of oxygen he saturated well to a low of 91%. He required emergency antibiotic and medrol apck with benefit since last OV. Chronic cough resolved. He trialed Prilosec for 2 weeks without any change. He does have nasal drainage with clear mucus that has increased with oxygen. He has bloating that comes and goes, but does not have a primary to refer to GI. He denies wheeze, chest pain or chest tightness, fever and chills. He works out weekly lifting weights.      HSS from 7/2017 indicated an AHI of 3.2 and low oxygen of 82% with time below 88% of 5 hours and 49 minutes.  He required an overnight oximetry which showed time below oxygenation of 88% of 47 minutes. He has been using 2 L of oxygen at night. Patient is currently sleeping 2-3 hours then he wakes, because of tinnitus to go bathroom and with 2-3x nighttime awakenings. They sometimes have trouble falling asleep.  They do not feel refreshed in the morning and denies morning H/A. They  feel tired throughout the day and tries naps for appx 1 hour long.  Patient reports snoring, apnea events and paroxysmal nocturnal dyspnea events.        Patient Active Problem List    Diagnosis Date Noted   • Hypoxia 03/21/2018   • Nocturnal oxygen desaturation 07/20/2017   • Need for Zostavax administration 10/09/2015   • Herpes labialis 10/09/2015   • Aortic aneurysm (CMS-HCC) 11/04/2014   • Unintentional weight loss 09/22/2014   • Cluster headaches 09/22/2014   • Neck mass 09/22/2014   • COPD (chronic obstructive pulmonary disease) (CMS-HCC) 09/22/2014       Past Medical History:   Diagnosis Date   • Aortic aneurysm (CMS-HCC)    • Bronchitis    • Chronic airway obstruction, not elsewhere classified    • COPD (chronic obstructive pulmonary disease) (CMS-HCC)        Past Surgical History:   Procedure Laterality Date   • KNEE ARTHROSCOPY     • KNEE ORIF         Family History   Problem Relation Age of Onset   • Cancer Mother      Type unspec.   • Cancer Father      Type unspec.       Social History     Social History   • Marital status:      Spouse name: N/A   • Number of children: N/A   • Years of education: N/A     Occupational History   • Not on file.     Social History Main Topics   • Smoking status: Former Smoker     Packs/day: 1.00     Years: 50.00     Types: Cigarettes     Quit date: 7/20/2014   • Smokeless tobacco: Never Used   • Alcohol use No   • Drug use: No   • Sexual activity: Yes     Partners: Female      Comment: Off and on     Other Topics Concern   • Not on file     Social History Narrative   • No narrative on file       Current Outpatient Prescriptions   Medication Sig Dispense Refill   • MethylPREDNISolone (MEDROL DOSEPAK) 4 MG Tablet Therapy Pack Take as directed. 21 Tab 0   • azithromycin (ZITHROMAX) 250 MG Tab Take 2 tablets on day 1, then take 1 tablet a day for 4 days. 6 Tab 0   • Fluticasone-Umeclidin-Vilant (TRELEGY ELLIPTA) 100-62.5-25 MCG/INH AEROSOL POWDER, BREATH ACTIVATED  Inhale 1 Puff by mouth every day. 1 Each 11   • albuterol (PROAIR HFA) 108 (90 Base) MCG/ACT Aero Soln inhalation aerosol Inhale 2 Puffs by mouth every 6 hours as needed. 1 Inhaler 5   • oxycodone immediate release (ROXICODONE) 10 MG immediate release tablet      • Umeclidinium Bromide (INCRUSE ELLIPTA) 62.5 MCG/INH AEROSOL POWDER, BREATH ACTIVATED Inhale 1 Puff by mouth every day. 1 Each 11   • albuterol (VENTOLIN HFA) 108 (90 Base) MCG/ACT Aero Soln inhalation aerosol Inhale 2 Puffs by mouth every 6 hours as needed for Shortness of Breath. 1 Inhaler 5   • ipratropium-albuterol (DUONEB) 0.5-2.5 (3) MG/3ML nebulizer solution USE ONE VIAL VIA NEBULIZER EVERY 4 HOURS AS NEEDED FOR SHORTNESS OF BREATH. 150 Vial 5   • ALBUTEROL SULFATE HFA INH Inhale 2 Inhalation by mouth every four hours as needed.     • azithromycin (ZITHROMAX) 250 MG Tab Take 2 tablets on day 1, then take 1 tablet a day for 4 days. (Patient not taking: Reported on 3/19/2018) 6 Tab 0   • MethylPREDNISolone (MEDROL DOSEPAK) 4 MG Tablet Therapy Pack Take as directed. (Patient not taking: Reported on 3/19/2018) 21 Tab 0   • polyethylene glycol 3350 (MIRALAX) Powder MIX 17 GRAMS IN 8 OUNCES OF LIQUID THEN TAKE BY MOUTH EVERY  g 4   • aspirin 81 MG tablet Take 81 mg by mouth every day.     • Nutritional Supplements (ENSURE NUTRITION SHAKE) LIQD Take 1 Bottle by mouth every day. 24 Bottle 12     No current facility-administered medications for this visit.           Allergies: Bactrim ds and Spiriva      ROS   Gen: Denies fever, chills, unintentional weight loss, fatigue, night sweats  E/N/T: Denies ear pain, nasal congestion  Resp:Denies  hemoptysis  CV: Denies chest pain, chest tightness, palpitations, BLE edema  Sleep:Denies morning headache, insomnia, daytime somnolence, snoring, gasping for air, apnea  Neuro: Denies frequent headaches, weakness, dizziness  GI: Denies N/V, acid reflux/heartburn  See HPI.  All other systems reviewed and  "negative      Vital signs for this encounter:  Vitals:    03/19/18 1401   Height: 1.664 m (5' 5.5\")   Weight: 59.4 kg (131 lb)   Weight % change since last entry.: 0 %   BP: 122/70   Pulse: 87   BMI (Calculated): 21.47   Resp: 16   Temp: 37.3 °C (99.1 °F)   O2 sat % room air: 90 %       Multi-Ox Readings  Multi Ox #1 Room air   O2 sat % at rest 93   O2 sat % on exertion 90   O2 sat average on exertion 88   Multi Ox #2 2 LPM   O2 sat % at rest 93   O2 sat % on exertion 91   O2 sat average on exertion 92     Oxygen Use 2   Oxygen Frequency Nocturnal   Duration of need     Is the patient mobile within the home?     CPAP Use?     BIPAP Use?     Servo Titration                   Physical Exam:   Appearance: well developed, well nourished, no acute distress.  Eyes: PERRL, EOM intact, sclere white, conjunctiva moist.  Ears: no lesions or deformities.  Hearing: grossly intact.  Nose: no lesions or deformities.  Dentition: good dentition.  Oropharynx: tongue normal, posterior pharynx without erythema or exudate.  Neck: supple, trachea midline, no masses.  Respiratory effort: no intercostal retractions or use of accessory muscles.  Lung auscultation: Poor air movement in the bases.  Heart auscultation: no murmur, rub, or gallop.   Extremities: no cyanosis or edema.  Abdomen: soft, non-tender, no masses.  Gait and station: grossly normal   Digits and Nails: no clubbing, cyanosis, petechiae, or nodes.  Cranial nerves: grossly normal.  Motor: no focal deficits observed.  Skin: no rashes, lesions, or ulcers noted.  Orientation: oriented to time, place, and person.  Mood and affect: mood and affect appropriate, normal interaction with examiner.    Assessment   1. Pulmonary emphysema, unspecified emphysema type (CMS-HCC)  AMB MULTIPLE OXIMETRY    CT-CHEST (THORAX) W/O    AMB MULTIPLE OXIMETRY   2. Hypoxia     3. Nocturnal oxygen desaturation         Patient was seen for 45 minutes, more than 50% of time spent in face to face " review, counseling, and arranging future evaluation and follow up of medical conditions and care.     PLAN:   Patient Instructions   1) Continue Symbicort 160/4.5 and start Incruse  2) Continue rescue and nebulizer as needed    2) Vaccines: Up to date with Pneumovax 23 and Prevnar 13. Flu today.  3) Continue using oxygen at 2L nocturnally and with exertion  4) Zpack and Medrol pack for emergency  5) Continue weekly exercise  6) Astelin nasal spray for rhinitis  7) Return in about 6 weeks (around 4/30/2018) for review of symptoms, if not sooner, follow up with LU Voss, CAT scan results.  8) Find primary to establish care. Encourage to f/u with cardiology with hx of aortic anuerysm.

## 2018-03-20 ENCOUNTER — TELEPHONE (OUTPATIENT)
Dept: PULMONOLOGY | Facility: HOSPICE | Age: 68
End: 2018-03-20

## 2018-03-20 NOTE — TELEPHONE ENCOUNTER
MEDICATION PRIOR AUTHORIZATION NEEDED:    1. Name of Medication: Trelegy Ellipta 100/62.5/25 mcg    2. Requested By (Name of Pharmacy): [facility]     3. Is insurance on file current? yes    4. What is the name & phone number of the 3rd party payor? [employer] [phone]

## 2018-03-21 ENCOUNTER — TELEPHONE (OUTPATIENT)
Dept: PULMONOLOGY | Facility: HOSPICE | Age: 68
End: 2018-03-21

## 2018-03-21 DIAGNOSIS — J44.9 CHRONIC OBSTRUCTIVE PULMONARY DISEASE, UNSPECIFIED COPD TYPE (HCC): ICD-10-CM

## 2018-03-21 PROBLEM — R09.02 HYPOXIA: Status: ACTIVE | Noted: 2018-03-21

## 2018-03-21 NOTE — TELEPHONE ENCOUNTER
MEDICATION PRIOR AUTHORIZATION NEEDED:    1. Name of Medication: Incruse Ellipta 62.5 mcg    2. Requested By (Name of Pharmacy): [facility]     3. Is insurance on file current? yes    4. What is the name & phone number of the 3rd party payor? [employer] [phone]

## 2018-03-21 NOTE — TELEPHONE ENCOUNTER
DOCUMENTATION OF PRIOR AUTH STATUS    1. Medication name and dose: Trelegy 100/62.5/25 mcg    2. Name and Phone # of Prescription coverage company: [employer] [phone]    3. Date Prior Auth was submitted: 3/20/2018    4. What information was given to obtain insurance decision: Clinical notes    5. Prior Auth letter Approved or Denied: Denied    6. Pharmacy notified: No    7. Patient notified: No        Trelegy 100/62.5/25 mcg denied.  The patient must first try:  Anoro, Bevespi, Serevent and Stiolto.  Dx is COPD.  Please advise, thank you.

## 2018-03-21 NOTE — TELEPHONE ENCOUNTER
Lets start him instead on Incruse. Called patient about Spiriva allergy. He did not find it effective, but no allergic reaction.

## 2018-03-22 NOTE — TELEPHONE ENCOUNTER
DOCUMENTATION OF PRIOR AUTH STATUS    1. Medication name and dose: Incruse Ellipta 62.5 mcg    2. Name and Phone # of Prescription coverage company: [employer] [phone]    3. Date Prior Auth was submitted: 3/21/2018    4. What information was given to obtain insurance decision: Clinical notes    5. Prior Auth letter Approved or Denied: Denied    6. Pharmacy notified: No    7. Patient notified: No        Incruse Ellipta 62.5 mcg was denied.  The patient needs to first try:  Anoro, Bevespi, Serevent and Stiolto.  Dx is COPD.  Please advise, thank you.

## 2018-03-22 NOTE — TELEPHONE ENCOUNTER
Leslie,     Is there anyway I can argue that I am trying to step up his therapy due to increase in symptoms and the options are actually stepping down his therapy, potentially causing an exacerbation of COPD and with that possibly hospitalization?

## 2018-03-22 NOTE — TELEPHONE ENCOUNTER
Either, or.  I can keep the symbicort and put him on Incruse. Or place him on Trelegy and get rid of Symbicort.  Whichever they are willing to do. TY!

## 2018-04-11 RX ORDER — BUDESONIDE AND FORMOTEROL FUMARATE DIHYDRATE 160; 4.5 UG/1; UG/1
AEROSOL RESPIRATORY (INHALATION)
COMMUNITY
Start: 2018-02-17 | End: 2018-04-13 | Stop reason: SDUPTHER

## 2018-04-11 NOTE — TELEPHONE ENCOUNTER
"Patient called states [facility] had informed him \"incruse is a new inhaler and they will have to order it.\" I explained to the patient it's not new. I informed him as below, he states he doesn't have incruse or the trelegy. I spoke to the pharmacist Cyn at [facility] pharmacy she states yes incruse isn't always at their pharmacy and they will have to order it. She conformed they received both incruse and the Trelegy. Trelegy is on hold \" maybe per cost\" and they have the incruse ready for . Cyn stated last rx for Symbicort was prescribed 3/27/18 and they have printed out information for the patient to review.   "

## 2018-04-11 NOTE — TELEPHONE ENCOUNTER
Pt called to make sure that he was to be taking the rx Incruse and Symbicort at the same time.  According to previous notes there weren't any mention that the pt was to stop taking Symbicort and per Iman Culver the pt is to remain on both.

## 2018-04-12 NOTE — TELEPHONE ENCOUNTER
I called the patient informed they pharmacy does have incruse ready for him to . He should be on Symbicort and incruse together. He did conform he did get his last Symbicort refill 3/27/18 I informed him I will have them sign a new RX for Symbicort before he runs out on his last one he states has 80 inhalations left. encouraged to get the incruse and use with the symbicort, or just use the Trelegy depending on cost. Patient stated the Trelegy was placed on hold  Because it wasn't covered through his insurance. Patient is aware and conformed will use symbicort with incruse. Instructions given for both inhalers and mouth rinse

## 2018-04-16 RX ORDER — BUDESONIDE AND FORMOTEROL FUMARATE DIHYDRATE 160; 4.5 UG/1; UG/1
AEROSOL RESPIRATORY (INHALATION)
Qty: 1 INHALER | Refills: 5 | Status: SHIPPED | OUTPATIENT
Start: 2018-04-16 | End: 2019-01-10

## 2018-04-16 NOTE — TELEPHONE ENCOUNTER
Have we ever prescribed this med? Yes.  If yes, what date? 2/17/18    Last OV: 3/19/18    Next OV: 5/11/18    DX: Pulmonary emphysema, unspecified emphysema type (CMS-HCC)    Medications:SYMBICORT 160-4.5 MCG/ACT Aerosol

## 2018-04-18 RX ORDER — BUDESONIDE AND FORMOTEROL FUMARATE DIHYDRATE 160; 4.5 UG/1; UG/1
2 AEROSOL RESPIRATORY (INHALATION) 2 TIMES DAILY
Qty: 1 INHALER | Refills: 11 | Status: SHIPPED | OUTPATIENT
Start: 2018-04-18 | End: 2018-10-23 | Stop reason: SDUPTHER

## 2018-04-24 ENCOUNTER — HOSPITAL ENCOUNTER (OUTPATIENT)
Dept: RADIOLOGY | Facility: MEDICAL CENTER | Age: 68
End: 2018-04-24
Attending: NURSE PRACTITIONER
Payer: MEDICARE

## 2018-04-24 DIAGNOSIS — J43.9 PULMONARY EMPHYSEMA, UNSPECIFIED EMPHYSEMA TYPE (HCC): ICD-10-CM

## 2018-04-24 PROCEDURE — 71250 CT THORAX DX C-: CPT

## 2018-05-04 ENCOUNTER — TELEPHONE (OUTPATIENT)
Dept: PULMONOLOGY | Facility: HOSPICE | Age: 68
End: 2018-05-04

## 2018-05-04 NOTE — TELEPHONE ENCOUNTER
I called the patient and let him know that Incruse was approved on appeal.  He wants to talk to Iman Culver at his appt next Friday before he starts the Incruse, though.  I told him that is fine.  He will be here for his appt and CT results.

## 2018-05-04 NOTE — TELEPHONE ENCOUNTER
We discussed this at the end of March for this patient. You were either going to put him on Trelegy or Incruse and Symbicort. I did an appeal for both Incruse and   Trelegy and the Incruse got overturned.  So should I put in an Rx for Incruse to go with his Symbicort?  Please advise, thank you.

## 2018-05-11 ENCOUNTER — OFFICE VISIT (OUTPATIENT)
Dept: PULMONOLOGY | Facility: HOSPICE | Age: 68
End: 2018-05-11
Payer: MEDICARE

## 2018-05-11 VITALS
OXYGEN SATURATION: 92 % | TEMPERATURE: 97.7 F | HEART RATE: 98 BPM | HEIGHT: 66 IN | SYSTOLIC BLOOD PRESSURE: 100 MMHG | BODY MASS INDEX: 20.09 KG/M2 | DIASTOLIC BLOOD PRESSURE: 66 MMHG | RESPIRATION RATE: 16 BRPM | WEIGHT: 125 LBS

## 2018-05-11 DIAGNOSIS — G47.34 NOCTURNAL OXYGEN DESATURATION: ICD-10-CM

## 2018-05-11 DIAGNOSIS — R09.02 HYPOXIA: ICD-10-CM

## 2018-05-11 DIAGNOSIS — J43.9 PULMONARY EMPHYSEMA, UNSPECIFIED EMPHYSEMA TYPE (HCC): ICD-10-CM

## 2018-05-11 PROCEDURE — 99214 OFFICE O/P EST MOD 30 MIN: CPT | Performed by: NURSE PRACTITIONER

## 2018-05-11 RX ORDER — POLYETHYLENE GLYCOL 3350 17 G/17G
17 POWDER, FOR SOLUTION ORAL DAILY
Qty: 527 G | Refills: 5 | Status: SHIPPED | OUTPATIENT
Start: 2018-05-11 | End: 2019-05-12 | Stop reason: SDUPTHER

## 2018-05-11 NOTE — PROGRESS NOTES
CC:  Here for f/u pulmonary issues as listed below    HPI:   Jigar presents today for follow up for ***. PFTs from *** indicate a Fev1 of ***L or ***% predicted with a *** bronchodilator response, Fev1/FVC ratio of ***, DLCO ***%.        Patient Active Problem List    Diagnosis Date Noted   • Hypoxia 03/21/2018   • Nocturnal oxygen desaturation 07/20/2017   • Need for Zostavax administration 10/09/2015   • Herpes labialis 10/09/2015   • Aortic aneurysm (HCC) 11/04/2014   • Unintentional weight loss 09/22/2014   • Cluster headaches 09/22/2014   • Neck mass 09/22/2014   • COPD (chronic obstructive pulmonary disease) (HCC) 09/22/2014       Past Medical History:   Diagnosis Date   • Aortic aneurysm (HCC)    • Bronchitis    • Chronic airway obstruction, not elsewhere classified    • COPD (chronic obstructive pulmonary disease) (HCC)        Past Surgical History:   Procedure Laterality Date   • KNEE ARTHROSCOPY     • KNEE ORIF         Family History   Problem Relation Age of Onset   • Cancer Mother      Type unspec.   • Cancer Father      Type unspec.       Social History   Substance Use Topics   • Smoking status: Former Smoker     Packs/day: 1.00     Years: 50.00     Types: Cigarettes     Quit date: 7/20/2014   • Smokeless tobacco: Never Used   • Alcohol use No       Current Outpatient Prescriptions   Medication Sig Dispense Refill   • SYMBICORT 160-4.5 MCG/ACT Aerosol Inhale 2 Puffs by mouth 2 Times a Day. With mouth rinse 1 Inhaler 11   • albuterol (VENTOLIN HFA) 108 (90 Base) MCG/ACT Aero Soln inhalation aerosol Inhale 2 Puffs by mouth every 6 hours as needed for Shortness of Breath. 1 Inhaler 5   • polyethylene glycol 3350 (MIRALAX) Powder MIX 17 GRAMS IN 8 OUNCES OF LIQUID THEN TAKE BY MOUTH EVERY  g 4   • SYMBICORT 160-4.5 MCG/ACT Aerosol INHALE TWO PUFFS BY MOUTH TWICE DAILY 1 Inhaler 5   • Umeclidinium Bromide (INCRUSE ELLIPTA) 62.5 MCG/INH AEROSOL POWDER, BREATH ACTIVATED Inhale 1 Puff by mouth every  day. 1 Each 11   • MethylPREDNISolone (MEDROL DOSEPAK) 4 MG Tablet Therapy Pack Take as directed. (Patient not taking: Reported on 5/11/2018) 21 Tab 0   • azithromycin (ZITHROMAX) 250 MG Tab Take 2 tablets on day 1, then take 1 tablet a day for 4 days. (Patient not taking: Reported on 5/11/2018) 6 Tab 0   • Fluticasone-Umeclidin-Vilant (TRELEGY ELLIPTA) 100-62.5-25 MCG/INH AEROSOL POWDER, BREATH ACTIVATED Inhale 1 Puff by mouth every day. 1 Each 11   • ipratropium-albuterol (DUONEB) 0.5-2.5 (3) MG/3ML nebulizer solution USE ONE VIAL VIA NEBULIZER EVERY 4 HOURS AS NEEDED FOR SHORTNESS OF BREATH. 150 Vial 5   • albuterol (PROAIR HFA) 108 (90 Base) MCG/ACT Aero Soln inhalation aerosol Inhale 2 Puffs by mouth every 6 hours as needed. 1 Inhaler 5   • ALBUTEROL SULFATE HFA INH Inhale 2 Inhalation by mouth every four hours as needed.     • azithromycin (ZITHROMAX) 250 MG Tab Take 2 tablets on day 1, then take 1 tablet a day for 4 days. (Patient not taking: Reported on 3/19/2018) 6 Tab 0   • MethylPREDNISolone (MEDROL DOSEPAK) 4 MG Tablet Therapy Pack Take as directed. (Patient not taking: Reported on 3/19/2018) 21 Tab 0   • oxycodone immediate release (ROXICODONE) 10 MG immediate release tablet      • aspirin 81 MG tablet Take 81 mg by mouth every day.     • Nutritional Supplements (ENSURE NUTRITION SHAKE) LIQD Take 1 Bottle by mouth every day. 24 Bottle 12     No current facility-administered medications for this visit.           Allergies: Bactrim ds and Spiriva          ROS ***  Gen: Denies fever, chills, unintentional weight loss, fatigue, night sweats  E/N/T: Denies nasal congestion, ear pain  Resp:Denies Dyspnea, wheezing, cough, sputum production, hemoptysis  CV: Denies chest pain, chest tightness, palpitations  Sleep:Denies morning headache  Neuro: Denies frequent headaches, weakness, dizziness  GI: Denies acid reflux, N/V  See HPI.  All other systems reviewed and negative          Vital signs for this  "encounter:  Vitals:    05/11/18 1337   Height: 1.664 m (5' 5.5\")   Weight: 56.7 kg (125 lb)   Weight % change since last entry.: 0 %   BP: 100/66   Pulse: 98   BMI (Calculated): 20.48   Resp: 16   Temp: 36.5 °C (97.7 °F)   O2 sat % room air: 92 %                 Physical Exam:   Appearance: well developed, well nourished, no acute distress. ***  Eyes: PERRL, EOM intact, sclere white, conjunctiva moist.  Ears: no lesions or deformities.  Hearing: grossly intact.  Nose: no lesions or deformities.  Dentition: good dentition.   Oropharynx: tongue normal, posterior pharynx without erythema or exudate.  Neck: supple, trachea midline, no masses.  Respiratory effort: no intercostal retractions or use of accessory muscles.  Lung auscultation: Bilateral diminished ***  Heart auscultation: no murmur, rub, or gallop ***  Extremities: no cyanosis or edema.  Abdomen: soft, non-tender, no masses.  Gait and station: without difficulty ***  Digits and Nails: no clubbing, cyanosis, petechiae, or nodes.  Cranial nerves: grossly normal.  Motor: no focal deficits observed.   Skin: no rashes, lesions, or ulcers noted.  Orientation: oriented to time, place, and person.  Mood and affect: mood and affect appropriate, normal interaction with examiner.      Assessment   No diagnosis found.    Patient was seen for *** minutes, more than 50% of time spent in face to face review, counseling, and arranging future evaluation and follow up of medical conditions and care.     PLAN:   ***    "

## 2018-05-11 NOTE — PATIENT INSTRUCTIONS
1) Continue Symbicort 160/4.5 and start Incruse  2) Continue rescue and nebulizer as needed    2) Vaccines: Up to date with Pneumovax 23 and Prevnar 13.  3) Continue using oxygen at 2L nocturnally and with exertion  4) Zpack and Medrol pack for emergency  5) Continue weekly exercise  6) Astelin nasal spray for rhinitis  8) Find primary to establish care. Encourage to f/u with cardiology with hx of aortic anuerysm.   9) Return in about 3 months (around 8/23/2018), or *piror to maternity leave, for follow up with [facility], if not sooner, review of symptoms, 6 min walk.

## 2018-05-11 NOTE — PROGRESS NOTES
CC:  Here for f/u sleep and pulmonary issues as listed below    HPI:     Jigar presents today for follow up for emphysema and CT results.  PFTs from 9/2017 have declined since 2015 indicate a Fev1 of 1.23L or 35% predicted with a mild bronchodilator response, Fev1/FVC ratio of 33, DLCO 48%.  He is a smoker of 45 pack years who quit almost 2 years ago. Had CAT scan performed September 1, 2015 with no evidence of thrombosis however demonstrates severe emphysema.  Patient has a history of a aneurysm and encouraged to follow-up with cardiology, but has not followed up. Referred to lung cancer screening, but declines referral to the program.  Cat scan completed 4/24/2018 did not show mass or consolidation. Again emphysema and scattered areas of areas of peripheral fibrosis.      Patient is compliant using Symbicort 160-4.5, 2 puffs twice a day with mouth rinse, has been using his rescue inhaler appx 1-2x/day.  He has not started Incruse yet due to confusion. Dyspnea is same since last OV.  He qualified for 2L of oxygen with exertion, but has not been using it, because he was not provided proper equipment.  He required emergency antibiotic and medrol pack with benefit appx 6 months ago. Chronic cough resolved.  He has bloating that comes and goes, but does not have a primary to refer to GI. He denies wheeze, chest pain or chest tightness, fever and chills. He works out weekly lifting weights or daily bicycle.      HSS from 7/2017 indicated an AHI of 3.2 and low oxygen of 82% with time below 88% of 5 hours and 49 minutes.  He required an overnight oximetry which showed time below oxygenation of 88% of 47 minutes. He has been using 2 L of oxygen at night. Patient is currently sleeping 2-3 hours then he wakes, because of tinnitus to go bathroom and with 2-3x nighttime awakenings. They sometimes have trouble falling asleep.  They do not feel refreshed in the morning and denies morning H/A. They feel tired throughout the day  and tries naps for appx 1 hour long.  Patient reports snoring, apnea events and paroxysmal nocturnal dyspnea events.        Patient Active Problem List    Diagnosis Date Noted   • Hypoxia 03/21/2018   • Nocturnal oxygen desaturation 07/20/2017   • Need for Zostavax administration 10/09/2015   • Herpes labialis 10/09/2015   • Aortic aneurysm (HCC) 11/04/2014   • Unintentional weight loss 09/22/2014   • Cluster headaches 09/22/2014   • Neck mass 09/22/2014   • COPD (chronic obstructive pulmonary disease) (HCC) 09/22/2014       Past Medical History:   Diagnosis Date   • Aortic aneurysm (HCC)    • Bronchitis    • Chronic airway obstruction, not elsewhere classified    • COPD (chronic obstructive pulmonary disease) ([facility])        Past Surgical History:   Procedure Laterality Date   • KNEE ARTHROSCOPY     • KNEE ORIF         Family History   Problem Relation Age of Onset   • Cancer Mother      Type unspec.   • Cancer Father      Type unspec.       Social History     Social History   • Marital status:      Spouse name: N/A   • Number of children: N/A   • Years of education: N/A     Occupational History   • Not on file.     Social History Main Topics   • Smoking status: Former Smoker     Packs/day: 1.00     Years: 50.00     Types: Cigarettes     Quit date: 7/20/2014   • Smokeless tobacco: Never Used   • Alcohol use No   • Drug use: No   • Sexual activity: Yes     Partners: Female      Comment: Off and on     Other Topics Concern   • Not on file     Social History Narrative   • No narrative on file       Current Outpatient Prescriptions   Medication Sig Dispense Refill   • polyethylene glycol 3350 (MIRALAX) Powder Take 17 g by mouth every day. 527 g 5   • SYMBICORT 160-4.5 MCG/ACT Aerosol Inhale 2 Puffs by mouth 2 Times a Day. With mouth rinse 1 Inhaler 11   • albuterol (VENTOLIN HFA) 108 (90 Base) MCG/ACT Aero Soln inhalation aerosol Inhale 2 Puffs by mouth every 6 hours as needed for Shortness of Breath. 1 Inhaler 5    • SYMBICORT 160-4.5 MCG/ACT Aerosol INHALE TWO PUFFS BY MOUTH TWICE DAILY 1 Inhaler 5   • Umeclidinium Bromide (INCRUSE ELLIPTA) 62.5 MCG/INH AEROSOL POWDER, BREATH ACTIVATED Inhale 1 Puff by mouth every day. 1 Each 11   • MethylPREDNISolone (MEDROL DOSEPAK) 4 MG Tablet Therapy Pack Take as directed. (Patient not taking: Reported on 5/11/2018) 21 Tab 0   • azithromycin (ZITHROMAX) 250 MG Tab Take 2 tablets on day 1, then take 1 tablet a day for 4 days. (Patient not taking: Reported on 5/11/2018) 6 Tab 0   • ipratropium-albuterol (DUONEB) 0.5-2.5 (3) MG/3ML nebulizer solution USE ONE VIAL VIA NEBULIZER EVERY 4 HOURS AS NEEDED FOR SHORTNESS OF BREATH. 150 Vial 5   • albuterol (PROAIR HFA) 108 (90 Base) MCG/ACT Aero Soln inhalation aerosol Inhale 2 Puffs by mouth every 6 hours as needed. 1 Inhaler 5   • ALBUTEROL SULFATE HFA INH Inhale 2 Inhalation by mouth every four hours as needed.     • azithromycin (ZITHROMAX) 250 MG Tab Take 2 tablets on day 1, then take 1 tablet a day for 4 days. (Patient not taking: Reported on 3/19/2018) 6 Tab 0   • MethylPREDNISolone (MEDROL DOSEPAK) 4 MG Tablet Therapy Pack Take as directed. (Patient not taking: Reported on 3/19/2018) 21 Tab 0   • oxycodone immediate release (ROXICODONE) 10 MG immediate release tablet      • aspirin 81 MG tablet Take 81 mg by mouth every day.     • Nutritional Supplements (ENSURE NUTRITION SHAKE) LIQD Take 1 Bottle by mouth every day. 24 Bottle 12     No current facility-administered medications for this visit.           Allergies: Bactrim ds and Spiriva      ROS   Gen: Denies fever, chills, unintentional weight loss, fatigue, night sweats  E/N/T: Denies ear pain, nasal congestion  Resp:Denies wheezing, cough, sputum production, hemoptysis  CV: Denies chest pain, chest tightness, palpitations, BLE edema  Sleep:Denies morning headache, insomnia, daytime somnolence, snoring, gasping for air, apnea  Neuro: Denies frequent headaches, weakness, dizziness  GI:  "Denies N/V, acid reflux/heartburn  See HPI.  All other systems reviewed and negative      Vital signs for this encounter:  Vitals:    05/11/18 1337   Height: 1.664 m (5' 5.5\")   Weight: 56.7 kg (125 lb)   Weight % change since last entry.: 0 %   BP: 100/66   Pulse: 98   BMI (Calculated): 20.48   Resp: 16   Temp: 36.5 °C (97.7 °F)   O2 sat % room air: 92 %                 Physical Exam:   Appearance: well developed, well nourished, no acute distress.  Eyes: PERRL, EOM intact, sclere white, conjunctiva moist.  Ears: no lesions or deformities.  Hearing: grossly intact.  Nose: no lesions or deformities.  Dentition: good dentition.  Oropharynx: tongue normal, posterior pharynx without erythema or exudate.  Neck: supple, trachea midline, no masses.  Respiratory effort: no intercostal retractions or use of accessory muscles.  Lung auscultation: poor air movement  Heart auscultation: no murmur, rub, or gallop.   Extremities: no cyanosis or edema.  Abdomen: soft, non-tender, no masses.  Gait and station: grossly normal   Digits and Nails: no clubbing, cyanosis, petechiae, or nodes.  Cranial nerves: grossly normal.  Motor: no focal deficits observed.  Skin: no rashes, lesions, or ulcers noted.  Orientation: oriented to time, place, and person.  Mood and affect: mood and affect appropriate, normal interaction with examiner.    Assessment   1. Pulmonary emphysema, unspecified emphysema type (HCC)  AMB EXERCISE TEST FOR BRONCHOSPASM/6 MIN WALK   2. Nocturnal oxygen desaturation     3. Hypoxia         Patient was seen for 25 minutes, more than 50% of time spent in face to face review, counseling, and arranging future evaluation and follow up of medical conditions and care.     PLAN:   Patient Instructions   1) Continue Symbicort 160/4.5 and start Incruse  2) Continue rescue and nebulizer as needed    2) Vaccines: Up to date with Pneumovax 23 and Prevnar 13.  3) Continue using oxygen at 2L nocturnally and with exertion  4) Zpack " and Medrol pack for emergency  5) Continue weekly exercise  6) Astelin nasal spray for rhinitis  8) Find primary to establish care. Encourage to f/u with cardiology with hx of aortic anuerysm and bloating.   9) Return in about 3 months (around 8/23/2018), or *piror to maternity leave, for follow up with [facility], if not sooner, review of symptoms, 6 min walk.

## 2018-08-15 ENCOUNTER — NON-PROVIDER VISIT (OUTPATIENT)
Dept: PULMONOLOGY | Facility: HOSPICE | Age: 68
End: 2018-08-15
Attending: NURSE PRACTITIONER
Payer: MEDICARE

## 2018-08-15 ENCOUNTER — OFFICE VISIT (OUTPATIENT)
Dept: PULMONOLOGY | Facility: HOSPICE | Age: 68
End: 2018-08-15
Payer: MEDICARE

## 2018-08-15 VITALS
DIASTOLIC BLOOD PRESSURE: 70 MMHG | WEIGHT: 125 LBS | TEMPERATURE: 97.5 F | RESPIRATION RATE: 16 BRPM | SYSTOLIC BLOOD PRESSURE: 108 MMHG | OXYGEN SATURATION: 93 % | BODY MASS INDEX: 16.93 KG/M2 | HEART RATE: 82 BPM | HEIGHT: 72 IN

## 2018-08-15 DIAGNOSIS — J43.9 PULMONARY EMPHYSEMA, UNSPECIFIED EMPHYSEMA TYPE (HCC): ICD-10-CM

## 2018-08-15 DIAGNOSIS — R06.02 SHORTNESS OF BREATH: ICD-10-CM

## 2018-08-15 PROCEDURE — 94618 PULMONARY STRESS TESTING: CPT | Performed by: INTERNAL MEDICINE

## 2018-08-15 PROCEDURE — 99213 OFFICE O/P EST LOW 20 MIN: CPT | Performed by: NURSE PRACTITIONER

## 2018-08-15 RX ORDER — AZITHROMYCIN 250 MG/1
TABLET, FILM COATED ORAL
Qty: 6 TAB | Refills: 0 | Status: SHIPPED | OUTPATIENT
Start: 2018-08-15 | End: 2019-03-25 | Stop reason: SDUPTHER

## 2018-08-15 RX ORDER — METHYLPREDNISOLONE 4 MG/1
TABLET ORAL
Qty: 21 TAB | Refills: 0 | Status: SHIPPED | OUTPATIENT
Start: 2018-08-15 | End: 2019-03-25

## 2018-08-15 ASSESSMENT — 6 MINUTE WALK TEST (6MWT)
AMBULATES WITH O2: WITHOUT O2
PERCEIVED BREATHLESSNESS AT 2 MIN: 0
PERCEIVED FATIGUE AT 1 MIN: 0
PERCEIVED FATIGUE AT 6 MIN: 0
SAO2 AT 6 MIN: 91
BLOOD PRESSURE: LEFT ARM
PERCEIVED BREATHLESSNESS AT 4 MIN: 0
PERCEIVED BREATHLESSNESS AT 1 MIN: 0
O2 SAT PERCENT ROOM AIR: 92
HEART RATE: 77
SAO2 AT 3 MIN: 86
HEART RATE AT 1 MIN: 98
PERCEIVED FATIGUE AT 5 MIN: 0
PERCEIVED FATIGUE AT 4 MIN: 0
HEART RATE AT 5 MIN: 109
HEART RATE AT 3 MIN: 112
SAO2 AT 4 MIN: 92
PERCEIVED FATIGUE AT 2 MIN: 0
PERCEIVED BREATHLESSNESS AT 5 MIN: 0
SAO2 AT 5 MIN: 92
SITTING BLOOD PRESSURE: 108/77
HEART RATE AT 4 MIN: 109
HEART RATE AT 2 MIN: 111
SAO2 AT 2 MIN: 89
PERCEIVED BREATHLESSNESS AT 3 MIN: 0
PERCEIVED BREATHLESSNESS AT 6 MIN: 0
PERCEIVED FATIGUE AT 3 MIN: 0
HEART RATE AT 6 MIN: 109
SAO2 AT 1 MIN: 92

## 2018-08-15 NOTE — PROGRESS NOTES
CC:  Here for f/u sleep and pulmonary issues as listed below    HPI:   Jigar presents today for follow up for emphysema.      PFTs from 9/2017 have declined since 2015 indicate a Fev1 of 1.23L or 35% predicted with a mild bronchodilator response, Fev1/FVC ratio of 33, DLCO 48%.   He is a smoker of 45 pack years who quit almost 2 years ago. Had CAT scan performed September 1, 2015 with no evidence of thrombosis however demonstrates severe emphysema.  Patient has a history of a aneurysm and encouraged to follow-up with cardiology, but has not followed up in the last year.  Referred to lung cancer screening, but declines referral to the program.  Cat scan completed 4/24/2018 did not show mass or consolidation. Again emphysema and scattered areas of areas of peripheral fibrosis.      Patient is compliant using Symbicort 160-4.5, 2 puffs twice a day with mouth rinse, Incruse daily, has been using his rescue inhaler appx 1-2x/day.  Dyspnea is same since last OV.  He completed 39% of 6 minute walk today on room air at rest he was 92%.  He desaturated to a low of 86% with exertion and placed on 2 L of oxygen.  With exertion he went to a low of 91%.  He will continue to benefit from exertional oxygen.  He admits he has not been using it, because he was not provided proper equipment.  He required emergency antibiotic and medrol pack with benefit appx 2 months ago. Chronic cough resolved.  He reported last office visit that he has bloating that comes and goes, but does not have a primary to refer to GI. He denies wheeze, chest pain or chest tightness, fever and chills. He works out weekly lifting weights or daily bicycle.      HSS from 7/2017 indicated an AHI of 3.2 and low oxygen of 82% with time below 88% of 5 hours and 49 minutes.  He required an overnight oximetry which showed time below oxygenation of 88% of 47 minutes. He has been using 2 L of oxygen at night. Patient is currently sleeping 2-3 hours then he wakes,  because of tinnitus to go bathroom and with 2-3x nighttime awakenings. They sometimes have trouble falling asleep.  They do not feel refreshed in the morning and denies morning H/A. They feel tired throughout the day and tries naps for appx 1 hour long.  Patient reports snoring, apnea events and paroxysmal nocturnal dyspnea events.             Patient Active Problem List    Diagnosis Date Noted   • Hypoxia 03/21/2018   • Nocturnal oxygen desaturation 07/20/2017   • Need for Zostavax administration 10/09/2015   • Herpes labialis 10/09/2015   • Aortic aneurysm ([facility]) 11/04/2014   • Unintentional weight loss 09/22/2014   • Cluster headaches 09/22/2014   • Neck mass 09/22/2014   • COPD (chronic obstructive pulmonary disease) ([facility]) 09/22/2014       Past Medical History:   Diagnosis Date   • Aortic aneurysm ([facility])    • Bronchitis    • Chronic airway obstruction, not elsewhere classified    • COPD (chronic obstructive pulmonary disease) ([facility])        Past Surgical History:   Procedure Laterality Date   • KNEE ARTHROSCOPY     • KNEE ORIF         Family History   Problem Relation Age of Onset   • Cancer Mother         Type unspec.   • Cancer Father         Type unspec.       Social History     Social History   • Marital status:      Spouse name: N/A   • Number of children: N/A   • Years of education: N/A     Occupational History   • Not on file.     Social History Main Topics   • Smoking status: Former Smoker     Packs/day: 1.00     Years: 50.00     Types: Cigarettes     Quit date: 7/20/2014   • Smokeless tobacco: Never Used   • Alcohol use No   • Drug use: No   • Sexual activity: Yes     Partners: Female      Comment: Off and on     Other Topics Concern   • Not on file     Social History Narrative   • No narrative on file       Current Outpatient Prescriptions   Medication Sig Dispense Refill   • MethylPREDNISolone (MEDROL DOSEPAK) 4 MG Tablet Therapy Pack Take as directed. 21 Tab 0   • azithromycin (ZITHROMAX) 250 MG  Tab Take 2 tablets on day 1, then take 1 tablet a day for 4 days. 6 Tab 0   • polyethylene glycol 3350 (MIRALAX) Powder Take 17 g by mouth every day. 527 g 5   • SYMBICORT 160-4.5 MCG/ACT Aerosol Inhale 2 Puffs by mouth 2 Times a Day. With mouth rinse 1 Inhaler 11   • Umeclidinium Bromide (INCRUSE ELLIPTA) 62.5 MCG/INH AEROSOL POWDER, BREATH ACTIVATED Inhale 1 Puff by mouth every day. 1 Each 11   • albuterol (PROAIR HFA) 108 (90 Base) MCG/ACT Aero Soln inhalation aerosol Inhale 2 Puffs by mouth every 6 hours as needed. 1 Inhaler 5   • oxycodone immediate release (ROXICODONE) 10 MG immediate release tablet      • SYMBICORT 160-4.5 MCG/ACT Aerosol INHALE TWO PUFFS BY MOUTH TWICE DAILY 1 Inhaler 5   • MethylPREDNISolone (MEDROL DOSEPAK) 4 MG Tablet Therapy Pack Take as directed. (Patient not taking: Reported on 5/11/2018) 21 Tab 0   • azithromycin (ZITHROMAX) 250 MG Tab Take 2 tablets on day 1, then take 1 tablet a day for 4 days. (Patient not taking: Reported on 5/11/2018) 6 Tab 0   • albuterol (VENTOLIN HFA) 108 (90 Base) MCG/ACT Aero Soln inhalation aerosol Inhale 2 Puffs by mouth every 6 hours as needed for Shortness of Breath. 1 Inhaler 5   • ipratropium-albuterol (DUONEB) 0.5-2.5 (3) MG/3ML nebulizer solution USE ONE VIAL VIA NEBULIZER EVERY 4 HOURS AS NEEDED FOR SHORTNESS OF BREATH. 150 Vial 5   • ALBUTEROL SULFATE HFA INH Inhale 2 Inhalation by mouth every four hours as needed.     • aspirin 81 MG tablet Take 81 mg by mouth every day.     • Nutritional Supplements (ENSURE NUTRITION SHAKE) LIQD Take 1 Bottle by mouth every day. 24 Bottle 12     No current facility-administered medications for this visit.           Allergies: Bactrim ds and Spiriva      ROS   Gen: Denies fever, chills, unintentional weight loss, fatigue, night sweats  E/N/T: Denies ear pain, nasal congestion  Resp:Denies  wheezing, cough, sputum production, hemoptysis  CV: Denies chest pain, chest tightness, palpitations, BLE edema  Sleep:Denies  morning headache, insomnia, daytime somnolence, snoring, gasping for air, apnea  Neuro: Denies frequent headaches, weakness, dizziness  GI: Denies N/V, acid reflux/heartburn  See HPI.  All other systems reviewed and negative      Vital signs for this encounter:  Vitals:    08/15/18 1344   Height: 1.829 m (6')   Weight: 56.7 kg (125 lb)   Weight % change since last entry.: 0 %   BP: 108/70   Pulse: 82   BMI (Calculated): 16.95   Resp: 16   Temp: 36.4 °C (97.5 °F)   O2 sat % room air: 93 %                 Physical Exam:   Appearance: well developed, well nourished, no acute distress.  Eyes: PERRL, EOM intact, sclere white, conjunctiva moist.  Ears: no lesions or deformities.  Hearing: grossly intact.  Nose: no lesions or deformities.  Dentition: good dentition.  Oropharynx: tongue normal, posterior pharynx without erythema or exudate.  Neck: supple, trachea midline, no masses.  Respiratory effort: no intercostal retractions or use of accessory muscles.  Lung auscultation: Bilateral diminished   Heart auscultation: no murmur, rub, or gallop.   Extremities: no cyanosis or edema.  Abdomen: soft, non-tender, no masses.  Gait and station: grossly normal   Digits and Nails: no clubbing, cyanosis, petechiae, or nodes.  Cranial nerves: grossly normal.  Motor: no focal deficits observed.  Skin: no rashes, lesions, or ulcers noted.  Orientation: oriented to time, place, and person.  Mood and affect: mood and affect appropriate, normal interaction with examiner.    Assessment   1. Pulmonary emphysema, unspecified emphysema type (HCC)  AMB PULMONARY FUNCTION TEST/LAB       Patient was seen for 20 minutes, more than 50% of time spent in face to face review, counseling, and arranging future evaluation and follow up of medical conditions and care related to COPD and hypoxia pathophysiology including cardiac and neurologic risk factors. Patient is clinically stable and will proceed with following plan.      PLAN:   Patient  Instructions   1) Continue Symbicort 160/4.5 and Incruse  2) Continue rescue and nebulizer as needed    2) Vaccines: Up to date with Pneumovax 23 and Prevnar 13.  3) Continue using oxygen at 2L nocturnally and with exertion  4) Zpack and Medrol pack for emergency  5) Continue weekly exercise  6) Astelin nasal spray for rhinitis  8) Find primary to establish care. Encourage to f/u with cardiology with hx of aortic anuerysm and bloating.   5) Return in about 6 months (around 2/15/2019) for follow up with LU Voss, review of symptoms, if not sooner, pulmonary function results.

## 2018-08-15 NOTE — PATIENT INSTRUCTIONS
1) Continue Symbicort 160/4.5 and Start Incruse  2) Continue rescue and nebulizer as needed    2) Vaccines: Up to date with Pneumovax 23 and Prevnar 13.  3) Continue using oxygen at 2L nocturnally and with exertion  4) Zpack and Medrol pack for emergency  5) Continue weekly exercise  6) Astelin nasal spray for rhinitis  8) Find primary to establish care. Encourage to f/u with cardiology with hx of aortic anuerysm and bloating.   5) Return in about 6 months (around 2/15/2019) for follow up with LU Voss, review of symptoms, if not sooner, pulmonary function results.

## 2018-08-15 NOTE — PROCEDURES
Test started on Room Air in minute 3 added 02 2 lpm patient finished test on 2 lpm 02.    6 minute walk test achieved 39% of the desired distance, performed August 15, 2018.  On room air saturation was 92%, at 3 minutes saturations dropped and 2 L oxygen was added with maintenance of saturation subsequently.  Heart rate and blood pressure response appeared normal.

## 2018-10-22 DIAGNOSIS — J44.9 CHRONIC OBSTRUCTIVE PULMONARY DISEASE, UNSPECIFIED COPD TYPE (HCC): ICD-10-CM

## 2018-10-22 RX ORDER — ALBUTEROL SULFATE 90 UG/1
2 AEROSOL, METERED RESPIRATORY (INHALATION) EVERY 4 HOURS PRN
Qty: 1 INHALER | Refills: 5 | Status: SHIPPED | OUTPATIENT
Start: 2018-10-22 | End: 2018-11-21

## 2018-10-22 NOTE — TELEPHONE ENCOUNTER
Have we ever prescribed this med? Yes.  If yes, what date? 01/15/2018    Last OV: 08/15/2018 - Iman Culver    Next OV: 02/20/2019 - Dr. Thompson    DX: SOB    Medications: VENTOLIN

## 2018-10-23 DIAGNOSIS — J44.9 CHRONIC OBSTRUCTIVE PULMONARY DISEASE, UNSPECIFIED COPD TYPE (HCC): ICD-10-CM

## 2018-10-24 RX ORDER — BUDESONIDE AND FORMOTEROL FUMARATE DIHYDRATE 160; 4.5 UG/1; UG/1
AEROSOL RESPIRATORY (INHALATION)
Qty: 1 INHALER | Refills: 5 | Status: SHIPPED | OUTPATIENT
Start: 2018-10-24 | End: 2019-01-10

## 2018-10-24 NOTE — TELEPHONE ENCOUNTER
Have we ever prescribed this med? Yes.  If yes, what date? 4/18/18    Last OV: 8/15/18- Iman Culver    Next OV: 2/20/19- Iman Culver    DX: Copd    Medications: SYMBICORT 160-4.5 MCG/ACT

## 2019-01-04 ENCOUNTER — TELEPHONE (OUTPATIENT)
Dept: PULMONOLOGY | Facility: HOSPICE | Age: 69
End: 2019-01-04

## 2019-01-04 NOTE — TELEPHONE ENCOUNTER
MEDICATION PRIOR AUTHORIZATION NEEDED:    1. Name of Medication: Incruse Ellipta 62.5 mcg    2. Requested By (Name of Pharmacy): [facility]     3. Is insurance on file current? yes    4. What is the name & phone number of the 3rd party payor? [employer] [phone]

## 2019-01-08 NOTE — TELEPHONE ENCOUNTER
DOCUMENTATION OF PRIOR AUTH STATUS    1. Medication name and dose: Incruse ellipta 62.5 mcg    2. Name and Phone # of Prescription coverage company: [employer] [phone]    3. Date Prior Auth was submitted: 3/23/33456    4. What information was given to obtain insurance decision: Clinical notes    5. Prior Auth letter Approved or Denied: Denied    6. Pharmacy notified: No    7. Patient notified: No        Incruse Ellipta 62.5 mcg was denied.  The patient needs to try three of the following:  Anoro, Bevespi, Breo, Serevent and Stiolto.  Dx is COPD.  Please advise, thank you.

## 2019-01-11 NOTE — TELEPHONE ENCOUNTER
I called the patient to tell him that his insurance denied Incruse and he stated that they decided to cover it now.  So I told him to stay on it and don't  the Anoro or Arnuity at the pharmacy.

## 2019-01-16 NOTE — TELEPHONE ENCOUNTER
DOCUMENTATION OF PRIOR AUTH STATUS    1. Medication name and dose: Incruse Ellipta -APPEAL    2. Name and Phone # of Prescription coverage company: [employer]     3. Date Prior Auth was submitted: 1/7/2019    4. What information was given to obtain insurance decision: Clinical notes    5. Prior Auth letter Approved or Denied: Approved through 12/31/2019    6. Pharmacy notified: Yes    7. Patient notified: Yes

## 2019-02-20 ENCOUNTER — APPOINTMENT (OUTPATIENT)
Dept: PULMONOLOGY | Facility: HOSPICE | Age: 69
End: 2019-02-20
Payer: MEDICARE

## 2019-03-25 ENCOUNTER — OFFICE VISIT (OUTPATIENT)
Dept: PULMONOLOGY | Facility: HOSPICE | Age: 69
End: 2019-03-25
Payer: MEDICARE

## 2019-03-25 ENCOUNTER — NON-PROVIDER VISIT (OUTPATIENT)
Dept: PULMONOLOGY | Facility: HOSPICE | Age: 69
End: 2019-03-25
Payer: MEDICARE

## 2019-03-25 VITALS
DIASTOLIC BLOOD PRESSURE: 66 MMHG | HEART RATE: 87 BPM | BODY MASS INDEX: 17.92 KG/M2 | OXYGEN SATURATION: 92 % | SYSTOLIC BLOOD PRESSURE: 124 MMHG | RESPIRATION RATE: 16 BRPM | TEMPERATURE: 98.6 F | HEIGHT: 71 IN | WEIGHT: 128 LBS

## 2019-03-25 DIAGNOSIS — J43.9 PULMONARY EMPHYSEMA, UNSPECIFIED EMPHYSEMA TYPE (HCC): ICD-10-CM

## 2019-03-25 DIAGNOSIS — G47.34 NOCTURNAL OXYGEN DESATURATION: ICD-10-CM

## 2019-03-25 DIAGNOSIS — R09.02 HYPOXIA: ICD-10-CM

## 2019-03-25 PROCEDURE — 99214 OFFICE O/P EST MOD 30 MIN: CPT | Performed by: NURSE PRACTITIONER

## 2019-03-25 PROCEDURE — 94060 EVALUATION OF WHEEZING: CPT | Performed by: INTERNAL MEDICINE

## 2019-03-25 PROCEDURE — 94729 DIFFUSING CAPACITY: CPT | Performed by: INTERNAL MEDICINE

## 2019-03-25 PROCEDURE — 94726 PLETHYSMOGRAPHY LUNG VOLUMES: CPT | Performed by: INTERNAL MEDICINE

## 2019-03-25 RX ORDER — METHYLPREDNISOLONE 4 MG/1
TABLET ORAL
Qty: 21 TAB | Refills: 0 | Status: SHIPPED | OUTPATIENT
Start: 2019-03-25 | End: 2019-09-30 | Stop reason: SDUPTHER

## 2019-03-25 RX ORDER — BUDESONIDE AND FORMOTEROL FUMARATE DIHYDRATE 160; 4.5 UG/1; UG/1
2 AEROSOL RESPIRATORY (INHALATION) 2 TIMES DAILY
COMMUNITY
End: 2019-04-16 | Stop reason: SDUPTHER

## 2019-03-25 RX ORDER — ALBUTEROL SULFATE 90 UG/1
2 AEROSOL, METERED RESPIRATORY (INHALATION) EVERY 6 HOURS PRN
COMMUNITY
End: 2019-05-16 | Stop reason: SDUPTHER

## 2019-03-25 RX ORDER — AZITHROMYCIN 250 MG/1
TABLET, FILM COATED ORAL
Qty: 6 TAB | Refills: 0 | Status: SHIPPED | OUTPATIENT
Start: 2019-03-25 | End: 2019-09-30 | Stop reason: SDUPTHER

## 2019-03-25 ASSESSMENT — PULMONARY FUNCTION TESTS
FEV1/FVC_PERCENT_LLN: 62
FEV1/FVC_PERCENT_CHANGE: -50
FEV1/FVC: 36
FEV1/FVC_PREDICTED: 74
FEV1/FVC_PERCENT_CHANGE: 7
FVC_PERCENT_PREDICTED: 82
FEV1/FVC: 38
FVC_LLN: 3.84
FEV1/FVC_PERCENT_PREDICTED: 47
FVC: 3.8
FEV1: 1.38
FVC_PREDICTED: 4.6
FEV1_PERCENT_PREDICTED: 39
FVC_PERCENT_PREDICTED: 78
FEV1: 1.35
FEV1/FVC: 38.02
FEV1_PERCENT_CHANGE: 2
FEV1_LLN: 2.83
FEV1/FVC_PERCENT_PREDICTED: 48
FEV1_PREDICTED: 3.39
FVC: 3.63
FEV1/FVC_PERCENT_PREDICTED: 51
FEV1_PERCENT_PREDICTED: 40
FEV1/FVC_PERCENT_PREDICTED: 51
FEV1/FVC: 36
FEV1/FVC_PERCENT_PREDICTED: 74
FEV1_PERCENT_CHANGE: -4

## 2019-03-25 NOTE — PROGRESS NOTES
CC:  Here for f/u sleep and pulmonary issues as listed below    HPI:   Jigar presents today for follow up for emphysema with PFT results.       PFTs from 3/2019 is stable from 2017 and indicate a Fev1 of 1.35L or 39% predicted with a mild bronchodilator response, Fev1/FVC ratio of 36, DLCO 48%.  He is a former smoker of 45 pack year history, quit 2014. Had CAT scan performed September 1, 2015 with demonstrates severe emphysema.  Patient has a history of a aneurysm and encouraged to follow-up with cardiology, but again has not followed up in the last year.   Cat scan completed 4/24/2018 did not show mass or consolidation. Again emphysema and scattered areas of areas of peripheral fibrosis. Referred to lung cancer screening, but declines referral to the program.      Patient is compliant using Symbicort 160-4.5, 2 puffs twice a day with mouth rinse.  He stopped using Incruse, because the price went up. has been using his rescue inhaler appx 1-2x/day.  Dyspnea is same since last OV, but admits there are times he has abrupt dypsnea, typically due to stress.  He also admits he has not been using his oxygen during the day although he has qualified for 2 L of oxygen with exertion. He has been using 2 L of oxygen at night.     He required emergency antibiotic and medrol pack with benefit appx 1 months ago. Chronic cough that is dry.  He denies wheeze, chest pain or chest tightness, fever and chills. He stopped lifting weights, but has been doing daily bicycle. Admits he has not been working out as often, losing motivation.     HSS from 7/2017 indicated an AHI of 3.2 and low oxygen of 82% with time below 88% of 5 hours and 49 minutes.       Patient Active Problem List    Diagnosis Date Noted   • Hypoxia 03/21/2018   • Nocturnal oxygen desaturation 07/20/2017   • Need for Zostavax administration 10/09/2015   • Herpes labialis 10/09/2015   • Aortic aneurysm (HCC) 11/04/2014   • Cluster headaches 09/22/2014   • Neck mass  09/22/2014   • COPD (chronic obstructive pulmonary disease) ([facility]) 09/22/2014       Past Medical History:   Diagnosis Date   • Aortic aneurysm ([facility])    • Bronchitis    • Chronic airway obstruction, not elsewhere classified    • COPD (chronic obstructive pulmonary disease) ([facility])        Past Surgical History:   Procedure Laterality Date   • KNEE ARTHROSCOPY     • KNEE ORIF         Family History   Problem Relation Age of Onset   • Cancer Mother         Type unspec.   • Cancer Father         Type unspec.       Social History     Social History   • Marital status:      Spouse name: N/A   • Number of children: N/A   • Years of education: N/A     Occupational History   • Not on file.     Social History Main Topics   • Smoking status: Former Smoker     Packs/day: 1.00     Years: 50.00     Types: Cigarettes     Quit date: 7/20/2014   • Smokeless tobacco: Never Used   • Alcohol use No   • Drug use: No   • Sexual activity: Yes     Partners: Female      Comment: Off and on     Other Topics Concern   • Not on file     Social History Narrative   • No narrative on file       Current Outpatient Prescriptions   Medication Sig Dispense Refill   • budesonide-formoterol (SYMBICORT) 160-4.5 MCG/ACT Aerosol Inhale 2 Puffs by mouth 2 Times a Day.     • albuterol (VENTOLIN HFA) 108 (90 Base) MCG/ACT Aero Soln inhalation aerosol Inhale 2 Puffs by mouth every 6 hours as needed for Shortness of Breath.     • Fluticasone-Umeclidin-Vilant (TRELEGY ELLIPTA) 100-62.5-25 MCG/INH AEROSOL POWDER, BREATH ACTIVATED Inhale 1 Puff by mouth every day. 1 Each 11   • MethylPREDNISolone (MEDROL DOSEPAK) 4 MG Tablet Therapy Pack Take as directed. 21 Tab 0   • azithromycin (ZITHROMAX) 250 MG Tab Take 2 tablets on day 1, then take 1 tablet a day for 4 days. 6 Tab 0   • polyethylene glycol 3350 (MIRALAX) Powder Take 17 g by mouth every day. 527 g 5   • oxycodone immediate release (ROXICODONE) 10 MG immediate release tablet      • aspirin 81 MG tablet  "Take 81 mg by mouth every day.       No current facility-administered medications for this visit.           Allergies: Bactrim ds and Spiriva      ROS   Gen: Denies fever, chills, unintentional weight loss, fatigue, night sweats  E/N/T: Denies ear pain, nasal congestion  Resp:Denies  wheezing, sputum production, hemoptysis  CV: Denies chest pain, chest tightness, palpitations, BLE edema  Sleep:Denies morning headache, insomnia, daytime somnolence, snoring, gasping for air, apnea  Neuro: Denies frequent headaches, weakness, dizziness  GI: Denies N/V, acid reflux/heartburn  See HPI.  All other systems reviewed and negative      Vital signs for this encounter:  Vitals:    03/25/19 1300 03/25/19 1355   Height:  1.803 m (5' 11\")   Weight:  58.1 kg (128 lb)   Weight % change since last entry.:  0 %   BP:  124/66   Pulse:  87   BMI (Calculated):  17.85   Resp:  16   Temp:  37 °C (98.6 °F)   TempSrc:  Temporal   O2 sat % room air: 92 %                  Physical Exam:   Appearance: well developed, well nourished, no acute distress.  Eyes: PERRL, EOM intact, sclere white, conjunctiva moist.  Ears: no lesions or deformities.  Hearing: grossly intact.  Nose: no lesions or deformities.  Dentition: good dentition.  Oropharynx: tongue normal, posterior pharynx without erythema or exudate.  Neck: supple, trachea midline, no masses.  Respiratory effort: no intercostal retractions or use of accessory muscles.  Lung auscultation: Poor air movement  Heart auscultation: no murmur, rub, or gallop.   Extremities: no cyanosis or edema.  Abdomen: soft, non-tender, no masses.  Gait and station: grossly normal   Digits and Nails: no clubbing, cyanosis, petechiae, or nodes.  Cranial nerves: grossly normal.  Motor: no focal deficits observed.  Skin: no rashes, lesions, or ulcers noted.  Orientation: oriented to time, place, and person.  Mood and affect: mood and affect appropriate, normal interaction with examiner.    Assessment   1. Pulmonary " emphysema, unspecified emphysema type (HCC)  REFERRAL TO [facility] (HIP) Services Requested: Pulmonary Rehab; Reason for Visit: COPD/Emphysema   2. Nocturnal oxygen desaturation     3. Hypoxia         Patient was seen for 25 minutes, more than 50% of time spent in face to face review, counseling, and arranging future evaluation and follow up of medical conditions and care related to review of lung disease and natural progression with symptoms.  Importance of lung cancer screening program, although understands and continues to decline at this time due to monetary issues.  Reviewed importance of preventative screenings and measures in regards to finding a primary which again he has not found yet.  He again has not followed up with cardiology although he has abnormal CAT scan with aneurysm that has not been followed recently.  Patient is clinically stable and will proceed with following plan.     PLAN:   Patient Instructions   1) Start Trelegy 1 puff, once a day with mouth rinse.  STOP Symbicort 160/4.5 and Incruse dependent on cost  2) Continue rescue and nebulizer as needed    2) Vaccines: Up to date with Pneumovax 23 and Prevnar 13.  3) Continue using oxygen at 2L nocturnally and with exertion  4) Zpack and Medrol pack for emergency  5) Continue weekly exercise. Referral to pulmonary rehab  6) Astelin nasal spray for rhinitis  8) Find primary to establish care. Encourage to f/u with cardiology with hx of aortic anuerysm and bloating.   9) Continue smoking cessation   10) Return in about 6 months (around 9/25/2019) for follow up with LU Voss, if not sooner, review of symptoms.

## 2019-03-25 NOTE — PROCEDURES
Good patient effort & cooperation.  The results of this test meet the ATS/ERS standards for acceptability and repeatability.  Predicted equations for Spirometry are N-Tami II, ITS for lung volumes, and [facility] for DLCO.  The DLCO was uncorrected for Hgb.  A bronchodilator of Ventolin HFA- 2puffs via spacer were administered.  DLCO was performed during dilation period.    1. Baseline spirometry demonstrates a severe reduction in FEV1. FEV1/FVC ratio is reduced.  FEV1 is 1.35 L which is 39% of predicted    2. After administration of an inhaled bronchodilator there is no significant improvement in FEV1.    3. Lung volumes demonstrate severe hyperinflation.    4. Gas exchange as estimated by DLCO is severely reduced at 48% of predicted.    5. Airway resistance is increased.      Impression:    This study demonstrates the presence of severe obstructive lung disease.  No reversibility is noted on the study.

## 2019-03-25 NOTE — PROGRESS NOTES
CC:  Here for f/u pulmonary issues as listed below    HPI:   Jigar presents today for follow up for emphysema.       PFTs from 9/2017 have declined since 2015 indicate a Fev1 of 1.23L or 35% predicted with a mild bronchodilator response, Fev1/FVC ratio of 33, DLCO 48%.   He is a smoker of 45 pack years who quit almost 2 years ago. Had CAT scan performed September 1, 2015 with no evidence of thrombosis however demonstrates severe emphysema.  Patient has a history of a aneurysm and encouraged to follow-up with cardiology, but has not followed up in the last year.  Referred to lung cancer screening, but declines referral to the program.  Cat scan completed 4/24/2018 did not show mass or consolidation. Again emphysema and scattered areas of areas of peripheral fibrosis.      Patient is compliant using Symbicort 160-4.5, 2 puffs twice a day with mouth rinse, Incruse daily, has been using his rescue inhaler appx 1-2x/day.  Dyspnea is same since last OV.  He completed 39% of 6 minute walk today on room air at rest he was 92%.  He desaturated to a low of 86% with exertion and placed on 2 L of oxygen.  With exertion he went to a low of 91%.  He will continue to benefit from exertional oxygen.  He admits he has not been using it, because he was not provided proper equipment.  He required emergency antibiotic and medrol pack with benefit appx 2 months ago. Chronic cough resolved.  He reported last office visit that he has bloating that comes and goes, but does not have a primary to refer to GI. He denies wheeze, chest pain or chest tightness, fever and chills. He works out weekly lifting weights or daily bicycle.      HSS from 7/2017 indicated an AHI of 3.2 and low oxygen of 82% with time below 88% of 5 hours and 49 minutes.  He required an overnight oximetry which showed time below oxygenation of 88% of 47 minutes. He has been using 2 L of oxygen at night. Patient is currently sleeping 2-3 hours then he wakes, because of  tinnitus to go bathroom and with 2-3x nighttime awakenings. They sometimes have trouble falling asleep.  They do not feel refreshed in the morning and denies morning H/A. They feel tired throughout the day and tries naps for appx 1 hour long.  Patient reports snoring, apnea events and paroxysmal nocturnal dyspnea events.          Jigar presents today for follow up for ***. PFTs from *** indicate a Fev1 of ***L or ***% predicted with a *** bronchodilator response, Fev1/FVC ratio of ***, DLCO ***%.        Patient Active Problem List    Diagnosis Date Noted   • Hypoxia 03/21/2018   • Nocturnal oxygen desaturation 07/20/2017   • Need for Zostavax administration 10/09/2015   • Herpes labialis 10/09/2015   • Aortic aneurysm (HCC) 11/04/2014   • Unintentional weight loss 09/22/2014   • Cluster headaches 09/22/2014   • Neck mass 09/22/2014   • COPD (chronic obstructive pulmonary disease) ([facility]) 09/22/2014       Past Medical History:   Diagnosis Date   • Aortic aneurysm (HCC)    • Bronchitis    • Chronic airway obstruction, not elsewhere classified    • COPD (chronic obstructive pulmonary disease) ([facility])        Past Surgical History:   Procedure Laterality Date   • KNEE ARTHROSCOPY     • KNEE ORIF         Family History   Problem Relation Age of Onset   • Cancer Mother         Type unspec.   • Cancer Father         Type unspec.       Social History   Substance Use Topics   • Smoking status: Former Smoker     Packs/day: 1.00     Years: 50.00     Types: Cigarettes     Quit date: 7/20/2014   • Smokeless tobacco: Never Used   • Alcohol use No       Current Outpatient Prescriptions   Medication Sig Dispense Refill   • budesonide-formoterol (SYMBICORT) 160-4.5 MCG/ACT Aerosol Inhale 2 Puffs by mouth 2 Times a Day.     • albuterol (VENTOLIN HFA) 108 (90 Base) MCG/ACT Aero Soln inhalation aerosol Inhale 2 Puffs by mouth every 6 hours as needed for Shortness of Breath.     • polyethylene glycol 3350 (MIRALAX) Powder Take 17 g by mouth  "every day. 527 g 5   • umeclidinium-vilanterol (ANORO ELLIPTA) 62.5-25 MCG/INH AEROSOL POWDER, BREATH ACTIVATED inhaler Inhale 1 Puff by mouth every day. (Patient not taking: Reported on 3/25/2019) 1 Each 11   • Fluticasone Furoate (ARNUITY ELLIPTA) 100 MCG/ACT AEROSOL POWDER, BREATH ACTIVATED Inhale 1 Puff by mouth every day. Rinse mouth after each use. (Patient not taking: Reported on 3/25/2019) 1 Each 5   • MethylPREDNISolone (MEDROL DOSEPAK) 4 MG Tablet Therapy Pack Take as directed. (Patient not taking: Reported on 3/25/2019) 21 Tab 0   • azithromycin (ZITHROMAX) 250 MG Tab Take 2 tablets on day 1, then take 1 tablet a day for 4 days. (Patient not taking: Reported on 3/25/2019) 6 Tab 0   • MethylPREDNISolone (MEDROL DOSEPAK) 4 MG Tablet Therapy Pack Take as directed. (Patient not taking: Reported on 5/11/2018) 21 Tab 0   • azithromycin (ZITHROMAX) 250 MG Tab Take 2 tablets on day 1, then take 1 tablet a day for 4 days. (Patient not taking: Reported on 5/11/2018) 6 Tab 0   • oxycodone immediate release (ROXICODONE) 10 MG immediate release tablet      • aspirin 81 MG tablet Take 81 mg by mouth every day.     • Nutritional Supplements (ENSURE NUTRITION SHAKE) LIQD Take 1 Bottle by mouth every day. (Patient not taking: Reported on 3/25/2019) 24 Bottle 12     No current facility-administered medications for this visit.           Allergies: Bactrim ds and Spiriva          ROS ***  Gen: Denies fever, chills, unintentional weight loss, fatigue, night sweats  E/N/T: Denies nasal congestion, ear pain  Resp:Denies Dyspnea, wheezing, cough, sputum production, hemoptysis  CV: Denies chest pain, chest tightness, palpitations  Sleep:Denies morning headache  Neuro: Denies frequent headaches, weakness, dizziness  GI: Denies acid reflux, N/V  See HPI.  All other systems reviewed and negative          Vital signs for this encounter:  Vitals:    03/25/19 1300 03/25/19 1355   Height:  1.803 m (5' 11\")   Weight:  58.1 kg (128 lb) "   Weight % change since last entry.:  0 %   BP:  124/66   Pulse:  87   BMI (Calculated):  17.85   Resp:  16   Temp:  37 °C (98.6 °F)   TempSrc:  Temporal   O2 sat % room air: 92 %                  Physical Exam:   Appearance: well developed, well nourished, no acute distress. ***  Eyes: PERRL, EOM intact, sclere white, conjunctiva moist.  Ears: no lesions or deformities.  Hearing: grossly intact.  Nose: no lesions or deformities.  Dentition: good dentition.   Oropharynx: tongue normal, posterior pharynx without erythema or exudate.  Neck: supple, trachea midline, no masses.  Respiratory effort: no intercostal retractions or use of accessory muscles.  Lung auscultation: Bilateral diminished ***  Heart auscultation: no murmur, rub, or gallop ***  Extremities: no cyanosis or edema.  Abdomen: soft, non-tender, no masses.  Gait and station: without difficulty ***  Digits and Nails: no clubbing, cyanosis, petechiae, or nodes.  Cranial nerves: grossly normal.  Motor: no focal deficits observed.   Skin: no rashes, lesions, or ulcers noted.  Orientation: oriented to time, place, and person.  Mood and affect: mood and affect appropriate, normal interaction with examiner.      Assessment   No diagnosis found.    Patient was seen for *** minutes, more than 50% of time spent in face to face review, counseling, and arranging future evaluation and follow up of medical conditions and care relating to ***. Patient is clinically *** and will have the following changes per plan.     PLAN:   ***

## 2019-03-25 NOTE — PATIENT INSTRUCTIONS
1) Start Trelegy 1 puff, once a day with mouth rinse.  STOP Symbicort 160/4.5 and Incruse dependent on cost  2) Continue rescue and nebulizer as needed    2) Vaccines: Up to date with Pneumovax 23 and Prevnar 13.  3) Continue using oxygen at 2L nocturnally and with exertion  4) Zpack and Medrol pack for emergency  5) Continue weekly exercise. Referral to pulmonary rehab  6) Astelin nasal spray for rhinitis  8) Find primary to establish care. Encourage to f/u with cardiology with hx of aortic anuerysm and bloating.   9) Continue smoking cessation   10) Return in about 6 months (around 9/25/2019) for follow up with LU Voss, if not sooner, review of symptoms.

## 2019-03-27 ENCOUNTER — TELEPHONE (OUTPATIENT)
Dept: PULMONOLOGY | Facility: HOSPICE | Age: 69
End: 2019-03-27

## 2019-03-27 NOTE — TELEPHONE ENCOUNTER
Keep him on Symbicort then.  Encourage him to fill the Incruse.      Also inform him if the Symbicort is too much, I can replace with Breo

## 2019-03-27 NOTE — TELEPHONE ENCOUNTER
DOCUMENTATION OF PRIOR AUTH STATUS    1. Medication name and dose: Trelegy Ellipta 100/62.5/25 mcg    2. Name and Phone # of Prescription coverage company: [employer] [phone]    3. Date Prior Auth was submitted: 3/27/2019    4. What information was given to obtain insurance decision: Clinical notes    5. Prior Auth letter Approved or Denied: Denied    6. Pharmacy notified: No    7. Patient notified: No        Trelegy Ellipta was denied.  I just put in for a sample for him from [facility].  The patient needs to try 2 of the following first:  Bevespi, Breo, Serevent, Stiolto.  DX is COPD.  Please advise, thank you.

## 2019-03-27 NOTE — TELEPHONE ENCOUNTER
MEDICATION PRIOR AUTHORIZATION NEEDED:    1. Name of Medication: Trelegy Ellipta 100-62.5-25 mcg    2. Requested By (Name of Pharmacy): [facility]     3. Is insurance on file current? yes    4. What is the name & phone number of the 3rd party payor? [employer] [phone]

## 2019-03-28 NOTE — TELEPHONE ENCOUNTER
[facility] the [facility] know what Iman Culver said regarding his medication.

## 2019-04-15 DIAGNOSIS — J44.9 CHRONIC OBSTRUCTIVE PULMONARY DISEASE, UNSPECIFIED COPD TYPE (HCC): ICD-10-CM

## 2019-04-15 NOTE — TELEPHONE ENCOUNTER
Have we ever prescribed this med? Yes.  If yes, what date? 03/25/19 Florecita APRN    Last OV: 03/25/19 Florecita LEIGH     Next OV: 09/23/19 Florecita LEIGH     DX: COPD    Medications: budesonide-formoterol (SYMBICORT) 160-4.5 MCG/ACT Aerosol      Request sent via Fax by [facility]

## 2019-04-16 RX ORDER — BUDESONIDE AND FORMOTEROL FUMARATE DIHYDRATE 160; 4.5 UG/1; UG/1
AEROSOL RESPIRATORY (INHALATION)
Qty: 1 INHALER | Refills: 4 | Status: SHIPPED | OUTPATIENT
Start: 2019-04-16 | End: 2019-05-22 | Stop reason: SDUPTHER

## 2019-04-16 NOTE — TELEPHONE ENCOUNTER
Trelegy denied by insurance     Have we ever prescribed this med? Yes.  If yes, what date? 10/24/18    Last OV:03/25/19 Florecita LEIGH     Next OV: 09/23/19 Florecita LEIGH    DX: COPD    Medications: SYMBICORT 160-4.5 MCG/ACT Aerosol

## 2019-04-17 RX ORDER — BUDESONIDE AND FORMOTEROL FUMARATE DIHYDRATE 160; 4.5 UG/1; UG/1
2 AEROSOL RESPIRATORY (INHALATION) 2 TIMES DAILY
Qty: 1 INHALER | Refills: 11 | Status: SHIPPED | OUTPATIENT
Start: 2019-04-17 | End: 2019-09-25 | Stop reason: SDUPTHER

## 2019-05-13 RX ORDER — POLYETHYLENE GLYCOL 3350 17 G/17G
POWDER, FOR SOLUTION ORAL
Qty: 527 G | Refills: 0 | Status: SHIPPED | OUTPATIENT
Start: 2019-05-13

## 2019-05-13 NOTE — TELEPHONE ENCOUNTER
Have we ever prescribed this med? Yes.  If yes, what date? 05/11/2018    Last OV: 03/25/2019 - Iman Culver    Next OV: 09/23/2019 - Iman Culver    DX:     Medications: Miralax

## 2019-05-16 DIAGNOSIS — J44.9 CHRONIC OBSTRUCTIVE PULMONARY DISEASE, UNSPECIFIED COPD TYPE (HCC): ICD-10-CM

## 2019-05-16 NOTE — TELEPHONE ENCOUNTER
Have we ever prescribed this med? Yes.  If yes, what date? 4/17/19    Last OV: 03/25/19 Florecita LEIGH     Next OV: 09/23/19 Florecita LEIGH    DX: COPD    Medications: albuterol (VENTOLIN HFA) 108 (90 Base) MCG/ACT Aero Soln inhalation aerosol    Pharmacy also requested Symbicort. Pt was prescribed   Fluticasone-Umeclidin-Vilant (TRELEGY ELLIPTA) 100-62.5-25 MCG/INH AEROSOL POWDER, BREATH ACTIVATED

## 2019-05-17 RX ORDER — ALBUTEROL SULFATE 90 UG/1
2 AEROSOL, METERED RESPIRATORY (INHALATION) EVERY 6 HOURS PRN
Qty: 3 INHALER | Refills: 1 | Status: SHIPPED | OUTPATIENT
Start: 2019-05-17 | End: 2019-06-21 | Stop reason: SDUPTHER

## 2019-05-22 RX ORDER — BUDESONIDE AND FORMOTEROL FUMARATE DIHYDRATE 160; 4.5 UG/1; UG/1
2 AEROSOL RESPIRATORY (INHALATION) 2 TIMES DAILY
Qty: 3 INHALER | Refills: 3 | Status: SHIPPED | OUTPATIENT
Start: 2019-05-22 | End: 2019-09-11 | Stop reason: SDUPTHER

## 2019-05-22 NOTE — TELEPHONE ENCOUNTER
Have we ever prescribed this med? Yes.  If yes, what date? 4/16/19, pt needs 90 day to Mail order     Last OV: 3/25/19    Next OV: 9/23/19    DX: Chronic obstructive pulmonary disease, unspecified COPD type (HCC) (J44.9)    Medications: SYMBICORT 160-4.5 MCG/ACT Aerosol

## 2019-06-21 DIAGNOSIS — J44.9 CHRONIC OBSTRUCTIVE PULMONARY DISEASE, UNSPECIFIED COPD TYPE (HCC): ICD-10-CM

## 2019-06-21 RX ORDER — ALBUTEROL SULFATE 90 UG/1
AEROSOL, METERED RESPIRATORY (INHALATION)
Qty: 18 G | Refills: 2 | Status: SHIPPED | OUTPATIENT
Start: 2019-06-21 | End: 2019-09-11 | Stop reason: SDUPTHER

## 2019-06-21 NOTE — TELEPHONE ENCOUNTER
Have we ever prescribed this med? Yes.  If yes, what date? 05/17/2019    Last OV: 03/25/2019-ANILA Culver     Next OV: 09/23/2019-ANILA Culver    DX: Chronic obstructive pulmonary disease, unspecified COPD type (HCC) (J44.9)    Medications:   Requested Prescriptions     Pending Prescriptions Disp Refills   • VENTOLIN  (90 Base) MCG/ACT Aero Soln inhalation aerosol [Pharmacy Med Name: VENTOLIN HFA        AER GLAX] 18 g 4     Sig: INHALE 2 PUFFS BY MOUTH EVERY 4 HOURS AS NEEDED FOR SHORTNESS OF BREATH FOR UP TO 30 DAYS.     Patient would like ventolin sent to [facility] pharmacy

## 2019-09-11 DIAGNOSIS — J44.9 CHRONIC OBSTRUCTIVE PULMONARY DISEASE, UNSPECIFIED COPD TYPE (HCC): ICD-10-CM

## 2019-09-11 RX ORDER — BUDESONIDE AND FORMOTEROL FUMARATE DIHYDRATE 160; 4.5 UG/1; UG/1
2 AEROSOL RESPIRATORY (INHALATION) 2 TIMES DAILY
Qty: 1 INHALER | Refills: 11 | Status: SHIPPED | OUTPATIENT
Start: 2019-09-11 | End: 2020-04-06 | Stop reason: SDUPTHER

## 2019-09-11 RX ORDER — ALBUTEROL SULFATE 90 UG/1
2 AEROSOL, METERED RESPIRATORY (INHALATION) EVERY 4 HOURS PRN
Qty: 1 INHALER | Refills: 2 | Status: SHIPPED | OUTPATIENT
Start: 2019-09-11 | End: 2019-11-25 | Stop reason: SDUPTHER

## 2019-09-11 NOTE — TELEPHONE ENCOUNTER
Patient calling he will be out of hs Symbicort and ventolin inhalers by the week end. Pending appointment with Iman CRUZ 9/23/19. Please authorize medications. Request sent to provider. Refill to be sent to [facility] pharmacy on file.

## 2019-09-23 ENCOUNTER — OFFICE VISIT (OUTPATIENT)
Dept: PULMONOLOGY | Facility: HOSPICE | Age: 69
End: 2019-09-23
Payer: MEDICARE

## 2019-09-23 VITALS
DIASTOLIC BLOOD PRESSURE: 66 MMHG | HEART RATE: 97 BPM | WEIGHT: 124 LBS | HEIGHT: 71 IN | BODY MASS INDEX: 17.36 KG/M2 | SYSTOLIC BLOOD PRESSURE: 133 MMHG | OXYGEN SATURATION: 93 %

## 2019-09-23 DIAGNOSIS — J44.9 CHRONIC OBSTRUCTIVE PULMONARY DISEASE, UNSPECIFIED COPD TYPE (HCC): ICD-10-CM

## 2019-09-23 DIAGNOSIS — Z23 NEED FOR VACCINATION: ICD-10-CM

## 2019-09-23 DIAGNOSIS — I71.9 AORTIC ANEURYSM WITHOUT RUPTURE, UNSPECIFIED PORTION OF AORTA (HCC): ICD-10-CM

## 2019-09-23 DIAGNOSIS — R09.02 HYPOXIA: ICD-10-CM

## 2019-09-23 PROCEDURE — 90662 IIV NO PRSV INCREASED AG IM: CPT | Performed by: NURSE PRACTITIONER

## 2019-09-23 PROCEDURE — 99214 OFFICE O/P EST MOD 30 MIN: CPT | Mod: 25 | Performed by: NURSE PRACTITIONER

## 2019-09-23 PROCEDURE — G0008 ADMIN INFLUENZA VIRUS VAC: HCPCS | Performed by: NURSE PRACTITIONER

## 2019-09-23 PROCEDURE — 94761 N-INVAS EAR/PLS OXIMETRY MLT: CPT | Performed by: NURSE PRACTITIONER

## 2019-09-23 PROCEDURE — 99999 AMB MULTIPLE OXIMETRY: CPT | Performed by: NURSE PRACTITIONER

## 2019-09-23 RX ORDER — OMEPRAZOLE 20 MG/1
20 CAPSULE, DELAYED RELEASE ORAL DAILY
Qty: 30 CAP | Refills: 11 | Status: ON HOLD
Start: 2019-09-23 | End: 2020-03-11

## 2019-09-23 RX ORDER — IPRATROPIUM BROMIDE AND ALBUTEROL SULFATE 2.5; .5 MG/3ML; MG/3ML
3 SOLUTION RESPIRATORY (INHALATION) EVERY 4 HOURS PRN
Qty: 120 VIAL | Refills: 5 | Status: SHIPPED | OUTPATIENT
Start: 2019-09-23 | End: 2020-09-29 | Stop reason: SDUPTHER

## 2019-09-23 NOTE — PROGRESS NOTES
CC:  Here for f/u pulmonary issues as listed below    HPI:   Jigar presents today for follow up for emphysema.       PFTs from 3/2019 is stable from 2017 and indicate a Fev1 of 1.35L or 39% predicted with a mild bronchodilator response, Fev1/FVC ratio of 36, DLCO 48%.  He is a former smoker of 45 pack year history, quit 2014. Had CAT scan performed September 1, 2015 with demonstrates severe emphysema.  Patient has a history of a aneurysm and encouraged to follow-up with cardiology, but again has not followed up in the last year.   Cat scan completed 4/24/2018 Again emphysema and scattered areas of areas of peripheral fibrosis. Referred to lung cancer screening, but declines referral to the program.      Patient is compliant using Symbicort 160-4.5, 2 puffs twice a day with mouth rinse.  He stopped using Incruse, because the price went up. has been using his rescue inhaler appx 1-2x/day. Using Duoneb as needed every few weeks.  He wants to purchase an antigen concentrator, but Multi-ox today showed he did not not qualify for daytime oxygen.  He will continue to benefit low from 2 L of oxygen at night. However, the cannula smells and becomes intolerant of using the machine.      Dyspnea is same since last OV, but admits there are times he has abrupt dypsnea.  He has good days and bad days with unknown triggers.   He required emergency antibiotic and medrol pack Jan 2019. Chronic cough that is dry.  He complains of chest pressure after he eats dinner every night.  He uses his rescue inhaler and eventually it goes away. He denies wheeze,  fever and chills. He stopped lifting weights, but has been doing daily bicycle. Admits he has not been working out as often, losing motivation.  His wife is chronically ill and taking care of her at home.  He is unable to complete pulmonary rehab due to this.     HSS from 7/2017 indicated an AHI of 3.2 and low oxygen of 82% with time below 88% of 5 hours and 49 minutes.          Patient Active Problem List    Diagnosis Date Noted   • Hypoxia 2018   • Nocturnal oxygen desaturation 2017   • Need for Zostavax administration 10/09/2015   • Herpes labialis 10/09/2015   • Aortic aneurysm (HCC) 2014   • Cluster headaches 2014   • Neck mass 2014   • COPD (chronic obstructive pulmonary disease) ([facility]) 2014       Past Medical History:   Diagnosis Date   • Aortic aneurysm (HCC)    • Bronchitis    • Chronic airway obstruction, not elsewhere classified    • COPD (chronic obstructive pulmonary disease) ([facility])        Past Surgical History:   Procedure Laterality Date   • KNEE ARTHROSCOPY     • KNEE ORIF         Family History   Problem Relation Age of Onset   • Cancer Mother         Type unspec.   • Cancer Father         Type unspec.       Social History     Tobacco Use   • Smoking status: Former Smoker     Packs/day: 1.00     Years: 50.00     Pack years: 50.00     Types: Cigarettes     Last attempt to quit: 2014     Years since quittin.1   • Smokeless tobacco: Never Used   Substance Use Topics   • Alcohol use: No     Alcohol/week: 0.0 oz   • Drug use: No       Current Outpatient Medications   Medication Sig Dispense Refill   • Umeclidinium Bromide (INCRUSE ELLIPTA) 62.5 MCG/INH AEROSOL POWDER, BREATH ACTIVATED Inhale 1 Puff by mouth every day. 1 month sample 1 Each 11   • ipratropium-albuterol (DUONEB) 0.5-2.5 (3) MG/3ML nebulizer solution 3 mL by Nebulization route every four hours as needed for Shortness of Breath (Wheezing). 120 Vial 5   • omeprazole (PRILOSEC) 20 MG delayed-release capsule Take 1 Cap by mouth every day. 30 Cap 11   • albuterol (VENTOLIN HFA) 108 (90 Base) MCG/ACT Aero Soln inhalation aerosol Inhale 2 Puffs by mouth every four hours as needed for Shortness of Breath. 1 Inhaler 2   • polyethylene glycol 3350 (MIRALAX) Powder MIX 17G IN LIQUID OF CHOICE AND DRINK BY MOUTH ONCE DAILY AS DIRECTED 527 g 0   • budesonide-formoterol  "(SYMBICORT) 160-4.5 MCG/ACT Aerosol Inhale 2 Puffs by mouth 2 Times a Day. With mouth rinse 1 Inhaler 11   • MethylPREDNISolone (MEDROL DOSEPAK) 4 MG Tablet Therapy Pack Take as directed. 21 Tab 0   • azithromycin (ZITHROMAX) 250 MG Tab Take 2 tablets on day 1, then take 1 tablet a day for 4 days. 6 Tab 0   • oxycodone immediate release (ROXICODONE) 10 MG immediate release tablet      • budesonide-formoterol (SYMBICORT) 160-4.5 MCG/ACT Aerosol Inhale 2 Puffs by mouth 2 Times a Day. 1 Inhaler 11   • aspirin 81 MG tablet Take 81 mg by mouth every day.       No current facility-administered medications for this visit.           Allergies: Bactrim ds and Spiriva          ROS   Gen: Denies fever, chills, unintentional weight loss, fatigue, night sweats  E/N/T: Denies nasal congestion, ear pain  Resp:Denies  hemoptysis  CV: Denies chest pain,  palpitations  Sleep:Denies morning headache  Neuro: Denies frequent headaches, weakness, dizziness  GI: Denies acid reflux, N/V  See HPI.  All other systems reviewed and negative          Vital signs for this encounter:  Vitals:    09/23/19 1331 09/23/19 1334   Height:  1.803 m (5' 11\")   Weight:  56.2 kg (124 lb)   Weight % change since last entry.:  0 %   BP:  133/66   Pulse:  97   BMI (Calculated):  17.29   O2 sat % room air: 93 %                  Physical Exam:   Appearance: well developed, well nourished, no acute distress.   Eyes: PERRL, EOM intact, sclere white, conjunctiva moist.  Ears: no lesions or deformities.  Hearing: grossly intact.  Nose: no lesions or deformities.  Dentition: good dentition.   Oropharynx: tongue normal, posterior pharynx without erythema or exudate.  Neck: supple, trachea midline, no masses.  Respiratory effort: no intercostal retractions or use of accessory muscles.  Lung auscultation: Poor air movement  Heart auscultation: no murmur, rub, or gallop   Extremities: no cyanosis or edema.  Abdomen: soft, non-tender, no masses.  Gait and station: " without difficulty   Digits and Nails: no clubbing, cyanosis, petechiae, or nodes.  Cranial nerves: grossly normal.  Motor: no focal deficits observed.   Skin: no rashes, lesions, or ulcers noted.  Orientation: oriented to time, place, and person.  Mood and affect: mood and affect appropriate, normal interaction with examiner.      Assessment   1. Chronic obstructive pulmonary disease, unspecified COPD type (HCC)  Umeclidinium Bromide (INCRUSE ELLIPTA) 62.5 MCG/INH AEROSOL POWDER, BREATH ACTIVATED    Multiple Oximetry    DME Other    Influenza Vaccine, High Dose (65+ Only)   2. Need for vaccination  Influenza Vaccine, High Dose (65+ Only)   3. Hypoxia         Patient was seen for 25 minutes, more than 50% of time spent in face to face review, counseling, and arranging future evaluation and follow up of medical conditions and care relating to medication management and the importance of adding Incruse.  He does rely on samples.  I highly suspect he is having acid reflux given he has chest pressure after dinner.  Recommend acid reflux precautions and 14-day trial of medication. Patient is clinically stable and will have the following changes per plan.     PLAN:   Patient Instructions   1) Continue Symbicort 160/4.5 and Incruse dependent on cost  2) Continue rescue and nebulizer as needed    2) Vaccines: Up to date with Pneumovax 23 and Prevnar 13, flu  3) Continue using oxygen at 2L nocturnally and with exertion as needed  4) Zpack and Medrol pack for emergency  5) Continue weekly exercise. Referral to pulmonary rehab  6) Astelin nasal spray for rhinitis  8) Find primary to establish care. Encourage to f/u with cardiology with hx of aortic anuerysm and bloating.   9) Continue smoking cessation   10) Acid reflux meds x 14 days for trial period  11) Return in about 4 months (around 1/23/2020).

## 2019-09-23 NOTE — PATIENT INSTRUCTIONS
1) Continue Symbicort 160/4.5 and Incruse dependent on cost  2) Continue rescue and nebulizer as needed    2) Vaccines: Up to date with Pneumovax 23 and Prevnar 13.  3) Continue using oxygen at 2L nocturnally and with exertion  4) Zpack and Medrol pack for emergency  5) Continue weekly exercise. Referral to pulmonary rehab  6) Astelin nasal spray for rhinitis  8) Find primary to establish care. Encourage to f/u with cardiology with hx of aortic anuerysm and bloating.   9) Continue smoking cessation   10) Acid reflux meds x 14 days for trial period  11) Return in about 4 months (around 1/23/2020) for follow up with LU Voss, if not sooner.

## 2019-09-23 NOTE — PROCEDURES
Multi-Ox Readings  Multi Ox #1 Room air   O2 sat % at rest 93   O2 sat % on exertion 91   O2 sat average on exertion 91   Multi Ox #2     O2 sat % at rest     O2 sat % on exertion     O2 sat average on exertion       Oxygen Use 2   Oxygen Frequency With exertion and nocturnal   Duration of need     Is the patient mobile within the home?     CPAP Use?     BIPAP Use?     Servo Titration

## 2019-09-25 DIAGNOSIS — J44.9 CHRONIC OBSTRUCTIVE PULMONARY DISEASE, UNSPECIFIED COPD TYPE (HCC): ICD-10-CM

## 2019-09-25 RX ORDER — BUDESONIDE AND FORMOTEROL FUMARATE DIHYDRATE 160; 4.5 UG/1; UG/1
2 AEROSOL RESPIRATORY (INHALATION) 2 TIMES DAILY
Qty: 2 INHALER | Refills: 0 | Status: SHIPPED | OUTPATIENT
Start: 2019-09-25 | End: 2019-11-08 | Stop reason: SDUPTHER

## 2019-09-25 NOTE — TELEPHONE ENCOUNTER
Patient states that when he was seen on Monday with Florecita LEIGH, Incruse and Symbicort were to be sent to sample pharmacy but they only received Incruse.     Patient would like Symbicort to also be sent to [facility].     Symbicort is Pending

## 2019-09-30 DIAGNOSIS — J43.9 PULMONARY EMPHYSEMA, UNSPECIFIED EMPHYSEMA TYPE (HCC): ICD-10-CM

## 2019-09-30 RX ORDER — AZITHROMYCIN 250 MG/1
TABLET, FILM COATED ORAL
Qty: 6 TAB | Refills: 0 | Status: SHIPPED | OUTPATIENT
Start: 2019-09-30 | End: 2019-12-10

## 2019-09-30 RX ORDER — METHYLPREDNISOLONE 4 MG/1
TABLET ORAL
Qty: 21 TAB | Refills: 0 | Status: SHIPPED | OUTPATIENT
Start: 2019-09-30 | End: 2019-12-10

## 2019-09-30 NOTE — TELEPHONE ENCOUNTER
Have we ever prescribed this med? Yes.  If yes, what date? 03*/25/2019    Last OV: 09/23/2019    Next OV: 01/25/2020    DX: emphysema.      Medications: azithromycin (ZITHROMAX) 250 MG Tab [[id number]]      Have we ever prescribed this med? Yes.  If yes, what date? 03*/25/2019    Last OV: 09/23/2019    Next OV: 01/25/2020    DX: emphysema.      Medications:MethylPREDNISolone (MEDROL DOSEPAK) 4 MG Tablet Therapy Pack [[id number]]      Pt called and said he would like to have this on hand just in case he has a flair up

## 2019-11-08 DIAGNOSIS — J44.9 CHRONIC OBSTRUCTIVE PULMONARY DISEASE, UNSPECIFIED COPD TYPE (HCC): ICD-10-CM

## 2019-11-08 NOTE — TELEPHONE ENCOUNTER
SAMPLE SAMPLE SAMPLE     Have we ever prescribed this med? Yes.  If yes, what date? 09/23/2019    Last OV: 09/23/2019-ANILA Culver    Next OV: 01/24/2020-ANILA Culver     DX: Chronic obstructive pulmonary disease, unspecified COPD type (HCC) (J44.9)    Medications:   Requested Prescriptions     Pending Prescriptions Disp Refills   • Umeclidinium Bromide (INCRUSE ELLIPTA) 62.5 MCG/INH AEROSOL POWDER, BREATH ACTIVATED 1 Each 11     Sig: Inhale 1 Puff by mouth every day. 1 month sample   • budesonide-formoterol (SYMBICORT) 160-4.5 MCG/ACT Aerosol 2 Inhaler 0     Sig: Inhale 2 Puffs by mouth 2 Times a Day. With mouth rinse           SAMPLE SAMPLE SAMPLE

## 2019-11-11 RX ORDER — BUDESONIDE AND FORMOTEROL FUMARATE DIHYDRATE 160; 4.5 UG/1; UG/1
2 AEROSOL RESPIRATORY (INHALATION) 2 TIMES DAILY
Qty: 2 INHALER | Refills: 0 | COMMUNITY
Start: 2019-11-11 | End: 2019-12-11 | Stop reason: SDUPTHER

## 2019-11-11 NOTE — TELEPHONE ENCOUNTER
Dispensed Symbicort samples x2.    Lot#: [id number]  Exp: 11/2020    Provider: ANILA LEIGH    Logged distribution of sample on data sheet.     Dispensed by: Maggy Wyatt         Dispensed Incruse samples x2.    Lot#: [id number]  Exp: 02/2021    Provider: ANILA LEIGH    Logged distribution of sample on data sheet.     Dispensed by: Maggy Wyatt       Pt notified that samples were place up front at the pul clinic ready for .

## 2019-11-24 ENCOUNTER — OFFICE VISIT (OUTPATIENT)
Dept: URGENT CARE | Facility: CLINIC | Age: 69
End: 2019-11-24
Payer: MEDICARE

## 2019-11-24 VITALS
HEIGHT: 72 IN | OXYGEN SATURATION: 91 % | BODY MASS INDEX: 16.12 KG/M2 | WEIGHT: 119 LBS | RESPIRATION RATE: 14 BRPM | DIASTOLIC BLOOD PRESSURE: 90 MMHG | TEMPERATURE: 99.1 F | HEART RATE: 92 BPM | SYSTOLIC BLOOD PRESSURE: 116 MMHG

## 2019-11-24 DIAGNOSIS — I71.40 ABDOMINAL AORTIC ANEURYSM (AAA) WITHOUT RUPTURE (HCC): ICD-10-CM

## 2019-11-24 DIAGNOSIS — R10.84 GENERALIZED ABDOMINAL PAIN: ICD-10-CM

## 2019-11-24 DIAGNOSIS — K40.90 LEFT INGUINAL HERNIA: ICD-10-CM

## 2019-11-24 PROCEDURE — 99214 OFFICE O/P EST MOD 30 MIN: CPT | Performed by: PHYSICIAN ASSISTANT

## 2019-11-24 ASSESSMENT — ENCOUNTER SYMPTOMS
VOMITING: 0
CHILLS: 0
FLANK PAIN: 0
FEVER: 0
ABDOMINAL PAIN: 1
DIARRHEA: 1

## 2019-11-25 ENCOUNTER — HOSPITAL ENCOUNTER (OUTPATIENT)
Dept: LAB | Facility: MEDICAL CENTER | Age: 69
End: 2019-11-25
Attending: PHYSICIAN ASSISTANT
Payer: MEDICARE

## 2019-11-25 ENCOUNTER — TELEPHONE (OUTPATIENT)
Dept: URGENT CARE | Facility: PHYSICIAN GROUP | Age: 69
End: 2019-11-25

## 2019-11-25 ENCOUNTER — HOSPITAL ENCOUNTER (OUTPATIENT)
Dept: RADIOLOGY | Facility: MEDICAL CENTER | Age: 69
End: 2019-11-25
Attending: PHYSICIAN ASSISTANT
Payer: MEDICARE

## 2019-11-25 DIAGNOSIS — R10.84 GENERALIZED ABDOMINAL PAIN: ICD-10-CM

## 2019-11-25 DIAGNOSIS — J44.9 CHRONIC OBSTRUCTIVE PULMONARY DISEASE, UNSPECIFIED COPD TYPE (HCC): ICD-10-CM

## 2019-11-25 LAB
ALBUMIN SERPL BCP-MCNC: 4.3 G/DL (ref 3.2–4.9)
ALBUMIN/GLOB SERPL: 2.3 G/DL
ALP SERPL-CCNC: 57 U/L (ref 30–99)
ALT SERPL-CCNC: 30 U/L (ref 2–50)
ANION GAP SERPL CALC-SCNC: 7 MMOL/L (ref 0–11.9)
AST SERPL-CCNC: 23 U/L (ref 12–45)
BASOPHILS # BLD AUTO: 0.4 % (ref 0–1.8)
BASOPHILS # BLD: 0.03 K/UL (ref 0–0.12)
BILIRUB SERPL-MCNC: 0.7 MG/DL (ref 0.1–1.5)
BUN SERPL-MCNC: 9 MG/DL (ref 8–22)
CALCIUM SERPL-MCNC: 9.3 MG/DL (ref 8.5–10.5)
CHLORIDE SERPL-SCNC: 105 MMOL/L (ref 96–112)
CO2 SERPL-SCNC: 29 MMOL/L (ref 20–33)
CREAT SERPL-MCNC: 0.93 MG/DL (ref 0.5–1.4)
EOSINOPHIL # BLD AUTO: 0.11 K/UL (ref 0–0.51)
EOSINOPHIL NFR BLD: 1.5 % (ref 0–6.9)
ERYTHROCYTE [DISTWIDTH] IN BLOOD BY AUTOMATED COUNT: 44.1 FL (ref 35.9–50)
GLOBULIN SER CALC-MCNC: 1.9 G/DL (ref 1.9–3.5)
GLUCOSE SERPL-MCNC: 86 MG/DL (ref 65–99)
HCT VFR BLD AUTO: 47.7 % (ref 42–52)
HGB BLD-MCNC: 16.3 G/DL (ref 14–18)
IMM GRANULOCYTES # BLD AUTO: 0.03 K/UL (ref 0–0.11)
IMM GRANULOCYTES NFR BLD AUTO: 0.4 % (ref 0–0.9)
LYMPHOCYTES # BLD AUTO: 1.29 K/UL (ref 1–4.8)
LYMPHOCYTES NFR BLD: 17.7 % (ref 22–41)
MCH RBC QN AUTO: 31.5 PG (ref 27–33)
MCHC RBC AUTO-ENTMCNC: 34.2 G/DL (ref 33.7–35.3)
MCV RBC AUTO: 92.1 FL (ref 81.4–97.8)
MONOCYTES # BLD AUTO: 0.57 K/UL (ref 0–0.85)
MONOCYTES NFR BLD AUTO: 7.8 % (ref 0–13.4)
NEUTROPHILS # BLD AUTO: 5.24 K/UL (ref 1.82–7.42)
NEUTROPHILS NFR BLD: 72.2 % (ref 44–72)
NRBC # BLD AUTO: 0.03 K/UL
NRBC BLD-RTO: 0.4 /100 WBC
PLATELET # BLD AUTO: 155 K/UL (ref 164–446)
PMV BLD AUTO: 10.8 FL (ref 9–12.9)
POTASSIUM SERPL-SCNC: 4.7 MMOL/L (ref 3.6–5.5)
PROT SERPL-MCNC: 6.2 G/DL (ref 6–8.2)
RBC # BLD AUTO: 5.18 M/UL (ref 4.7–6.1)
SODIUM SERPL-SCNC: 141 MMOL/L (ref 135–145)
WBC # BLD AUTO: 7.3 K/UL (ref 4.8–10.8)

## 2019-11-25 PROCEDURE — 85025 COMPLETE CBC W/AUTO DIFF WBC: CPT

## 2019-11-25 PROCEDURE — 74177 CT ABD & PELVIS W/CONTRAST: CPT

## 2019-11-25 PROCEDURE — 80053 COMPREHEN METABOLIC PANEL: CPT

## 2019-11-25 PROCEDURE — 700117 HCHG RX CONTRAST REV CODE 255: Performed by: PHYSICIAN ASSISTANT

## 2019-11-25 PROCEDURE — 36415 COLL VENOUS BLD VENIPUNCTURE: CPT

## 2019-11-25 RX ORDER — ALBUTEROL SULFATE 90 UG/1
2 AEROSOL, METERED RESPIRATORY (INHALATION) EVERY 4 HOURS PRN
Qty: 1 INHALER | Refills: 2 | Status: SHIPPED | OUTPATIENT
Start: 2019-11-25 | End: 2020-01-24 | Stop reason: SDUPTHER

## 2019-11-25 RX ADMIN — IOHEXOL 100 ML: 350 INJECTION, SOLUTION INTRAVENOUS at 11:32

## 2019-11-25 RX ADMIN — IOHEXOL 25 ML: 240 INJECTION, SOLUTION INTRATHECAL; INTRAVASCULAR; INTRAVENOUS; ORAL at 11:32

## 2019-11-25 NOTE — PROGRESS NOTES
Subjective:   Jigar Kirkland is a 69 y.o. male who presents today with   Chief Complaint   Patient presents with   • Diarrhea     xtoday, unable to urinate, diarrhea, stomach cramping, bloating after eating, lower back pain,  bump on groin sometimes painful       Diarrhea    This is a new problem. The current episode started yesterday. The problem occurs 2 to 4 times per day. The problem has been gradually improving. The stool consistency is described as watery. The patient states that diarrhea does not awaken him from sleep. Associated symptoms include abdominal pain. Pertinent negatives include no chills, fever or vomiting.     Patient does have hx of AAA which was followed by PCP but patient states his primary care physician retired who was following him.  He states that it was always reported as being stable.  Patient denies any abdominal pain or back pain upon examination today.  PMH:  has a past medical history of Aortic aneurysm (HCC), Bronchitis, Chronic airway obstruction, not elsewhere classified, and COPD (chronic obstructive pulmonary disease) ([facility]).  MEDS:   Current Outpatient Medications:   •  Umeclidinium Bromide (INCRUSE ELLIPTA) 62.5 MCG/INH AEROSOL POWDER, BREATH ACTIVATED, Inhale 1 Puff by mouth every day. 1 month sample, Disp: 1 Each, Rfl: 11  •  budesonide-formoterol (SYMBICORT) 160-4.5 MCG/ACT Aerosol, Inhale 2 Puffs by mouth 2 Times a Day. With mouth rinse, Disp: 2 Inhaler, Rfl: 0  •  ipratropium-albuterol (DUONEB) 0.5-2.5 (3) MG/3ML nebulizer solution, 3 mL by Nebulization route every four hours as needed for Shortness of Breath (Wheezing)., Disp: 120 Vial, Rfl: 5  •  omeprazole (PRILOSEC) 20 MG delayed-release capsule, Take 1 Cap by mouth every day., Disp: 30 Cap, Rfl: 11  •  budesonide-formoterol (SYMBICORT) 160-4.5 MCG/ACT Aerosol, Inhale 2 Puffs by mouth 2 Times a Day., Disp: 1 Inhaler, Rfl: 11  •  albuterol (VENTOLIN HFA) 108 (90 Base) MCG/ACT Aero Soln inhalation aerosol,  Inhale 2 Puffs by mouth every four hours as needed for Shortness of Breath., Disp: 1 Inhaler, Rfl: 2  •  polyethylene glycol 3350 (MIRALAX) Powder, MIX 17G IN LIQUID OF CHOICE AND DRINK BY MOUTH ONCE DAILY AS DIRECTED, Disp: 527 g, Rfl: 0  •  oxycodone immediate release (ROXICODONE) 10 MG immediate release tablet, , Disp: , Rfl:   •  methylPREDNISolone (MEDROL DOSEPAK) 4 MG Tablet Therapy Pack, Take as directed. (Patient not taking: Reported on 11/24/2019), Disp: 21 Tab, Rfl: 0  •  azithromycin (ZITHROMAX) 250 MG Tab, Take 2 tablets on day 1, then take 1 tablet a day for 4 days. (Patient not taking: Reported on 11/24/2019), Disp: 6 Tab, Rfl: 0  •  aspirin 81 MG tablet, Take 81 mg by mouth every day., Disp: , Rfl:   ALLERGIES:   Allergies   Allergen Reactions   • Bactrim Ds Shortness of Breath   • Spiriva      SURGHX:   Past Surgical History:   Procedure Laterality Date   • KNEE ARTHROSCOPY     • KNEE ORIF       SOCHX:  reports that he quit smoking about 5 years ago. His smoking use included cigarettes. He has a 50.00 pack-year smoking history. He has never used smokeless tobacco. He reports that he does not drink alcohol or use drugs.  FH: Reviewed with patient, not pertinent to this visit.       Review of Systems   Constitutional: Negative for chills and fever.   Gastrointestinal: Positive for abdominal pain and diarrhea. Negative for vomiting.   Genitourinary: Positive for frequency and urgency. Negative for dysuria, flank pain and hematuria.   All other systems reviewed and are negative.       Objective:   /90 (BP Location: Left arm, Patient Position: Sitting, BP Cuff Size: Adult)   Pulse 92   Temp 37.3 °C (99.1 °F) (Temporal)   Resp 14   Ht 1.829 m (6')   Wt 54 kg (119 lb)   SpO2 91%   BMI 16.14 kg/m²   Physical Exam  Vitals signs and nursing note reviewed.   Constitutional:       General: He is not in acute distress.     Appearance: He is well-developed.   HENT:      Head: Normocephalic and  atraumatic.      Right Ear: Hearing normal.      Left Ear: Hearing normal.   Eyes:      Pupils: Pupils are equal, round, and reactive to light.   Cardiovascular:      Rate and Rhythm: Normal rate and regular rhythm.      Heart sounds: Normal heart sounds.   Pulmonary:      Effort: Pulmonary effort is normal.   Abdominal:      General: Bowel sounds are normal. There is no distension or abdominal bruit.      Palpations: There is pulsatile mass.      Tenderness: There is no tenderness. There is no guarding or rebound. Negative signs include Bryant's sign, Rovsing's sign and McBurney's sign.      Hernia: A hernia is present. Hernia is present in the left inguinal area.   Musculoskeletal:      Comments: Normal movement in all 4 extremities   Skin:     General: Skin is warm and dry.   Neurological:      Mental Status: He is alert.      Coordination: Coordination normal.   Psychiatric:         Mood and Affect: Mood normal.     UA NEG  CT  IMPRESSION:     1.  Interval increase in size of infrarenal abdominal aortic aneurysm from 4.4 cm in diameter on 10/30/2014 to 5.3 cm currently.  No evidence to indicate aneurysm rupture.  2.  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  3.  No bowel obstruction or focal mesenteric inflammatory process.  4.  Findings concerning for stones or debris within the distal common bile duct, without significant biliary dilation.  Mass is felt less likely.  Minimally prominent pancreatic duct noted.  5.  Appendix is not visualized.  Assessment/Plan:   Assessment    1. Generalized abdominal pain  - CT-ABDOMEN-PELVIS WITH; Future  - CBC WITH DIFFERENTIAL; Future  - Comp Metabolic Panel; Future  - POCT Urinalysis  - REFERRAL TO FOLLOW-UP WITH PRIMARY CARE    2. Left inguinal hernia  - REFERRAL TO GENERAL SURGERY  VSS. Patient will report to ER with any returning pain.   R/O diverticulitis  CT to rule out any diverticulitis  and update on AAA.   Differential diagnosis, natural history, supportive care, and indications for immediate follow-up discussed.   Patient given instructions and understanding of medications and treatment.    If not improving in 3-5 days, F/U with PCP or return to  if symptoms worsen.    Patient agreeable to plan.  11/25/19  Immediately called patient after his CT scan and discussed results with him.  Given his recent increase and aortic aneurysm with his recent symptoms discussed that he should immediately report to the emergency room for further evaluation.  Patient understands and is agreeable to this plan and states he will head to [facility] for further evaluation.    Please note that this dictation was created using voice recognition software. I have made every reasonable attempt to correct obvious errors, but I expect that there are errors of grammar and possibly content that I did not discover before finalizing the note.    Shahriar Francis PA-C

## 2019-11-25 NOTE — TELEPHONE ENCOUNTER
Have we ever prescribed this med? Yes.  If yes, what date? 09/11/19    Last OV: 09/23/19    Next OV: 09/23/19    DX: COPD    Medications: ventolin

## 2019-11-25 NOTE — TELEPHONE ENCOUNTER
Called and discussed results with patient regarding his lab work and CT scan.  Strongly advised and extensively discussed patient should report to the emergency room for further evaluation given his progression of abdominal aortic aneurysm and recent symptom onset.  Patient is understanding and agreeable to this plan he plans on going to [facility] for higher level of care and evaluation.

## 2019-11-27 ENCOUNTER — TELEPHONE (OUTPATIENT)
Dept: VASCULAR LAB | Facility: MEDICAL CENTER | Age: 69
End: 2019-11-27

## 2019-12-02 ENCOUNTER — TELEPHONE (OUTPATIENT)
Dept: URGENT CARE | Facility: CLINIC | Age: 69
End: 2019-12-02

## 2019-12-02 NOTE — TELEPHONE ENCOUNTER
Tried reaching this patient several times through out the week with no answer regarding provider's advice. I left a voicemail on Wednesday with no call back as well. Tried one more time today with no answer.

## 2019-12-03 ENCOUNTER — TELEPHONE (OUTPATIENT)
Dept: VASCULAR LAB | Facility: MEDICAL CENTER | Age: 69
End: 2019-12-03

## 2019-12-03 NOTE — TELEPHONE ENCOUNTER
URGENT- left vm. Schedule initial w/ Bloch on December 10 at 8 AM.  If that does not work please let me know so that I can follow up with Bloch.

## 2019-12-10 ENCOUNTER — OFFICE VISIT (OUTPATIENT)
Dept: VASCULAR LAB | Facility: MEDICAL CENTER | Age: 69
End: 2019-12-10
Attending: INTERNAL MEDICINE
Payer: MEDICARE

## 2019-12-10 VITALS
BODY MASS INDEX: 17.68 KG/M2 | WEIGHT: 126.3 LBS | SYSTOLIC BLOOD PRESSURE: 121 MMHG | HEIGHT: 71 IN | HEART RATE: 84 BPM | DIASTOLIC BLOOD PRESSURE: 74 MMHG

## 2019-12-10 DIAGNOSIS — I71.9 AORTIC ANEURYSM WITHOUT RUPTURE, UNSPECIFIED PORTION OF AORTA (HCC): ICD-10-CM

## 2019-12-10 DIAGNOSIS — R10.9 ABDOMINAL PAIN, UNSPECIFIED ABDOMINAL LOCATION: ICD-10-CM

## 2019-12-10 DIAGNOSIS — K80.50 COMMON BILE DUCT STONE: ICD-10-CM

## 2019-12-10 PROCEDURE — 99204 OFFICE O/P NEW MOD 45 MIN: CPT | Performed by: INTERNAL MEDICINE

## 2019-12-10 PROCEDURE — 99212 OFFICE O/P EST SF 10 MIN: CPT

## 2019-12-10 ASSESSMENT — ENCOUNTER SYMPTOMS
DEPRESSION: 1
SPEECH CHANGE: 0
DIARRHEA: 0
DIZZINESS: 0
NERVOUS/ANXIOUS: 0
HEADACHES: 1
BACK PAIN: 1
MYALGIAS: 0
DOUBLE VISION: 0
FLANK PAIN: 0
NAUSEA: 0
BLOOD IN STOOL: 0
WEIGHT LOSS: 1
BLURRED VISION: 0
VOMITING: 0
SENSORY CHANGE: 0
PALPITATIONS: 0
CONSTIPATION: 1
SHORTNESS OF BREATH: 1
COUGH: 1
ABDOMINAL PAIN: 1
FOCAL WEAKNESS: 0

## 2019-12-10 NOTE — PROGRESS NOTES
INITIAL VASCULAR VISIT  Subjective:   Jigar Kirkland is a 69 y.o. male who presents today 12/10/2019 for   Chief Complaint   Patient presents with   • Follow-Up     HPI:  Patient referred for e/m of aaa.  No previous intervention  Has belly pain in am, particularly after he eats.  No nausea vomiting  No change in bowel habits  Pain is mostly in the lower abdomen  Worse when he bends over  No back pain  Abdominal pain is waxing and waning  Lost about 40-50 pounds in past 5-6 years.   Now stabilized for 2-3 years   Also found to have gall stones.  No known ascvd  Former smoker  Not on lipid lowering.  No bp meds  Most recent ct in nov    Past Medical History:   Diagnosis Date   • Aortic aneurysm (HCC)    • Bronchitis    • Chronic airway obstruction, not elsewhere classified    • COPD (chronic obstructive pulmonary disease) (HCC)      Past Surgical History:   Procedure Laterality Date   • KNEE ARTHROSCOPY     • KNEE ORIF       Family History   Problem Relation Age of Onset   • Cancer Mother         Type unspec.   • Cancer Father         Type unspec.     Social History     Tobacco Use   Smoking Status Former Smoker   • Packs/day: 1.00   • Years: 50.00   • Pack years: 50.00   • Types: Cigarettes   • Last attempt to quit: 2014   • Years since quittin.3   Smokeless Tobacco Never Used     Social History     Tobacco Use   • Smoking status: Former Smoker     Packs/day: 1.00     Years: 50.00     Pack years: 50.00     Types: Cigarettes     Last attempt to quit: 2014     Years since quittin.3   • Smokeless tobacco: Never Used   Substance Use Topics   • Alcohol use: No     Alcohol/week: 0.0 oz   • Drug use: No     Outpatient Encounter Medications as of 12/10/2019   Medication Sig Dispense Refill   • albuterol (VENTOLIN HFA) 108 (90 Base) MCG/ACT Aero Soln inhalation aerosol Inhale 2 Puffs by mouth every four hours as needed for Shortness of Breath. 1 Inhaler 2   • Umeclidinium Bromide (INCRUSE ELLIPTA)  62.5 MCG/INH AEROSOL POWDER, BREATH ACTIVATED Inhale 1 Puff by mouth every day. 1 month sample 1 Each 11   • budesonide-formoterol (SYMBICORT) 160-4.5 MCG/ACT Aerosol Inhale 2 Puffs by mouth 2 Times a Day. With mouth rinse 2 Inhaler 0   • ipratropium-albuterol (DUONEB) 0.5-2.5 (3) MG/3ML nebulizer solution 3 mL by Nebulization route every four hours as needed for Shortness of Breath (Wheezing). 120 Vial 5   • omeprazole (PRILOSEC) 20 MG delayed-release capsule Take 1 Cap by mouth every day. 30 Cap 11   • budesonide-formoterol (SYMBICORT) 160-4.5 MCG/ACT Aerosol Inhale 2 Puffs by mouth 2 Times a Day. 1 Inhaler 11   • polyethylene glycol 3350 (MIRALAX) Powder MIX 17G IN LIQUID OF CHOICE AND DRINK BY MOUTH ONCE DAILY AS DIRECTED 527 g 0   • oxycodone immediate release (ROXICODONE) 10 MG immediate release tablet      • [DISCONTINUED] methylPREDNISolone (MEDROL DOSEPAK) 4 MG Tablet Therapy Pack Take as directed. (Patient not taking: Reported on 11/24/2019) 21 Tab 0   • [DISCONTINUED] azithromycin (ZITHROMAX) 250 MG Tab Take 2 tablets on day 1, then take 1 tablet a day for 4 days. (Patient not taking: Reported on 11/24/2019) 6 Tab 0   • aspirin 81 MG tablet Take 81 mg by mouth every day.       No facility-administered encounter medications on file as of 12/10/2019.      Allergies   Allergen Reactions   • Bactrim Ds Shortness of Breath   • Spiriva      DIET AND EXERCISE:  Weight Change:Lost about 40-50 pounds in past 5-6 years. Now stabilized for 2-3 years   Diet: common adult  Exercise: minimal exercise     Review of Systems   Constitutional: Positive for malaise/fatigue and weight loss.   HENT: Negative for hearing loss and tinnitus.    Eyes: Negative for blurred vision and double vision.   Respiratory: Positive for cough and shortness of breath.    Cardiovascular: Negative for chest pain, palpitations and leg swelling.   Gastrointestinal: Positive for abdominal pain and constipation. Negative for blood in stool,  "diarrhea, melena, nausea and vomiting.   Genitourinary: Negative for flank pain and hematuria.   Musculoskeletal: Positive for back pain and joint pain. Negative for myalgias.   Skin: Negative for itching and rash.   Neurological: Positive for headaches. Negative for dizziness, sensory change, speech change and focal weakness.   Psychiatric/Behavioral: Positive for depression. The patient is not nervous/anxious.       Objective:     Vitals:    12/10/19 0752   BP: 121/74   BP Location: Left arm   Patient Position: Sitting   BP Cuff Size: Adult   Pulse: 84   Weight: 57.3 kg (126 lb 4.8 oz)   Height: 1.8 m (5' 10.87\")      Body mass index is 17.68 kg/m².  Physical Exam  Vitals signs reviewed.   Constitutional:       General: He is not in acute distress.     Appearance: He is well-developed. He is not diaphoretic.      Comments: thin   HENT:      Head: Normocephalic and atraumatic.   Eyes:      General: No scleral icterus.     Conjunctiva/sclera: Conjunctivae normal.      Pupils: Pupils are equal, round, and reactive to light.   Neck:      Musculoskeletal: Normal range of motion and neck supple.      Thyroid: No thyromegaly.      Vascular: No JVD.   Cardiovascular:      Rate and Rhythm: Normal rate and regular rhythm.      Heart sounds: Normal heart sounds. No murmur. No friction rub. No gallop.    Pulmonary:      Effort: No respiratory distress.      Breath sounds: Normal breath sounds. No wheezing or rales.   Abdominal:      General: Bowel sounds are normal. There is distension.      Palpations: Abdomen is soft. There is no mass.      Tenderness: There is no tenderness. There is no rebound.   Musculoskeletal: Normal range of motion.         General: No tenderness.   Skin:     General: Skin is warm and dry.      Findings: No rash.   Neurological:      Mental Status: He is alert and oriented to person, place, and time.      Cranial Nerves: No cranial nerve deficit.      Coordination: Coordination normal.   Psychiatric:  "        Behavior: Behavior normal.                Lab Results   Component Value Date    SODIUM 141 11/25/2019    POTASSIUM 4.7 11/25/2019    CHLORIDE 105 11/25/2019    CO2 29 11/25/2019    GLUCOSE 86 11/25/2019    BUN 9 11/25/2019    CREATININE 0.93 11/25/2019    IFAFRICA >60 11/25/2019    IFNOTAFR >60 11/25/2019        Lab Results   Component Value Date    WBC 7.3 11/25/2019    RBC 5.18 11/25/2019    HEMOGLOBIN 16.3 11/25/2019    HEMATOCRIT 47.7 11/25/2019    MCV 92.1 11/25/2019    MCH 31.5 11/25/2019    MCHC 34.2 11/25/2019    MPV 10.8 11/25/2019      CT abdomen and pelvis November 2019  1.  Interval increase in size of infrarenal abdominal aortic aneurysm from 4.4 cm in diameter on 10/30/2014 to 5.3 cm currently.  No evidence to indicate aneurysm rupture.  2.  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  3.  No bowel obstruction or focal mesenteric inflammatory process.  4.  Findings concerning for stones or debris within the distal common bile duct, without significant biliary dilation.  Mass is felt less likely.  Minimally prominent pancreatic duct noted.  5.  Appendix is not visualized.    Medical Decision Making:  Today's Assessment / Status / Plan:     1. Aortic aneurysm without rupture, unspecified portion of aorta (HCC)  REFERRAL TO VASCULAR SURGERY    REFERRAL TO GASTROENTEROLOGY   2. Abdominal pain, unspecified abdominal location  REFERRAL TO VASCULAR SURGERY    REFERRAL TO GASTROENTEROLOGY    Comp Metabolic Panel    Lipid Profile    CBC, NO DIFFERENTIAL/PLATELET    TSH   3. Common bile duct stone  REFERRAL TO VASCULAR SURGERY    REFERRAL TO GASTROENTEROLOGY     Patient Type: Secondary Prevention    Etiology of Established CVD if Present: AAA    Lipid Management: Qualifies for Statin Therapy Based on 2013 ACC/AHA Guidelines: yes  Calculated 10-Year Risk of ASCVD: N/A  Currently on Statin: No  -Check lipid panel  -Consider statin  based upon results    Blood Pressure Management:  ACC-AHA blood pressure goal less than 130/80  No known history of hypertension  Blood pressure below goal in office  -Continue to follow blood pressure in the office  -Consider anti-potential therapy only if above 130/80 on average    Glycemic Status: Normal  -Check fasting glucose    Anti-Platelet/Anti-Coagulant Tx: Restart aspirin    Smoking: Continue complete avoidance    Physical Activity: Try to do some walking every day    Weight Management and Nutrition: Per GI recommendations    Other:     1. AAA -without previous intervention.  Has grown from 4.4 to 5.3 cm over the past 5 years.  It is very difficult to determine whether or not it is symptomatic but certainly some of his symptoms, particular the pain when bending over could be due to his aneurysm.  Symptoms though are not acute but have been going on for some time  -Medical management as above  -Will refer to vascular surgery for their recommendation as to whether or not to consider intervention at this time  -Assuming no intervention will repeat a CTA in 6 months    2.  Common bile duct stone -unclear whether or not this is responsible for his belly pain.  Symptoms certainly not classic for stone disease.  -Await vascular surgery recommendations as above  -We will also refer to GI for further recommendations    3.  Unintentional weight loss -has now stabilized.  -Await GI recommendations    We will need to reestablish with PCP in the future  We will partner with other providers in the management of established vascular disease and cardiometabolic risk factors.    Studies to Be Obtained: CTA thoracoabdominal aorta in May unless intervention taken prior to this  Labs to Be Obtained: As above prior to next visit    Follow up in: 6 weeks    Michael J Bloch, M.D.     Cc:  [facility]  Shahriar Flynn

## 2019-12-10 NOTE — PATIENT INSTRUCTIONS
We will refer you to   1) [facility]  2) [facility]   - Call us if you don't hear from them within about 10 days.    Get fasting blood work done (attached)    Restart baby aspirin (81 mg daily)    Follow Up:  Feb 4 at 1140a    Michael Bloch, MD  [facility]  [phone]

## 2019-12-11 ENCOUNTER — HOSPITAL ENCOUNTER (OUTPATIENT)
Dept: LAB | Facility: MEDICAL CENTER | Age: 69
End: 2019-12-11
Attending: INTERNAL MEDICINE
Payer: MEDICARE

## 2019-12-11 DIAGNOSIS — R10.9 ABDOMINAL PAIN, UNSPECIFIED ABDOMINAL LOCATION: ICD-10-CM

## 2019-12-11 DIAGNOSIS — J44.9 CHRONIC OBSTRUCTIVE PULMONARY DISEASE, UNSPECIFIED COPD TYPE (HCC): ICD-10-CM

## 2019-12-11 LAB
ALBUMIN SERPL BCP-MCNC: 4.1 G/DL (ref 3.2–4.9)
ALBUMIN/GLOB SERPL: 1.8 G/DL
ALP SERPL-CCNC: 65 U/L (ref 30–99)
ALT SERPL-CCNC: 24 U/L (ref 2–50)
ANION GAP SERPL CALC-SCNC: 9 MMOL/L (ref 0–11.9)
AST SERPL-CCNC: 20 U/L (ref 12–45)
BILIRUB SERPL-MCNC: 0.5 MG/DL (ref 0.1–1.5)
BUN SERPL-MCNC: 9 MG/DL (ref 8–22)
CALCIUM SERPL-MCNC: 9.6 MG/DL (ref 8.5–10.5)
CHLORIDE SERPL-SCNC: 103 MMOL/L (ref 96–112)
CHOLEST SERPL-MCNC: 157 MG/DL (ref 100–199)
CO2 SERPL-SCNC: 29 MMOL/L (ref 20–33)
CREAT SERPL-MCNC: 0.81 MG/DL (ref 0.5–1.4)
ERYTHROCYTE [DISTWIDTH] IN BLOOD BY AUTOMATED COUNT: 43.7 FL (ref 35.9–50)
GLOBULIN SER CALC-MCNC: 2.3 G/DL (ref 1.9–3.5)
GLUCOSE SERPL-MCNC: 85 MG/DL (ref 65–99)
HCT VFR BLD AUTO: 47.8 % (ref 42–52)
HDLC SERPL-MCNC: 48 MG/DL
HGB BLD-MCNC: 16 G/DL (ref 14–18)
LDLC SERPL CALC-MCNC: 96 MG/DL
MCH RBC QN AUTO: 31 PG (ref 27–33)
MCHC RBC AUTO-ENTMCNC: 33.5 G/DL (ref 33.7–35.3)
MCV RBC AUTO: 92.6 FL (ref 81.4–97.8)
POTASSIUM SERPL-SCNC: 4.2 MMOL/L (ref 3.6–5.5)
PROT SERPL-MCNC: 6.4 G/DL (ref 6–8.2)
RBC # BLD AUTO: 5.16 M/UL (ref 4.7–6.1)
SODIUM SERPL-SCNC: 141 MMOL/L (ref 135–145)
TRIGL SERPL-MCNC: 66 MG/DL (ref 0–149)
TSH SERPL DL<=0.005 MIU/L-ACNC: 2.89 UIU/ML (ref 0.38–5.33)
WBC # BLD AUTO: 6.5 K/UL (ref 4.8–10.8)

## 2019-12-11 PROCEDURE — 80053 COMPREHEN METABOLIC PANEL: CPT

## 2019-12-11 PROCEDURE — 85041 AUTOMATED RBC COUNT: CPT

## 2019-12-11 PROCEDURE — 84443 ASSAY THYROID STIM HORMONE: CPT | Mod: GA

## 2019-12-11 PROCEDURE — 85014 HEMATOCRIT: CPT

## 2019-12-11 PROCEDURE — 36415 COLL VENOUS BLD VENIPUNCTURE: CPT | Mod: GA

## 2019-12-11 PROCEDURE — 85048 AUTOMATED LEUKOCYTE COUNT: CPT

## 2019-12-11 PROCEDURE — 85018 HEMOGLOBIN: CPT

## 2019-12-11 PROCEDURE — 80061 LIPID PANEL: CPT | Mod: GA

## 2019-12-11 RX ORDER — BUDESONIDE AND FORMOTEROL FUMARATE DIHYDRATE 160; 4.5 UG/1; UG/1
2 AEROSOL RESPIRATORY (INHALATION) 2 TIMES DAILY
Qty: 2 INHALER | Refills: 0 | Status: SHIPPED | OUTPATIENT
Start: 2019-12-11 | End: 2020-02-21 | Stop reason: SDUPTHER

## 2019-12-11 NOTE — TELEPHONE ENCOUNTER
Pt called in requesting samples of Symbicort and Incruse as he is having extreme financial difficulties at this times and is almost out.

## 2019-12-16 NOTE — TELEPHONE ENCOUNTER
Dispensed Symbicort 160 ; Incruse 62.5 samples x2.    Lot#: [id number] ; Ex3C  Exp: Jan 2021 for Both     Provider: ANILA LEIGH    Logged distribution of sample on data sheet.     Dispensed by: Maggy Wyatt      pt notified via vm that samples were available and ready for  at [facility]

## 2019-12-19 ENCOUNTER — TELEPHONE (OUTPATIENT)
Dept: VASCULAR LAB | Facility: MEDICAL CENTER | Age: 69
End: 2019-12-19

## 2019-12-19 NOTE — TELEPHONE ENCOUNTER
Case discussed with Dr. Ced Flynn who is planning evar  Will defer that recommendation to his expertise  Will arrange for medical management and surveillance after procedure    Michael Bloch, MD  [facility]

## 2020-01-19 ENCOUNTER — APPOINTMENT (OUTPATIENT)
Dept: RADIOLOGY | Facility: MEDICAL CENTER | Age: 70
End: 2020-01-19
Attending: EMERGENCY MEDICINE
Payer: MEDICARE

## 2020-01-19 ENCOUNTER — HOSPITAL ENCOUNTER (EMERGENCY)
Facility: MEDICAL CENTER | Age: 70
End: 2020-01-19
Attending: EMERGENCY MEDICINE
Payer: MEDICARE

## 2020-01-19 VITALS
DIASTOLIC BLOOD PRESSURE: 73 MMHG | HEART RATE: 85 BPM | SYSTOLIC BLOOD PRESSURE: 108 MMHG | RESPIRATION RATE: 15 BRPM | OXYGEN SATURATION: 97 % | BODY MASS INDEX: 18.04 KG/M2 | TEMPERATURE: 96 F | WEIGHT: 126 LBS | HEIGHT: 70 IN

## 2020-01-19 DIAGNOSIS — J44.1 ACUTE EXACERBATION OF CHRONIC OBSTRUCTIVE PULMONARY DISEASE (COPD) (HCC): ICD-10-CM

## 2020-01-19 DIAGNOSIS — R60.0 LOWER EXTREMITY EDEMA: ICD-10-CM

## 2020-01-19 LAB
ALBUMIN SERPL BCP-MCNC: 4.1 G/DL (ref 3.2–4.9)
ALBUMIN/GLOB SERPL: 1.6 G/DL
ALP SERPL-CCNC: 84 U/L (ref 30–99)
ALT SERPL-CCNC: 66 U/L (ref 2–50)
ANION GAP SERPL CALC-SCNC: 11 MMOL/L (ref 0–11.9)
AST SERPL-CCNC: 82 U/L (ref 12–45)
BASOPHILS # BLD AUTO: 0.1 % (ref 0–1.8)
BASOPHILS # BLD: 0.01 K/UL (ref 0–0.12)
BILIRUB SERPL-MCNC: 0.5 MG/DL (ref 0.1–1.5)
BUN SERPL-MCNC: 11 MG/DL (ref 8–22)
CALCIUM SERPL-MCNC: 9.3 MG/DL (ref 8.5–10.5)
CHLORIDE SERPL-SCNC: 101 MMOL/L (ref 96–112)
CO2 SERPL-SCNC: 26 MMOL/L (ref 20–33)
CREAT SERPL-MCNC: 0.86 MG/DL (ref 0.5–1.4)
EKG IMPRESSION: NORMAL
EOSINOPHIL # BLD AUTO: 0 K/UL (ref 0–0.51)
EOSINOPHIL NFR BLD: 0 % (ref 0–6.9)
ERYTHROCYTE [DISTWIDTH] IN BLOOD BY AUTOMATED COUNT: 43.4 FL (ref 35.9–50)
GLOBULIN SER CALC-MCNC: 2.5 G/DL (ref 1.9–3.5)
GLUCOSE SERPL-MCNC: 146 MG/DL (ref 65–99)
HCT VFR BLD AUTO: 47.2 % (ref 42–52)
HGB BLD-MCNC: 15.9 G/DL (ref 14–18)
IMM GRANULOCYTES # BLD AUTO: 0.02 K/UL (ref 0–0.11)
IMM GRANULOCYTES NFR BLD AUTO: 0.3 % (ref 0–0.9)
LYMPHOCYTES # BLD AUTO: 0.49 K/UL (ref 1–4.8)
LYMPHOCYTES NFR BLD: 6.6 % (ref 22–41)
MCH RBC QN AUTO: 30.9 PG (ref 27–33)
MCHC RBC AUTO-ENTMCNC: 33.7 G/DL (ref 33.7–35.3)
MCV RBC AUTO: 91.8 FL (ref 81.4–97.8)
MONOCYTES # BLD AUTO: 0.23 K/UL (ref 0–0.85)
MONOCYTES NFR BLD AUTO: 3.1 % (ref 0–13.4)
NEUTROPHILS # BLD AUTO: 6.68 K/UL (ref 1.82–7.42)
NEUTROPHILS NFR BLD: 89.9 % (ref 44–72)
NRBC # BLD AUTO: 0 K/UL
NRBC BLD-RTO: 0 /100 WBC
NT-PROBNP SERPL IA-MCNC: 310 PG/ML (ref 0–125)
PLATELET # BLD AUTO: 162 K/UL (ref 164–446)
PMV BLD AUTO: 10.7 FL (ref 9–12.9)
POTASSIUM SERPL-SCNC: 4.4 MMOL/L (ref 3.6–5.5)
PROT SERPL-MCNC: 6.6 G/DL (ref 6–8.2)
RBC # BLD AUTO: 5.14 M/UL (ref 4.7–6.1)
SODIUM SERPL-SCNC: 138 MMOL/L (ref 135–145)
TROPONIN T SERPL-MCNC: 26 NG/L (ref 6–19)
WBC # BLD AUTO: 7.4 K/UL (ref 4.8–10.8)

## 2020-01-19 PROCEDURE — 99284 EMERGENCY DEPT VISIT MOD MDM: CPT

## 2020-01-19 PROCEDURE — 93005 ELECTROCARDIOGRAM TRACING: CPT

## 2020-01-19 PROCEDURE — 80053 COMPREHEN METABOLIC PANEL: CPT

## 2020-01-19 PROCEDURE — 93005 ELECTROCARDIOGRAM TRACING: CPT | Performed by: EMERGENCY MEDICINE

## 2020-01-19 PROCEDURE — 83880 ASSAY OF NATRIURETIC PEPTIDE: CPT

## 2020-01-19 PROCEDURE — 84484 ASSAY OF TROPONIN QUANT: CPT

## 2020-01-19 PROCEDURE — 71045 X-RAY EXAM CHEST 1 VIEW: CPT

## 2020-01-19 PROCEDURE — 304561 HCHG STAT O2

## 2020-01-19 PROCEDURE — 36415 COLL VENOUS BLD VENIPUNCTURE: CPT

## 2020-01-19 PROCEDURE — 85025 COMPLETE CBC W/AUTO DIFF WBC: CPT

## 2020-01-19 NOTE — ED TRIAGE NOTES
"Bib ems. Pt awoken from sleep at approx 2345 with acute shortness of breath. Hx of COPD and asthma. Attempted to use albuterol inhaler but was unable to take large enough breath for it to take effect. Pt does not wear O2 at home. Received albuterol treatment in route without relief. Pt reportedly has hx of aortic aneurism which is being monitored. BP= bilaterally.  Denies chest pain, N/V. Reports increased lower extremity swelling for past \"couple weeks\".     Chart up for ERP    "

## 2020-01-19 NOTE — ED NOTES
Reviewed DC instructions with pt. Pt verbalized understanding. DC'd IV, catheter intact. Pt ambulatory with steady gait to lobby be transported home by friend. VSS on room air. Pt A/Ox4 leaving unit.

## 2020-01-19 NOTE — DISCHARGE INSTRUCTIONS
Take your steroids that you have already started  If you have any worsening difficulty breathing over the next 24 hours despite nebulizers return to the ED for further evaluation

## 2020-01-19 NOTE — ED PROVIDER NOTES
"ED Provider Note    CHIEF COMPLAINT  Chief Complaint   Patient presents with   • Shortness of Breath       HPI  Jigar Kirkland is a 69 y.o. male who presents to the emergency department chief complaint of shortness of breath.  Patient has a history of COPD emphysema states he is on 2 L as needed at home.  He states today he felt his chest getting \"inflamed\" and started a steroid taper.  He states he woke up from sleep this evening feeling short of breath and was little anxious he was found to be hypoxic for EMS and given breathing treatments prior to arrival.  He states he is already feeling much better.  He denies change in his productive cough although he does endorse some new lower extremity edema over the last week.  He denies any chest pain or productive cough any fevers or chills nausea or vomiting back pain abdominal pain unilateral weakness numbness or tingling    REVIEW OF SYSTEMS  Positives as above. Pertinent negatives include  chest pain or productive cough any fevers or chills nausea or vomiting back pain abdominal pain unilateral weakness numbness or tingling diarrhea rash  All other review of systems are negative    PAST MEDICAL HISTORY   has a past medical history of Aortic aneurysm ([facility]), Bronchitis, Chronic airway obstruction, not elsewhere classified, and COPD (chronic obstructive pulmonary disease) ([facility]).    SOCIAL HISTORY  Social History     Tobacco Use   • Smoking status: Former Smoker     Packs/day: 1.00     Years: 50.00     Pack years: 50.00     Types: Cigarettes     Last attempt to quit: 2014     Years since quittin.5   • Smokeless tobacco: Never Used   Substance and Sexual Activity   • Alcohol use: No     Alcohol/week: 0.0 oz   • Drug use: No   • Sexual activity: Yes     Partners: Female     Comment: Off and on       SURGICAL HISTORY   has a past surgical history that includes knee arthroscopy and knee orif.    CURRENT MEDICATIONS  Home Medications     Reviewed by Angle HUNT" "YOON Enrique (Registered Nurse) on 01/19/20 at 0034  Med List Status: <None>   Medication Last Dose Status   albuterol (VENTOLIN HFA) 108 (90 Base) MCG/ACT Aero Soln inhalation aerosol  Active   aspirin 81 MG tablet  Active   budesonide-formoterol (SYMBICORT) 160-4.5 MCG/ACT Aerosol  Active   budesonide-formoterol (SYMBICORT) 160-4.5 MCG/ACT Aerosol  Active   ipratropium-albuterol (DUONEB) 0.5-2.5 (3) MG/3ML nebulizer solution  Active   omeprazole (PRILOSEC) 20 MG delayed-release capsule  Active   oxycodone immediate release (ROXICODONE) 10 MG immediate release tablet  Active   polyethylene glycol 3350 (MIRALAX) Powder  Active   Umeclidinium Bromide (INCRUSE ELLIPTA) 62.5 MCG/INH AEROSOL POWDER, BREATH ACTIVATED  Active                ALLERGIES  Allergies   Allergen Reactions   • Bactrim Ds Shortness of Breath   • Spiriva        PHYSICAL EXAM  VITAL SIGNS: /81   Pulse 99   Temp (!) 35.6 °C (96 °F) (Oral)   Resp 20   Ht 1.778 m (5' 10\")   Wt 57.2 kg (126 lb)   SpO2 97%   BMI 18.08 kg/m²    Pulse ox interpretation: I interpret this pulse ox as normal.  Constitutional: Alert in no apparent distress.  HENT: Normocephalic atraumatic, MMM  Eyes: PER, Conjunctiva normal, Non-icteric.   Neck: Normal range of motion, No tenderness, Supple, No stridor.   Cardiovascular: Regular rate and rhythm, no murmurs.   Thorax & Lungs: Normal breath sounds, No respiratory distress, scattered wheezes bilateral bases, No chest tenderness.   Abdomen: Bowel sounds normal, Soft, No tenderness, No pulsatile masses. No peritoneal signs.  Skin: Warm, Dry, No erythema, No rash.   Back: No bony tenderness, No CVA tenderness.   Extremities/MSK: Intact distal pulses, 2+ pitting edema bilateral lower extremities, No tenderness, No cyanosis, no major deformities noted  Neurologic: Alert and oriented x3, No focal deficits noted.       DIFFERENTIAL DIAGNOSIS AND WORK UP PLAN    This is a 69 y.o. male who presents with shortness of breath " and hypoxia improved after breathing treatment with EMS he is on 2 L nasal cannula and that is his baseline nasal cannula at home as needed.  He has some lower extremity edema which is new over the last weeks will evaluate for an cardiac cause versus a renal cause of this dysfunction versus just a COPD emphysema exacerbation.  He is not have any fevers he is little hypothermic year but he denies any change in his cough or productive sputum.      DIAGNOSTIC STUDIES / PROCEDURES    EKG  Results for orders placed or performed during the hospital encounter of 20   EKG   Result Value Ref Range    Report       [facility] Emergency Dept.    Test Date:  2020  Pt Name:    MICHAEL VEGAS               Department: ER  MRN:        [id number]                      Room:        01  Gender:     Male                         Technician: [id number]  :        [id number]                   Requested By:ER TRIAGE PROTOCOL  Order #:    [id number]                    Reading MD: Bri Clark MD    Measurements  Intervals                                Axis  Rate:       98                           P:          95  WV:         132                          QRS:        105  QRSD:       94                           T:          61  QT:         376  QTc:        481    Interpretive Statements  Sinus rhythm at 98 no ST elevations or ST depressions there is a PVC there is    baseline artifact normal intervals normal axis no pathognomonic Q waves  Compared to ECG 2015 21:32:24  No significant change from prior  Electronically Signed On 2020 1:45:36 PST by Bri Clark MD         LABS  Pertinent Lab Findings  CBC with a thrombocytopenia otherwise mild left shift, CMP with mildly elevated LFTs otherwise normal troponin 26 and proBNP 310      RADIOLOGY  DX-CHEST-PORTABLE (1 VIEW)   Final Result      COPD. No focal consolidation or pleural effusions.        The radiologist's interpretation of all radiological  "studies have been reviewed by me.      COURSE & MEDICAL DECISION MAKING  Pertinent Labs & Imaging studies reviewed. (See chart for details)    2:16 AM  I reassessed patient at the bedside, he states he is doing much better he is on his baseline 2 L nasal cannula he has been resting comfortably have not had to intervene further, patient only has a troponin of 26 mildly elevated BNP due to his lower extremity edema but nothing's extremely significant.  He has follow-up with primary care provider, is not having any chest pain and he is already started steroids at home.  He feels comfortable going home and return in the next 24 hours for any new or worsening difficulty breathing or new chest discomfort as he has not had any.  He understands feels comfortable at home    /73   Pulse 85   Temp (!) 35.6 °C (96 °F) (Oral)   Resp 15   Ht 1.778 m (5' 10\")   Wt 57.2 kg (126 lb)   SpO2 97%   BMI 18.08 kg/m²     The patient will return for new or worsening symptoms and is stable at the time of discharge.    The patient is referred to a primary physician for blood pressure management, diabetic screening, and for all other preventative health concerns.    DISPOSITION:  Patient will be discharged home in stable condition.    FOLLOW UP:  [facility], Emergency Dept  [address] [facility] [id number] [phone]  In 1 day  If symptoms worsen      OUTPATIENT MEDICATIONS:  Discharge Medication List as of 1/19/2020  2:26 AM            FINAL IMPRESSION  1. Acute exacerbation of chronic obstructive pulmonary disease (COPD) (HCC)     2. Lower extremity edema             Electronically signed by: Bri lCark M.D., 1/19/2020 12:53 AM    This dictation has been created using voice recognition software and/or scribes. The accuracy of the dictation is limited by the abilities of the software and the expertise of the scribes. I expect there may be some errors of grammar and possibly content. I made " every attempt to manually correct the errors within my dictation. However, errors related to voice recognition software and/or scribes may still exist and should be interpreted within the appropriate context.

## 2020-01-24 ENCOUNTER — OFFICE VISIT (OUTPATIENT)
Dept: PULMONOLOGY | Facility: HOSPICE | Age: 70
End: 2020-01-24
Payer: MEDICARE

## 2020-01-24 VITALS
DIASTOLIC BLOOD PRESSURE: 68 MMHG | HEART RATE: 110 BPM | HEIGHT: 72 IN | SYSTOLIC BLOOD PRESSURE: 124 MMHG | OXYGEN SATURATION: 92 % | WEIGHT: 124 LBS | BODY MASS INDEX: 16.8 KG/M2

## 2020-01-24 DIAGNOSIS — R14.0 BLOATING: ICD-10-CM

## 2020-01-24 DIAGNOSIS — R09.02 HYPOXIA: ICD-10-CM

## 2020-01-24 DIAGNOSIS — J44.9 CHRONIC OBSTRUCTIVE PULMONARY DISEASE, UNSPECIFIED COPD TYPE (HCC): ICD-10-CM

## 2020-01-24 PROCEDURE — 99214 OFFICE O/P EST MOD 30 MIN: CPT | Performed by: NURSE PRACTITIONER

## 2020-01-24 RX ORDER — PREDNISONE 10 MG/1
TABLET ORAL
Qty: 18 TAB | Refills: 2 | Status: SHIPPED | OUTPATIENT
Start: 2020-01-24

## 2020-01-24 RX ORDER — ALBUTEROL SULFATE 90 UG/1
2 AEROSOL, METERED RESPIRATORY (INHALATION) EVERY 4 HOURS PRN
Qty: 1 INHALER | Refills: 2 | Status: SHIPPED | OUTPATIENT
Start: 2020-01-24 | End: 2020-04-06 | Stop reason: SDUPTHER

## 2020-01-24 NOTE — PATIENT INSTRUCTIONS
1) Continue Symbicort 160/4.5 and Incruse dependent on cost  2) Continue rescue and nebulizer as needed    2) Vaccines: Up to date with Pneumovax 23 and Prevnar 13, flu  3) Continue using oxygen at 2L nocturnally and with exertion as needed  4) Zpack and Medrol pack for emergency. Prednisone taper now for likely exacerbation and to help with rib pain.   5) Continue weekly exercise. Referral to pulmonary rehab  6) Astelin nasal spray for rhinitis  8) Find primary to establish care. Encourage to f/u with cardiology with hx of aortic anuerysm and bloating.   9) Continue smoking cessation   10) Acid reflux meds x 14 days for trial period  11) Return in about 2 months (around 3/24/2020) for follow up with [facility], if not sooner, Echocardiogram, pulmonary function results.

## 2020-01-24 NOTE — PROGRESS NOTES
CC:  Here for f/u pulmonary issues as listed below    HPI:   Jigar presents today for follow up for emphysema.  Past medical history of aortic aneurysm.     PFTs from 3/2019 is stable from 2017 and indicate a Fev1 of 1.35L or 39% predicted with a mild bronchodilator response, Fev1/FVC ratio of 36, DLCO 48%.  He is a former smoker of 45 pack year history, quit 2014. Had CAT scan performed September 1, 2015 with demonstrates severe emphysema.  Patient has a history of a aneurysm with plans for treatment, but unable to complete due to poor dentition.   Cat scan completed 4/24/2018 Again emphysema and scattered areas of areas of peripheral fibrosis. Referred to lung cancer screening, but declines referral to the program.      Patient is compliant using Symbicort 160-4.5, 2 puffs twice a day with mouth rinse.  He stopped using Incruse, because the price went up. Has been using his rescue inhaler appx 1-2x/day. Using Duoneb as needed every few weeks.  He wants to purchase an Inogen concentrator, but Multi-ox today showed he did not not qualify for daytime oxygen.  He will continue to benefit from 2 L of oxygen at night. Previous HST from 2017 did not show SAMUEL, but did demonstrate nocturnal hypoxia.      Dyspnea is same since last OV, but admits there are times he has abrupt dypsnea.  He has good days and bad days with unknown triggers.  He required emergency antibiotic and medrol pack Jan 2019. Chronic cough that is dry.  He complains of chest pressure after he eats dinner every night. He uses his rescue inhaler and eventually it goes away. He notices his stomach protrudes after meals. He had a fall, did not go to ER, but has RU chest muscle related pain. He again, does not have a primary, although we have discussed the importance of this in the past.   He denies wheeze,  fever and chills. He stopped lifting weights and exercise, but used to bicycle daily. Admits he is losing motivation.  His wife is chronically ill and  taking care of her at home.         Patient Active Problem List    Diagnosis Date Noted   • Hypoxia 2018   • Nocturnal oxygen desaturation 2017   • Need for Zostavax administration 10/09/2015   • Herpes labialis 10/09/2015   • Aortic aneurysm ([facility]) 2014   • Cluster headaches 2014   • Neck mass 2014   • COPD (chronic obstructive pulmonary disease) ([facility]) 2014       Past Medical History:   Diagnosis Date   • Aortic aneurysm ([facility])    • Bronchitis    • Chronic airway obstruction, not elsewhere classified    • COPD (chronic obstructive pulmonary disease) ([facility])        Past Surgical History:   Procedure Laterality Date   • KNEE ARTHROSCOPY     • KNEE ORIF         Family History   Problem Relation Age of Onset   • Cancer Mother         Type unspec.   • Cancer Father         Type unspec.       Social History     Tobacco Use   • Smoking status: Former Smoker     Packs/day: 1.00     Years: 50.00     Pack years: 50.00     Types: Cigarettes     Last attempt to quit: 2014     Years since quittin.5   • Smokeless tobacco: Never Used   Substance Use Topics   • Alcohol use: No     Alcohol/week: 0.0 oz   • Drug use: No       Current Outpatient Medications   Medication Sig Dispense Refill   • predniSONE (DELTASONE) 10 MG Tab Take 30mg x 3 days, then take 20mg x 3 days, then take 10mg x 3 days, with food, then discontinue. 18 Tab 2   • albuterol (VENTOLIN HFA) 108 (90 Base) MCG/ACT Aero Soln inhalation aerosol Inhale 2 Puffs by mouth every four hours as needed for Shortness of Breath. 1 Inhaler 2   • Umeclidinium Bromide (INCRUSE ELLIPTA) 62.5 MCG/INH AEROSOL POWDER, BREATH ACTIVATED Inhale 1 Puff by mouth every day. 1 month sample 2 Each 0   • ipratropium-albuterol (DUONEB) 0.5-2.5 (3) MG/3ML nebulizer solution 3 mL by Nebulization route every four hours as needed for Shortness of Breath (Wheezing). 120 Vial 5   • budesonide-formoterol (SYMBICORT) 160-4.5 MCG/ACT Aerosol Inhale 2 Puffs by  mouth 2 Times a Day. 1 Inhaler 11   • polyethylene glycol 3350 (MIRALAX) Powder MIX 17G IN LIQUID OF CHOICE AND DRINK BY MOUTH ONCE DAILY AS DIRECTED 527 g 0   • oxycodone immediate release (ROXICODONE) 10 MG immediate release tablet      • budesonide-formoterol (SYMBICORT) 160-4.5 MCG/ACT Aerosol Inhale 2 Puffs by mouth 2 Times a Day. With mouth rinse 2 Inhaler 0   • omeprazole (PRILOSEC) 20 MG delayed-release capsule Take 1 Cap by mouth every day. 30 Cap 11   • aspirin 81 MG tablet Take 81 mg by mouth every day.       No current facility-administered medications for this visit.           Allergies: Bactrim ds and Spiriva          ROS   Gen: Denies fever, chills, unintentional weight loss, fatigue, night sweats  E/N/T: Denies nasal congestion, ear pain  Resp:Denies  wheezing,  hemoptysis  CV: Denies chest pain, chest tightness, palpitations  Sleep:Denies morning headache  Neuro: Denies frequent headaches, weakness, dizziness  GI: Denies acid reflux, N/V  See HPI.  All other systems reviewed and negative          Vital signs for this encounter:  Vitals:    01/24/20 1247 01/24/20 1248   Height: 1.829 m (6')    Weight: 56.2 kg (124 lb)    Weight % change since last entry.: 0 %    BP: 124/68    Pulse: (!) 110    BMI (Calculated): 16.82    O2 sat % room air:  92 %                 Physical Exam:   Appearance: well developed, well nourished, no acute distress.   Eyes: PERRL, EOM intact, sclere white, conjunctiva moist.  Ears: no lesions or deformities.  Hearing: grossly intact.  Nose: no lesions or deformities.  Dentition: good dentition.   Oropharynx: tongue normal, posterior pharynx without erythema or exudate.  Neck: supple, trachea midline, no masses.  Respiratory effort: no intercostal retractions or use of accessory muscles.  Lung auscultation: Bilateral diminished   Heart auscultation: no murmur, rub, or gallop   Extremities: no cyanosis or edema.  Abdomen: soft, non-tender, no masses.  Gait and station: without  difficulty   Digits and Nails: no clubbing, cyanosis, petechiae, or nodes.  Cranial nerves: grossly normal.  Motor: no focal deficits observed.   Skin: no rashes, lesions, or ulcers noted.  Orientation: oriented to time, place, and person.  Mood and affect: mood and affect appropriate, normal interaction with examiner.      Assessment   1. Chronic obstructive pulmonary disease, unspecified COPD type (HCC)  albuterol (VENTOLIN HFA) 108 (90 Base) MCG/ACT Aero Soln inhalation aerosol    REFERRAL TO OTHER    Multiple Oximetry    EC-ECHOCARDIOGRAM COMPLETE W/O CONT    REFERRAL TO FOLLOW-UP WITH PRIMARY CARE    PULMONARY FUNCTION TESTS -Test requested: Complete Pulmonary Function Test   2. Hypoxia  Multiple Oximetry    EC-ECHOCARDIOGRAM COMPLETE W/O CONT    REFERRAL TO FOLLOW-UP WITH PRIMARY CARE    PULMONARY FUNCTION TESTS -Test requested: Complete Pulmonary Function Test   3. Bloating  REFERRAL TO FOLLOW-UP WITH PRIMARY CARE    PULMONARY FUNCTION TESTS -Test requested: Complete Pulmonary Function Test       Patient was seen for 25 minutes, more than 50% of time spent in face to face review, counseling, and arranging future evaluation and follow up of medical conditions and care relating to medication management.  He unfortunately cannot use Incruse due to cost.  We have offered samples.  Exercise intolerance multifactorial including progression of lung disease, sedentary lifestyle, lack of exercise, cardiac history, COPD exacerbation.  Will place him on prednisone taper for likely exacerbation without antibiotics given lack of sick symptoms. This should also help with rib pain.  Will encourage an echocardiogram for further evaluation of dyspnea.   Again stressed establish with primary for ongoing ailments.  Patient is clinically stable and will have the following changes per plan.     PLAN:   Patient Instructions   1) Continue Symbicort 160/4.5 and Incruse dependent on cost  2) Continue rescue and nebulizer as needed     2) Vaccines: Up to date with Pneumovax 23 and Prevnar 13, flu  3) Continue using oxygen at 2L nocturnally and with exertion as needed  4) Zpack and Medrol pack for emergency. Prednisone taper now for likely exacerbation and to help with rib pain.   5) Continue weekly exercise. Referral to pulmonary rehab  6) Astelin nasal spray for rhinitis  8) Find primary to establish care. Encourage to f/u with cardiology with hx of aortic anuerysm and bloating.   9) Continue smoking cessation   10) Acid reflux meds x 14 days for trial period  11) Return in about 2 months (around 3/24/2020) for follow up with [facility], if not sooner, Echocardiogram, pulmonary function results.

## 2020-02-01 ENCOUNTER — OFFICE VISIT (OUTPATIENT)
Dept: URGENT CARE | Facility: CLINIC | Age: 70
End: 2020-02-01
Payer: MEDICARE

## 2020-02-01 ENCOUNTER — APPOINTMENT (OUTPATIENT)
Dept: RADIOLOGY | Facility: IMAGING CENTER | Age: 70
End: 2020-02-01
Attending: NURSE PRACTITIONER
Payer: MEDICARE

## 2020-02-01 VITALS
OXYGEN SATURATION: 90 % | HEART RATE: 100 BPM | TEMPERATURE: 97.3 F | WEIGHT: 122.8 LBS | SYSTOLIC BLOOD PRESSURE: 122 MMHG | RESPIRATION RATE: 18 BRPM | BODY MASS INDEX: 16.63 KG/M2 | DIASTOLIC BLOOD PRESSURE: 82 MMHG | HEIGHT: 72 IN

## 2020-02-01 DIAGNOSIS — S80.11XA CONTUSION OF RIGHT LOWER LEG, INITIAL ENCOUNTER: ICD-10-CM

## 2020-02-01 DIAGNOSIS — S80.12XA CONTUSION OF LEFT LOWER EXTREMITY, INITIAL ENCOUNTER: ICD-10-CM

## 2020-02-01 DIAGNOSIS — S60.211A CONTUSION OF RIGHT WRIST, INITIAL ENCOUNTER: ICD-10-CM

## 2020-02-01 DIAGNOSIS — S60.221A CONTUSION OF RIGHT HAND, INITIAL ENCOUNTER: ICD-10-CM
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
## 2020-02-01 PROCEDURE — 73130 X-RAY EXAM OF HAND: CPT | Mod: TC,RT | Performed by: NURSE PRACTITIONER

## 2020-02-01 PROCEDURE — 99214 OFFICE O/P EST MOD 30 MIN: CPT | Performed by: NURSE PRACTITIONER

## 2020-02-01 PROCEDURE — 73110 X-RAY EXAM OF WRIST: CPT | Mod: TC,RT | Performed by: NURSE PRACTITIONER

## 2020-02-01 PROCEDURE — 73590 X-RAY EXAM OF LOWER LEG: CPT | Mod: TC,RT | Performed by: NURSE PRACTITIONER

## 2020-02-01 ASSESSMENT — ENCOUNTER SYMPTOMS
CHILLS: 0
FEVER: 0

## 2020-02-02 NOTE — PROGRESS NOTES
Subjective:      Jigar Kirkland is a 69 y.o. male who presents with Fall (x fell 1 week ago, having pain and swelling in his legs now )    Past Medical History:   Diagnosis Date   • Aortic aneurysm (HCC)    • Bronchitis    • Chronic airway obstruction, not elsewhere classified    • COPD (chronic obstructive pulmonary disease) (HCC)      Social History     Socioeconomic History   • Marital status:      Spouse name: Not on file   • Number of children: Not on file   • Years of education: Not on file   • Highest education level: Not on file   Occupational History   • Not on file   Social Needs   • Financial resource strain: Not on file   • Food insecurity:     Worry: Not on file     Inability: Not on file   • Transportation needs:     Medical: Not on file     Non-medical: Not on file   Tobacco Use   • Smoking status: Former Smoker     Packs/day: 1.00     Years: 50.00     Pack years: 50.00     Types: Cigarettes     Last attempt to quit: 2014     Years since quittin.5   • Smokeless tobacco: Never Used   Substance and Sexual Activity   • Alcohol use: No     Alcohol/week: 0.0 oz   • Drug use: No   • Sexual activity: Yes     Partners: Female     Comment: Off and on   Lifestyle   • Physical activity:     Days per week: Not on file     Minutes per session: Not on file   • Stress: Not on file   Relationships   • Social connections:     Talks on phone: Not on file     Gets together: Not on file     Attends Orthodoxy service: Not on file     Active member of club or organization: Not on file     Attends meetings of clubs or organizations: Not on file     Relationship status: Not on file   • Intimate partner violence:     Fear of current or ex partner: Not on file     Emotionally abused: Not on file     Physically abused: Not on file     Forced sexual activity: Not on file   Other Topics Concern   • Not on file   Social History Narrative   • Not on file     Family History   Problem Relation Age of Onset   •  Cancer Mother         Type unspec.   • Cancer Father         Type unspec.       Allergies: Bactrim ds and Spiriva    Patient is a 69-year-old male who presents today with multiple complaints.    Complaint #1: Fall 1 week ago.  He now has bilateral leg pain and abrasions and contusions to the left lower leg and mild pain and swelling to the right lower leg.  Also has pain and skin tears to the dorsal aspect of the right wrist and swelling and pain to the right hand and wrist.  States he did strike the right side of his face but is not having any pain, swelling, or discoloration.    Complaint #2: Chronic shortness of breath and leg swelling.  Patient was evaluated for this in the emergency room less than 2 weeks ago and is needing a primary care physician for follow-up.    Complaint #3: Bilateral foot rash times many months.  Again patient does not have a primary care physician and is requesting evaluation of his feet today.          Fall   The fall occurred while walking. Distance fallen: GLF. There was no blood loss. The pain is moderate. The symptoms are aggravated by ambulation. Pertinent negatives include no fever. He has tried nothing for the symptoms. The treatment provided no relief.       Review of Systems   Constitutional: Negative for chills and fever.   Musculoskeletal:        Leg swelling  Leg pain bilateral  Right hand pain  Right wrist pain   Skin: Positive for rash.        Bilateral foot rash   All other systems reviewed and are negative.         Objective:     /82 (BP Location: Left arm, Patient Position: Sitting, BP Cuff Size: Adult)   Pulse 100   Temp 36.3 °C (97.3 °F) (Temporal)   Resp 18   Ht 1.829 m (6')   Wt 55.7 kg (122 lb 12.8 oz)   SpO2 90%   BMI 16.65 kg/m²      Physical Exam  Vitals signs reviewed.   Constitutional:       Appearance: Normal appearance.   HENT:      Head: Normocephalic and atraumatic.      Nose: Nose normal.      Mouth/Throat:      Mouth: Mucous membranes are  dry.   Eyes:      Extraocular Movements: Extraocular movements intact.      Conjunctiva/sclera: Conjunctivae normal.      Pupils: Pupils are equal, round, and reactive to light.   Neck:      Musculoskeletal: Normal range of motion and neck supple.   Cardiovascular:      Rate and Rhythm: Normal rate and regular rhythm.      Heart sounds: Normal heart sounds.   Pulmonary:      Effort: Pulmonary effort is normal.      Breath sounds: Normal breath sounds.   Musculoskeletal: Normal range of motion.   Skin:     General: Skin is warm and dry.      Capillary Refill: Capillary refill takes less than 2 seconds.      Findings: Rash present.   Neurological:      Mental Status: He is oriented to person, place, and time.   Psychiatric:         Mood and Affect: Mood normal.         Behavior: Behavior normal.         Thought Content: Thought content normal.         Judgment: Judgment normal.       2/1/2020 4:10 PM     HISTORY/REASON FOR EXAM:  Right wrist pain. Fell one week ago.     TECHNIQUE/EXAM DESCRIPTION AND NUMBER OF VIEWS:  4 views of the RIGHT wrist.     COMPARISON:     FINDINGS:  Bone mineralization is normal. There is a possible small nondisplaced fracture involving the radial styloid. There is some soft tissue calcification. There is mild osteoarthritis of the radiocarpal, triscaphe and first carpometacarpal joints.     IMPRESSION:     Possible small nondisplaced fracture at the tip of the radial styloid.    All other x-rays are read as negative, although osseous demineralization is noted, thus creating possibility for undetected fracture per radiologist          Assessment/Plan:   Possible right wrist fracture  Bilateral leg contusions    Splint placed  Tylenol/Motrin as needed  Referral to Dr. Cortez given for follow-up  Referral given to family practice to establish care  Strict ER precautions for worsening of symptoms    There are no diagnoses linked to this encounter.

## 2020-02-04 ENCOUNTER — OFFICE VISIT (OUTPATIENT)
Dept: VASCULAR LAB | Facility: MEDICAL CENTER | Age: 70
End: 2020-02-04
Attending: INTERNAL MEDICINE
Payer: MEDICARE

## 2020-02-04 VITALS
HEIGHT: 72 IN | BODY MASS INDEX: 16.85 KG/M2 | HEART RATE: 103 BPM | DIASTOLIC BLOOD PRESSURE: 80 MMHG | WEIGHT: 124.4 LBS | SYSTOLIC BLOOD PRESSURE: 127 MMHG

## 2020-02-04 DIAGNOSIS — I71.9 AORTIC ANEURYSM WITHOUT RUPTURE, UNSPECIFIED PORTION OF AORTA (HCC): ICD-10-CM

## 2020-02-04 PROCEDURE — 99213 OFFICE O/P EST LOW 20 MIN: CPT | Performed by: INTERNAL MEDICINE

## 2020-02-04 PROCEDURE — 99212 OFFICE O/P EST SF 10 MIN: CPT

## 2020-02-04 ASSESSMENT — ENCOUNTER SYMPTOMS
DEPRESSION: 1
NERVOUS/ANXIOUS: 0
SENSORY CHANGE: 0
DIZZINESS: 0
FLANK PAIN: 0
DIARRHEA: 0
CONSTIPATION: 1
BACK PAIN: 1
COUGH: 1
BLOOD IN STOOL: 0
SPEECH CHANGE: 0
PALPITATIONS: 0
WEIGHT LOSS: 1
ABDOMINAL PAIN: 1
HEADACHES: 1
FOCAL WEAKNESS: 0
SHORTNESS OF BREATH: 1

## 2020-02-04 NOTE — PROGRESS NOTES
Follow Up VASCULAR VISIT  Subjective:   Jigar Kirkland is a 69 y.o. male who presents today 20 for   Chief Complaint   Patient presents with   • Follow-Up     HPI:  Patient here for follow-up of AAA.  He saw surgeon and was scheduled for potential endovascular repair, but in the meantime he was evaluated by an oral surgeon and he has had multiple oral infections.  The oral surgeon recommended he have multiple extractions prior to having his surgery.  Unfortunately, he has been unable to afford his out-of-pocket to have these extractions done.  He is called a few different dentists and oral surgeons including [facility] and once again cannot find anyone who can do it affordably.  He continues to have belly pain.  He continues to lose weight.  He is on no at hypertensives.  His blood pressures been checked at a few different doctors visits and was found to be less than 130/80.  He is not on lipid-lowering therapy.  He has not had any chest pain.  He is back on aspirin.  He is not smoking    Social History     Tobacco Use   Smoking Status Former Smoker   • Packs/day: 1.00   • Years: 50.00   • Pack years: 50.00   • Types: Cigarettes   • Last attempt to quit: 2014   • Years since quittin.5   Smokeless Tobacco Never Used     DIET AND EXERCISE:  Weight Change:Lost about 40-50 pounds in past 5-6 years. Now stabilized for 2-3 years   Diet: common adult  Exercise: minimal exercise     Review of Systems   Constitutional: Positive for malaise/fatigue and weight loss.   Respiratory: Positive for cough and shortness of breath.    Cardiovascular: Negative for chest pain, palpitations and leg swelling.   Gastrointestinal: Positive for abdominal pain and constipation. Negative for blood in stool and diarrhea.   Genitourinary: Negative for flank pain.   Musculoskeletal: Positive for back pain and joint pain.   Neurological: Positive for headaches. Negative for dizziness, sensory change, speech  "change and focal weakness.   Psychiatric/Behavioral: Positive for depression. The patient is not nervous/anxious.       Objective:     Vitals:    02/04/20 1130   BP: 127/80   BP Location: Left arm   Patient Position: Sitting   BP Cuff Size: Small adult   Pulse: (!) 103   Weight: 56.4 kg (124 lb 6.4 oz)   Height: 1.82 m (5' 11.65\")      Body mass index is 17.04 kg/m².  Physical Exam  Vitals signs reviewed.   Constitutional:       General: He is not in acute distress.     Appearance: He is well-developed. He is not diaphoretic.      Comments: thin   Neck:      Thyroid: No thyromegaly.      Vascular: No JVD.   Cardiovascular:      Rate and Rhythm: Normal rate and regular rhythm.      Heart sounds: Normal heart sounds. No murmur.   Pulmonary:      Effort: No respiratory distress.      Breath sounds: Normal breath sounds. No wheezing or rales.   Abdominal:      General: Bowel sounds are normal. There is distension.      Palpations: Abdomen is soft. There is no mass.      Tenderness: There is no tenderness. There is no rebound.   Musculoskeletal:         General: No tenderness.      Right lower leg: No edema.      Left lower leg: No edema.   Neurological:      Mental Status: He is alert and oriented to person, place, and time.      Cranial Nerves: No cranial nerve deficit.      Coordination: Coordination normal.   Psychiatric:         Mood and Affect: Mood normal.         Behavior: Behavior normal.       Lab Results   Component Value Date    CHOLSTRLTOT 157 12/11/2019    LDL 96 12/11/2019    HDL 48 12/11/2019    TRIGLYCERIDE 66 12/11/2019           Lab Results   Component Value Date    SODIUM 138 01/19/2020    POTASSIUM 4.4 01/19/2020    CHLORIDE 101 01/19/2020    CO2 26 01/19/2020    GLUCOSE 146 (H) 01/19/2020    BUN 11 01/19/2020    CREATININE 0.86 01/19/2020    IFAFRICA >60 01/19/2020    IFNOTAFR >60 01/19/2020        Lab Results   Component Value Date    WBC 7.4 01/19/2020    RBC 5.14 01/19/2020    HEMOGLOBIN 15.9 " 01/19/2020    HEMATOCRIT 47.2 01/19/2020    MCV 91.8 01/19/2020    MCH 30.9 01/19/2020    MCHC 33.7 01/19/2020    MPV 10.7 01/19/2020      CT abdomen and pelvis November 2019  1.  Interval increase in size of infrarenal abdominal aortic aneurysm from 4.4 cm in diameter on 10/30/2014 to 5.3 cm currently.  No evidence to indicate aneurysm rupture.  2.  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  3.  No bowel obstruction or focal mesenteric inflammatory process.  4.  Findings concerning for stones or debris within the distal common bile duct, without significant biliary dilation.  Mass is felt less likely.  Minimally prominent pancreatic duct noted.  5.  Appendix is not visualized.    Medical Decision Making:  Today's Assessment / Status / Plan:     1. Aortic aneurysm without rupture, unspecified portion of aorta (HCC)       Patient Type: Secondary Prevention    Etiology of Established CVD if Present: AAA    Lipid Management: Qualifies for Statin Therapy Based [place] [address]  ACC/AHA Guidelines: yes  Calculated 10-Year Risk of ASCVD: N/A  Currently on Statin: No  Patient qualifies for lipid-lowering therapy given his ASCVD-Check lipid panel, however given his reasonable lipid panel will not start until after surgery  -Continue lifestyle modification for now  -Consider statin therapy in the future    Blood Pressure Management:  ACC-AHA blood pressure goal less than 130/80  No known history of hypertension  Blood pressure below goal in office  -Continue to follow blood pressure in the office  -Consider anti-potential therapy only if above 130/80 on average    Glycemic Status: Normal  -Continue to follow over time    Anti-Platelet/Anti-Coagulant Tx: Continue low-dose aspirin    Smoking: Continue complete avoidance    Physical Activity: Try to do some walking every day    Weight Management and Nutrition: Per GI recommendations    Other:     1. AAA  -without previous intervention.  Has grown from 4.4 to 5.3 cm over the past 5 years.  It is very difficult to determine whether or not it is symptomatic but certainly some of his symptoms, particular the pain when bending over could be due to his aneurysm.  Symptoms essentially unchanged  Patient has been scheduled for evar but it has been postponed due to the concern of seeding his stent graft from his recurrent oral infections  Unfortunately he has been unable to afford his out-of-pocket for those extractions  -Continue medical management as above  -I have given him the name of a number of different dentist to discuss doing the procedure to reduce cost  -I have asked him to make a follow-up appointment to see Dr. Webster to discuss other potential options including having these extractions done as an inpatient or even doing an open repair where the risk of seeding the graft may be less  -Assuming no intervention will repeat a CTA in 6 months    2.  Common bile duct stone -unclear whether or not this is responsible for his belly pain.  Symptoms certainly not classic for stone disease.  -Await vascular surgery recommendations as above  -We will also refer to GI for further recommendations    3.  Unintentional weight loss -  -defer to GI recommendations    We will need to reestablish with PCP in the future  We will partner with other providers in the management of established vascular disease and cardiometabolic risk factors.    Studies to Be Obtained: CTA thoracoabdominal aorta in May unless intervention taken prior to this  Labs to Be Obtained: As above prior to next visit    Follow up in: 6 weeks    Michael J Bloch, M.D.     Cc:  [facility]  Ced Flynn

## 2020-02-21 DIAGNOSIS — J44.9 CHRONIC OBSTRUCTIVE PULMONARY DISEASE, UNSPECIFIED COPD TYPE (HCC): ICD-10-CM

## 2020-02-21 RX ORDER — BUDESONIDE AND FORMOTEROL FUMARATE DIHYDRATE 160; 4.5 UG/1; UG/1
2 AEROSOL RESPIRATORY (INHALATION) 2 TIMES DAILY
Qty: 2 INHALER | Refills: 0 | Status: ON HOLD | COMMUNITY
Start: 2020-02-21 | End: 2020-03-11

## 2020-02-21 NOTE — TELEPHONE ENCOUNTER
Have we ever prescribed this med? Yes.  If yes, what date? 12/11/2019    Last OV: 01/24/2020- ANILA Culver    Next OV: 04/24/2020- ANILA Culver     DX: Chronic obstructive pulmonary disease, unspecified COPD type (HCC) (J44.9)    Medications:   Requested Prescriptions     Pending Prescriptions Disp Refills   • budesonide-formoterol (SYMBICORT) 160-4.5 MCG/ACT Aerosol 2 Inhaler 0     Sig: Inhale 2 Puffs by mouth 2 Times a Day. With mouth rinse   • Umeclidinium Bromide (INCRUSE ELLIPTA) 62.5 MCG/INH AEROSOL POWDER, BREATH ACTIVATED 2 Each 0     Sig: Inhale 1 Puff by mouth every day. 1 month sample

## 2020-03-10 ENCOUNTER — ANESTHESIA EVENT (OUTPATIENT)
Dept: SURGERY | Facility: MEDICAL CENTER | Age: 70
DRG: 269 | End: 2020-03-10
Payer: MEDICARE

## 2020-03-10 DIAGNOSIS — Z01.812 PRE-PROCEDURAL LABORATORY EXAMINATION: ICD-10-CM

## 2020-03-10 LAB
ANION GAP SERPL CALC-SCNC: 9 MMOL/L (ref 0–11.9)
BUN SERPL-MCNC: 13 MG/DL (ref 8–22)
CALCIUM SERPL-MCNC: 10.3 MG/DL (ref 8.5–10.5)
CHLORIDE SERPL-SCNC: 101 MMOL/L (ref 96–112)
CO2 SERPL-SCNC: 29 MMOL/L (ref 20–33)
CREAT SERPL-MCNC: 0.89 MG/DL (ref 0.5–1.4)
ERYTHROCYTE [DISTWIDTH] IN BLOOD BY AUTOMATED COUNT: 46.3 FL (ref 35.9–50)
GLUCOSE SERPL-MCNC: 93 MG/DL (ref 65–99)
HCT VFR BLD AUTO: 50 % (ref 42–52)
HGB BLD-MCNC: 16.3 G/DL (ref 14–18)
MCH RBC QN AUTO: 30.8 PG (ref 27–33)
MCHC RBC AUTO-ENTMCNC: 32.6 G/DL (ref 33.7–35.3)
MCV RBC AUTO: 94.3 FL (ref 81.4–97.8)
PLATELET # BLD AUTO: 210 K/UL (ref 164–446)
PMV BLD AUTO: 11 FL (ref 9–12.9)
POTASSIUM SERPL-SCNC: 5.2 MMOL/L (ref 3.6–5.5)
RBC # BLD AUTO: 5.3 M/UL (ref 4.7–6.1)
SODIUM SERPL-SCNC: 139 MMOL/L (ref 135–145)
WBC # BLD AUTO: 13.2 K/UL (ref 4.8–10.8)

## 2020-03-10 PROCEDURE — 36415 COLL VENOUS BLD VENIPUNCTURE: CPT

## 2020-03-10 PROCEDURE — 85027 COMPLETE CBC AUTOMATED: CPT

## 2020-03-10 PROCEDURE — 80048 BASIC METABOLIC PNL TOTAL CA: CPT

## 2020-03-10 ASSESSMENT — FIBROSIS 4 INDEX: FIB4 SCORE: 4.36

## 2020-03-10 NOTE — OR NURSING
COVID-19 Pre-surgery screenin. Do you have an undiagnosed respiratory illness or symptoms such as coughing or sneezing? (Yes/No)  a. Onset of Sx   b. Acute vs. chronic respiratory illness      2. Do you have an unexplained fever greater than 100.4 degrees Fahrenheit or 38 degrees Celsius?                   (Yes/No)     3. Have you had direct exposure to a patient who tested positive for Covid-19?                         (Yes/No)     4. Have you traveled within the last 14 days to [place], [place], [place], [place], or [place]?                     (Yes/No)    Unable to complete - no answer at phone number provided and unable to leave a voicemail message.

## 2020-03-11 ENCOUNTER — APPOINTMENT (OUTPATIENT)
Dept: RADIOLOGY | Facility: MEDICAL CENTER | Age: 70
DRG: 269 | End: 2020-03-11
Attending: SURGERY
Payer: MEDICARE

## 2020-03-11 ENCOUNTER — ANESTHESIA (OUTPATIENT)
Dept: SURGERY | Facility: MEDICAL CENTER | Age: 70
DRG: 269 | End: 2020-03-11
Payer: MEDICARE

## 2020-03-11 ENCOUNTER — HOSPITAL ENCOUNTER (INPATIENT)
Facility: MEDICAL CENTER | Age: 70
LOS: 1 days | DRG: 269 | End: 2020-03-12
Attending: SURGERY | Admitting: SURGERY
Payer: MEDICARE

## 2020-03-11 DIAGNOSIS — G89.18 POST-OP PAIN: ICD-10-CM

## 2020-03-11 DIAGNOSIS — J44.9 CHRONIC OBSTRUCTIVE PULMONARY DISEASE, UNSPECIFIED COPD TYPE (HCC): ICD-10-CM

## 2020-03-11 LAB
ABO + RH BLD: NORMAL
ABO GROUP BLD: NORMAL
BLD GP AB SCN SERPL QL: NORMAL
RH BLD: NORMAL

## 2020-03-11 PROCEDURE — C1725 CATH, TRANSLUMIN NON-LASER: HCPCS | Performed by: SURGERY

## 2020-03-11 PROCEDURE — 770001 HCHG ROOM/CARE - MED/SURG/GYN PRIV*

## 2020-03-11 PROCEDURE — 160035 HCHG PACU - 1ST 60 MINS PHASE I: Performed by: SURGERY

## 2020-03-11 PROCEDURE — 700111 HCHG RX REV CODE 636 W/ 250 OVERRIDE (IP): Performed by: ANESTHESIOLOGY

## 2020-03-11 PROCEDURE — 500869 HCHG NEEDLE, THINWALL 19GA (COOK): Performed by: SURGERY

## 2020-03-11 PROCEDURE — 700111 HCHG RX REV CODE 636 W/ 250 OVERRIDE (IP)

## 2020-03-11 PROCEDURE — A9270 NON-COVERED ITEM OR SERVICE: HCPCS | Performed by: NURSE PRACTITIONER

## 2020-03-11 PROCEDURE — 86900 BLOOD TYPING SEROLOGIC ABO: CPT

## 2020-03-11 PROCEDURE — 700105 HCHG RX REV CODE 258: Performed by: SURGERY

## 2020-03-11 PROCEDURE — C1781 MESH (IMPLANTABLE): HCPCS | Performed by: SURGERY

## 2020-03-11 PROCEDURE — A6402 STERILE GAUZE <= 16 SQ IN: HCPCS | Performed by: SURGERY

## 2020-03-11 PROCEDURE — 700102 HCHG RX REV CODE 250 W/ 637 OVERRIDE(OP): Performed by: NURSE PRACTITIONER

## 2020-03-11 PROCEDURE — 306397 CUSHION, WAFFLE ADULT 19X19: Performed by: SURGERY

## 2020-03-11 PROCEDURE — 04V03DZ RESTRICTION OF ABDOMINAL AORTA WITH INTRALUMINAL DEVICE, PERCUTANEOUS APPROACH: ICD-10-PCS | Performed by: SURGERY

## 2020-03-11 PROCEDURE — 86901 BLOOD TYPING SEROLOGIC RH(D): CPT

## 2020-03-11 PROCEDURE — 501445 HCHG STAPLER, SKIN DISP: Performed by: SURGERY

## 2020-03-11 PROCEDURE — 160036 HCHG PACU - EA ADDL 30 MINS PHASE I: Performed by: SURGERY

## 2020-03-11 PROCEDURE — 160041 HCHG SURGERY MINUTES - EA ADDL 1 MIN LEVEL 4: Performed by: SURGERY

## 2020-03-11 PROCEDURE — 160009 HCHG ANES TIME/MIN: Performed by: SURGERY

## 2020-03-11 PROCEDURE — C1894 INTRO/SHEATH, NON-LASER: HCPCS | Performed by: SURGERY

## 2020-03-11 PROCEDURE — 500258 HCHG CATH, SIZING OMNI 5F 70CM: Performed by: SURGERY

## 2020-03-11 PROCEDURE — 160029 HCHG SURGERY MINUTES - 1ST 30 MINS LEVEL 4: Performed by: SURGERY

## 2020-03-11 PROCEDURE — 700111 HCHG RX REV CODE 636 W/ 250 OVERRIDE (IP): Performed by: SURGERY

## 2020-03-11 PROCEDURE — 501837 HCHG SUTURE CV: Performed by: SURGERY

## 2020-03-11 PROCEDURE — 110372 HCHG SHELL REV 278: Performed by: SURGERY

## 2020-03-11 PROCEDURE — 700105 HCHG RX REV CODE 258: Performed by: ANESTHESIOLOGY

## 2020-03-11 PROCEDURE — 501838 HCHG SUTURE GENERAL: Performed by: SURGERY

## 2020-03-11 PROCEDURE — 86850 RBC ANTIBODY SCREEN: CPT

## 2020-03-11 PROCEDURE — C1769 GUIDE WIRE: HCPCS | Performed by: SURGERY

## 2020-03-11 PROCEDURE — 700101 HCHG RX REV CODE 250: Performed by: SURGERY

## 2020-03-11 PROCEDURE — 160002 HCHG RECOVERY MINUTES (STAT): Performed by: SURGERY

## 2020-03-11 PROCEDURE — 700102 HCHG RX REV CODE 250 W/ 637 OVERRIDE(OP): Performed by: ANESTHESIOLOGY

## 2020-03-11 PROCEDURE — 160048 HCHG OR STATISTICAL LEVEL 1-5: Performed by: SURGERY

## 2020-03-11 PROCEDURE — 0YU60JZ SUPPLEMENT LEFT INGUINAL REGION WITH SYNTHETIC SUBSTITUTE, OPEN APPROACH: ICD-10-PCS | Performed by: SURGERY

## 2020-03-11 PROCEDURE — 110454 HCHG SHELL REV 250: Performed by: SURGERY

## 2020-03-11 PROCEDURE — 502000 HCHG MISC OR IMPLANTS RC 0278: Performed by: SURGERY

## 2020-03-11 PROCEDURE — 500380 HCHG DRAIN, PENROSE 1/4X12: Performed by: SURGERY

## 2020-03-11 PROCEDURE — 700101 HCHG RX REV CODE 250: Performed by: ANESTHESIOLOGY

## 2020-03-11 PROCEDURE — A9270 NON-COVERED ITEM OR SERVICE: HCPCS | Performed by: ANESTHESIOLOGY

## 2020-03-11 DEVICE — IMPLANTABLE DEVICE: Type: IMPLANTABLE DEVICE | Site: GROIN | Status: FUNCTIONAL

## 2020-03-11 DEVICE — MESH PROGRIP LEFT (1/EA): Type: IMPLANTABLE DEVICE | Site: GROIN | Status: FUNCTIONAL

## 2020-03-11 RX ORDER — DEXAMETHASONE SODIUM PHOSPHATE 4 MG/ML
4 INJECTION, SOLUTION INTRA-ARTICULAR; INTRALESIONAL; INTRAMUSCULAR; INTRAVENOUS; SOFT TISSUE
Status: DISCONTINUED | OUTPATIENT
Start: 2020-03-11 | End: 2020-03-12 | Stop reason: HOSPADM

## 2020-03-11 RX ORDER — HYDRALAZINE HYDROCHLORIDE 20 MG/ML
10 INJECTION INTRAMUSCULAR; INTRAVENOUS EVERY 4 HOURS PRN
Status: DISCONTINUED | OUTPATIENT
Start: 2020-03-11 | End: 2020-03-12 | Stop reason: HOSPADM

## 2020-03-11 RX ORDER — MIDAZOLAM HYDROCHLORIDE 1 MG/ML
INJECTION INTRAMUSCULAR; INTRAVENOUS PRN
Status: DISCONTINUED | OUTPATIENT
Start: 2020-03-11 | End: 2020-03-11 | Stop reason: SURG

## 2020-03-11 RX ORDER — HEPARIN SODIUM,PORCINE 1000/ML
VIAL (ML) INJECTION PRN
Status: DISCONTINUED | OUTPATIENT
Start: 2020-03-11 | End: 2020-03-11 | Stop reason: SURG

## 2020-03-11 RX ORDER — SODIUM CHLORIDE, SODIUM LACTATE, POTASSIUM CHLORIDE, CALCIUM CHLORIDE 600; 310; 30; 20 MG/100ML; MG/100ML; MG/100ML; MG/100ML
INJECTION, SOLUTION INTRAVENOUS CONTINUOUS
Status: DISCONTINUED | OUTPATIENT
Start: 2020-03-11 | End: 2020-03-11 | Stop reason: HOSPADM

## 2020-03-11 RX ORDER — ROCURONIUM BROMIDE 10 MG/ML
INJECTION, SOLUTION INTRAVENOUS PRN
Status: DISCONTINUED | OUTPATIENT
Start: 2020-03-11 | End: 2020-03-11 | Stop reason: SURG

## 2020-03-11 RX ORDER — SODIUM CHLORIDE, SODIUM LACTATE, POTASSIUM CHLORIDE, CALCIUM CHLORIDE 600; 310; 30; 20 MG/100ML; MG/100ML; MG/100ML; MG/100ML
INJECTION, SOLUTION INTRAVENOUS CONTINUOUS
Status: ACTIVE | OUTPATIENT
Start: 2020-03-11 | End: 2020-03-11

## 2020-03-11 RX ORDER — PROTAMINE SULFATE 10 MG/ML
INJECTION, SOLUTION INTRAVENOUS PRN
Status: DISCONTINUED | OUTPATIENT
Start: 2020-03-11 | End: 2020-03-11 | Stop reason: SURG

## 2020-03-11 RX ORDER — DIPHENHYDRAMINE HYDROCHLORIDE 50 MG/ML
12.5 INJECTION INTRAMUSCULAR; INTRAVENOUS
Status: DISCONTINUED | OUTPATIENT
Start: 2020-03-11 | End: 2020-03-11 | Stop reason: HOSPADM

## 2020-03-11 RX ORDER — DEXAMETHASONE SODIUM PHOSPHATE 4 MG/ML
INJECTION, SOLUTION INTRA-ARTICULAR; INTRALESIONAL; INTRAMUSCULAR; INTRAVENOUS; SOFT TISSUE PRN
Status: DISCONTINUED | OUTPATIENT
Start: 2020-03-11 | End: 2020-03-11 | Stop reason: SURG

## 2020-03-11 RX ORDER — SCOLOPAMINE TRANSDERMAL SYSTEM 1 MG/1
1 PATCH, EXTENDED RELEASE TRANSDERMAL
Status: DISCONTINUED | OUTPATIENT
Start: 2020-03-11 | End: 2020-03-12 | Stop reason: HOSPADM

## 2020-03-11 RX ORDER — HEPARIN SODIUM,PORCINE 1000/ML
VIAL (ML) INJECTION
Status: DISCONTINUED | OUTPATIENT
Start: 2020-03-11 | End: 2020-03-11 | Stop reason: HOSPADM

## 2020-03-11 RX ORDER — BUDESONIDE AND FORMOTEROL FUMARATE DIHYDRATE 160; 4.5 UG/1; UG/1
2 AEROSOL RESPIRATORY (INHALATION) 2 TIMES DAILY
Status: DISCONTINUED | OUTPATIENT
Start: 2020-03-11 | End: 2020-03-12 | Stop reason: HOSPADM

## 2020-03-11 RX ORDER — HALOPERIDOL 5 MG/ML
1 INJECTION INTRAMUSCULAR EVERY 6 HOURS PRN
Status: DISCONTINUED | OUTPATIENT
Start: 2020-03-11 | End: 2020-03-12 | Stop reason: HOSPADM

## 2020-03-11 RX ORDER — BUPIVACAINE HYDROCHLORIDE AND EPINEPHRINE 5; 5 MG/ML; UG/ML
INJECTION, SOLUTION EPIDURAL; INTRACAUDAL; PERINEURAL
Status: DISCONTINUED | OUTPATIENT
Start: 2020-03-11 | End: 2020-03-11 | Stop reason: HOSPADM

## 2020-03-11 RX ORDER — OXYCODONE HYDROCHLORIDE 10 MG/1
10 TABLET ORAL
Status: DISCONTINUED | OUTPATIENT
Start: 2020-03-11 | End: 2020-03-12 | Stop reason: HOSPADM

## 2020-03-11 RX ORDER — POLYETHYLENE GLYCOL 3350 17 G/17G
17 POWDER, FOR SOLUTION ORAL DAILY
Status: DISCONTINUED | OUTPATIENT
Start: 2020-03-12 | End: 2020-03-11

## 2020-03-11 RX ORDER — DIPHENHYDRAMINE HYDROCHLORIDE 50 MG/ML
25 INJECTION INTRAMUSCULAR; INTRAVENOUS EVERY 6 HOURS PRN
Status: DISCONTINUED | OUTPATIENT
Start: 2020-03-11 | End: 2020-03-12 | Stop reason: HOSPADM

## 2020-03-11 RX ORDER — DEXTROSE MONOHYDRATE, SODIUM CHLORIDE, AND POTASSIUM CHLORIDE 50; 1.49; 4.5 G/1000ML; G/1000ML; G/1000ML
INJECTION, SOLUTION INTRAVENOUS EVERY 6 HOURS
Status: ACTIVE | OUTPATIENT
Start: 2020-03-11 | End: 2020-03-11

## 2020-03-11 RX ORDER — LIDOCAINE HYDROCHLORIDE 10 MG/ML
INJECTION, SOLUTION EPIDURAL; INFILTRATION; INTRACAUDAL; PERINEURAL
Status: COMPLETED
Start: 2020-03-11 | End: 2020-03-11

## 2020-03-11 RX ORDER — ONDANSETRON 2 MG/ML
4 INJECTION INTRAMUSCULAR; INTRAVENOUS
Status: COMPLETED | OUTPATIENT
Start: 2020-03-11 | End: 2020-03-11

## 2020-03-11 RX ORDER — HYDROMORPHONE HYDROCHLORIDE 1 MG/ML
0.4 INJECTION, SOLUTION INTRAMUSCULAR; INTRAVENOUS; SUBCUTANEOUS
Status: DISCONTINUED | OUTPATIENT
Start: 2020-03-11 | End: 2020-03-11 | Stop reason: HOSPADM

## 2020-03-11 RX ORDER — CLONIDINE HYDROCHLORIDE 0.1 MG/1
0.1 TABLET ORAL
Status: DISCONTINUED | OUTPATIENT
Start: 2020-03-11 | End: 2020-03-12 | Stop reason: HOSPADM

## 2020-03-11 RX ORDER — OXYCODONE HYDROCHLORIDE AND ACETAMINOPHEN 5; 325 MG/1; MG/1
1 TABLET ORAL
Status: COMPLETED | OUTPATIENT
Start: 2020-03-11 | End: 2020-03-11

## 2020-03-11 RX ORDER — ALBUTEROL SULFATE 90 UG/1
2 AEROSOL, METERED RESPIRATORY (INHALATION) EVERY 4 HOURS PRN
Status: DISCONTINUED | OUTPATIENT
Start: 2020-03-11 | End: 2020-03-12 | Stop reason: HOSPADM

## 2020-03-11 RX ORDER — CHLORHEXIDINE GLUCONATE ORAL RINSE 1.2 MG/ML
15 SOLUTION DENTAL EVERY EVENING
COMMUNITY
Start: 2019-12-30

## 2020-03-11 RX ORDER — HYDROMORPHONE HYDROCHLORIDE 1 MG/ML
0.1 INJECTION, SOLUTION INTRAMUSCULAR; INTRAVENOUS; SUBCUTANEOUS
Status: DISCONTINUED | OUTPATIENT
Start: 2020-03-11 | End: 2020-03-11 | Stop reason: HOSPADM

## 2020-03-11 RX ORDER — ONDANSETRON 2 MG/ML
4 INJECTION INTRAMUSCULAR; INTRAVENOUS EVERY 4 HOURS PRN
Status: DISCONTINUED | OUTPATIENT
Start: 2020-03-11 | End: 2020-03-12 | Stop reason: HOSPADM

## 2020-03-11 RX ORDER — PHENYLEPHRINE HCL IN 0.9% NACL 0.5 MG/5ML
SYRINGE (ML) INTRAVENOUS PRN
Status: DISCONTINUED | OUTPATIENT
Start: 2020-03-11 | End: 2020-03-11 | Stop reason: SURG

## 2020-03-11 RX ORDER — HYDROMORPHONE HYDROCHLORIDE 1 MG/ML
0.2 INJECTION, SOLUTION INTRAMUSCULAR; INTRAVENOUS; SUBCUTANEOUS
Status: DISCONTINUED | OUTPATIENT
Start: 2020-03-11 | End: 2020-03-11 | Stop reason: HOSPADM

## 2020-03-11 RX ORDER — HALOPERIDOL 5 MG/ML
1 INJECTION INTRAMUSCULAR
Status: COMPLETED | OUTPATIENT
Start: 2020-03-11 | End: 2020-03-11

## 2020-03-11 RX ORDER — ONDANSETRON 2 MG/ML
INJECTION INTRAMUSCULAR; INTRAVENOUS PRN
Status: DISCONTINUED | OUTPATIENT
Start: 2020-03-11 | End: 2020-03-11 | Stop reason: SURG

## 2020-03-11 RX ORDER — CLONIDINE HYDROCHLORIDE 0.1 MG/1
0.2 TABLET ORAL
Status: DISCONTINUED | OUTPATIENT
Start: 2020-03-11 | End: 2020-03-12 | Stop reason: HOSPADM

## 2020-03-11 RX ORDER — CEFAZOLIN SODIUM 1 G/3ML
INJECTION, POWDER, FOR SOLUTION INTRAMUSCULAR; INTRAVENOUS PRN
Status: DISCONTINUED | OUTPATIENT
Start: 2020-03-11 | End: 2020-03-11 | Stop reason: SURG

## 2020-03-11 RX ORDER — IPRATROPIUM BROMIDE AND ALBUTEROL SULFATE 2.5; .5 MG/3ML; MG/3ML
3 SOLUTION RESPIRATORY (INHALATION) EVERY 4 HOURS PRN
Status: DISCONTINUED | OUTPATIENT
Start: 2020-03-11 | End: 2020-03-12 | Stop reason: HOSPADM

## 2020-03-11 RX ORDER — OXYCODONE HYDROCHLORIDE AND ACETAMINOPHEN 5; 325 MG/1; MG/1
2 TABLET ORAL
Status: COMPLETED | OUTPATIENT
Start: 2020-03-11 | End: 2020-03-11

## 2020-03-11 RX ORDER — LABETALOL HYDROCHLORIDE 5 MG/ML
10 INJECTION, SOLUTION INTRAVENOUS EVERY 4 HOURS PRN
Status: DISCONTINUED | OUTPATIENT
Start: 2020-03-11 | End: 2020-03-12 | Stop reason: HOSPADM

## 2020-03-11 RX ORDER — LIDOCAINE HYDROCHLORIDE 20 MG/ML
INJECTION, SOLUTION EPIDURAL; INFILTRATION; INTRACAUDAL; PERINEURAL PRN
Status: DISCONTINUED | OUTPATIENT
Start: 2020-03-11 | End: 2020-03-11 | Stop reason: SURG

## 2020-03-11 RX ORDER — HYDRALAZINE HYDROCHLORIDE 20 MG/ML
5 INJECTION INTRAMUSCULAR; INTRAVENOUS
Status: DISCONTINUED | OUTPATIENT
Start: 2020-03-11 | End: 2020-03-11 | Stop reason: HOSPADM

## 2020-03-11 RX ADMIN — LIDOCAINE HYDROCHLORIDE 0.5 ML: 10 INJECTION, SOLUTION EPIDURAL; INFILTRATION; INTRACAUDAL at 10:16

## 2020-03-11 RX ADMIN — OXYCODONE HYDROCHLORIDE 10 MG: 10 TABLET ORAL at 23:34

## 2020-03-11 RX ADMIN — GLYCOPYRROLATE 1 CAPSULE: 15.6 CAPSULE RESPIRATORY (INHALATION) at 23:10

## 2020-03-11 RX ADMIN — FENTANYL CITRATE 25 MCG: 50 INJECTION, SOLUTION INTRAMUSCULAR; INTRAVENOUS at 12:35

## 2020-03-11 RX ADMIN — HEPARIN SODIUM 5000 UNITS: 1000 INJECTION, SOLUTION INTRAVENOUS; SUBCUTANEOUS at 12:00

## 2020-03-11 RX ADMIN — HYDROMORPHONE HYDROCHLORIDE 0.4 MG: 1 INJECTION, SOLUTION INTRAMUSCULAR; INTRAVENOUS; SUBCUTANEOUS at 16:37

## 2020-03-11 RX ADMIN — PROPOFOL 30 MG: 10 INJECTION, EMULSION INTRAVENOUS at 11:32

## 2020-03-11 RX ADMIN — SODIUM CHLORIDE, POTASSIUM CHLORIDE, SODIUM LACTATE AND CALCIUM CHLORIDE: 600; 310; 30; 20 INJECTION, SOLUTION INTRAVENOUS at 10:16

## 2020-03-11 RX ADMIN — SUGAMMADEX 200 MG: 100 INJECTION, SOLUTION INTRAVENOUS at 12:57

## 2020-03-11 RX ADMIN — HYDROMORPHONE HYDROCHLORIDE 0.2 MG: 1 INJECTION, SOLUTION INTRAMUSCULAR; INTRAVENOUS; SUBCUTANEOUS at 14:08

## 2020-03-11 RX ADMIN — LIDOCAINE HYDROCHLORIDE 60 MG: 20 INJECTION, SOLUTION EPIDURAL; INFILTRATION; INTRACAUDAL at 11:32

## 2020-03-11 RX ADMIN — HYDROMORPHONE HYDROCHLORIDE 0.2 MG: 1 INJECTION, SOLUTION INTRAMUSCULAR; INTRAVENOUS; SUBCUTANEOUS at 13:50

## 2020-03-11 RX ADMIN — Medication 50 MCG: at 11:32

## 2020-03-11 RX ADMIN — FENTANYL CITRATE 25 MCG: 50 INJECTION, SOLUTION INTRAMUSCULAR; INTRAVENOUS at 13:11

## 2020-03-11 RX ADMIN — Medication 0.5 ML: at 10:16

## 2020-03-11 RX ADMIN — PROTAMINE SULFATE 20 MG: 10 INJECTION, SOLUTION INTRAVENOUS at 12:57

## 2020-03-11 RX ADMIN — PHENYLEPHRINE HYDROCHLORIDE 40 MCG/MIN: 10 INJECTION INTRAVENOUS at 11:33

## 2020-03-11 RX ADMIN — OXYCODONE HYDROCHLORIDE AND ACETAMINOPHEN 1 TABLET: 5; 325 TABLET ORAL at 13:40

## 2020-03-11 RX ADMIN — BUDESONIDE AND FORMOTEROL FUMARATE DIHYDRATE 2 PUFF: 160; 4.5 AEROSOL RESPIRATORY (INHALATION) at 23:12

## 2020-03-11 RX ADMIN — ONDANSETRON 4 MG: 2 INJECTION INTRAMUSCULAR; INTRAVENOUS at 13:40

## 2020-03-11 RX ADMIN — FENTANYL CITRATE 25 MCG: 50 INJECTION, SOLUTION INTRAMUSCULAR; INTRAVENOUS at 12:49

## 2020-03-11 RX ADMIN — ROCURONIUM BROMIDE 50 MG: 10 INJECTION, SOLUTION INTRAVENOUS at 11:32

## 2020-03-11 RX ADMIN — CEFAZOLIN 2 G: 330 INJECTION, POWDER, FOR SOLUTION INTRAMUSCULAR; INTRAVENOUS at 11:32

## 2020-03-11 RX ADMIN — DEXAMETHASONE SODIUM PHOSPHATE 8 MG: 4 INJECTION, SOLUTION INTRA-ARTICULAR; INTRALESIONAL; INTRAMUSCULAR; INTRAVENOUS; SOFT TISSUE at 11:34

## 2020-03-11 RX ADMIN — HALOPERIDOL LACTATE 1 MG: 5 INJECTION, SOLUTION INTRAMUSCULAR at 14:48

## 2020-03-11 RX ADMIN — HYDROMORPHONE HYDROCHLORIDE 0.4 MG: 1 INJECTION, SOLUTION INTRAMUSCULAR; INTRAVENOUS; SUBCUTANEOUS at 13:40

## 2020-03-11 RX ADMIN — MIDAZOLAM HYDROCHLORIDE 2 MG: 1 INJECTION, SOLUTION INTRAMUSCULAR; INTRAVENOUS at 11:26

## 2020-03-11 RX ADMIN — SODIUM CHLORIDE, POTASSIUM CHLORIDE, SODIUM LACTATE AND CALCIUM CHLORIDE 1000 ML: 600; 310; 30; 20 INJECTION, SOLUTION INTRAVENOUS at 14:01

## 2020-03-11 RX ADMIN — HYDROMORPHONE HYDROCHLORIDE 0.4 MG: 1 INJECTION, SOLUTION INTRAMUSCULAR; INTRAVENOUS; SUBCUTANEOUS at 17:45

## 2020-03-11 RX ADMIN — ONDANSETRON 4 MG: 2 INJECTION INTRAMUSCULAR; INTRAVENOUS at 12:57

## 2020-03-11 RX ADMIN — PROTAMINE SULFATE 10 MG: 10 INJECTION, SOLUTION INTRAVENOUS at 12:55

## 2020-03-11 RX ADMIN — HALOPERIDOL LACTATE 1 MG: 5 INJECTION, SOLUTION INTRAMUSCULAR at 16:32

## 2020-03-11 RX ADMIN — HYDROMORPHONE HYDROCHLORIDE 0.4 MG: 1 INJECTION, SOLUTION INTRAMUSCULAR; INTRAVENOUS; SUBCUTANEOUS at 13:58

## 2020-03-11 RX ADMIN — HYDROMORPHONE HYDROCHLORIDE 0.4 MG: 1 INJECTION, SOLUTION INTRAMUSCULAR; INTRAVENOUS; SUBCUTANEOUS at 13:45

## 2020-03-11 RX ADMIN — HYDROMORPHONE HYDROCHLORIDE 0.4 MG: 1 INJECTION, SOLUTION INTRAMUSCULAR; INTRAVENOUS; SUBCUTANEOUS at 14:03

## 2020-03-11 RX ADMIN — FENTANYL CITRATE 25 MCG: 50 INJECTION, SOLUTION INTRAMUSCULAR; INTRAVENOUS at 11:33

## 2020-03-11 RX ADMIN — OXYCODONE HYDROCHLORIDE 10 MG: 10 TABLET ORAL at 20:28

## 2020-03-11 ASSESSMENT — COGNITIVE AND FUNCTIONAL STATUS - GENERAL
HELP NEEDED FOR BATHING: A LITTLE
DRESSING REGULAR LOWER BODY CLOTHING: A LITTLE
CLIMB 3 TO 5 STEPS WITH RAILING: A LITTLE
MOBILITY SCORE: 18
SUGGESTED CMS G CODE MODIFIER MOBILITY: CK
MOVING TO AND FROM BED TO CHAIR: A LITTLE
MOVING FROM LYING ON BACK TO SITTING ON SIDE OF FLAT BED: A LITTLE
SUGGESTED CMS G CODE MODIFIER DAILY ACTIVITY: CJ
TURNING FROM BACK TO SIDE WHILE IN FLAT BAD: A LITTLE
STANDING UP FROM CHAIR USING ARMS: A LITTLE
DAILY ACTIVITIY SCORE: 22
WALKING IN HOSPITAL ROOM: A LITTLE

## 2020-03-11 ASSESSMENT — LIFESTYLE VARIABLES
EVER HAD A DRINK FIRST THING IN THE MORNING TO STEADY YOUR NERVES TO GET RID OF A HANGOVER: NO
HOW MANY TIMES IN THE PAST YEAR HAVE YOU HAD 5 OR MORE DRINKS IN A DAY: 0
TOTAL SCORE: 0
TOTAL SCORE: 0
CONSUMPTION TOTAL: NEGATIVE
ALCOHOL_USE: NO
TOTAL SCORE: 0
HAVE PEOPLE ANNOYED YOU BY CRITICIZING YOUR DRINKING: NO
DOES PATIENT WANT TO STOP DRINKING: NO
EVER_SMOKED: YES
ON A TYPICAL DAY WHEN YOU DRINK ALCOHOL HOW MANY DRINKS DO YOU HAVE: 0
HAVE YOU EVER FELT YOU SHOULD CUT DOWN ON YOUR DRINKING: NO
AVERAGE NUMBER OF DAYS PER WEEK YOU HAVE A DRINK CONTAINING ALCOHOL: 0
EVER FELT BAD OR GUILTY ABOUT YOUR DRINKING: NO

## 2020-03-11 ASSESSMENT — PATIENT HEALTH QUESTIONNAIRE - PHQ9
2. FEELING DOWN, DEPRESSED, IRRITABLE, OR HOPELESS: NOT AT ALL
1. LITTLE INTEREST OR PLEASURE IN DOING THINGS: NOT AT ALL
SUM OF ALL RESPONSES TO PHQ9 QUESTIONS 1 AND 2: 0

## 2020-03-11 ASSESSMENT — FIBROSIS 4 INDEX: FIB4 SCORE: 3.36

## 2020-03-11 ASSESSMENT — PAIN SCALES - GENERAL: PAIN_LEVEL: 0

## 2020-03-11 NOTE — OR NURSING
PT from OR w/ B/L groin incisions, closed w/ benzoin, steri strips 4x4 and tegaderms, CDI, soft no hematoma noted, b/l pedal pulses +1.  PT w/ c/o abdominal pain, PT medicated for pain and nausea, reports adequate pain relief, and off and on nausea, is tolerating cranberry juice w/o emesis.  PT wife called, however no answer and no message left.  VSS, sats > 94% on 3L.  PT awaiting inpatient bed, R arterial line d/c and pressure dressing applied w/ coban, PT tolerated well.

## 2020-03-11 NOTE — ANESTHESIA QCDR
2019 [facility] Clinical Data Registry (for Quality Improvement)     Postoperative nausea/vomiting risk protocol (Adult = 18 yrs and Pediatric 3-17 yrs)- (430 and 463)  General inhalation anesthetic (NOT TIVA) with PONV risk factors: Yes  Provision of anti-emetic therapy with at least 2 different classes of agents: Yes   Patient DID NOT receive anti-emetic therapy and reason is documented in Medical Record:  N/A    Multimodal Pain Management- (477)  Non-emergent surgery AND patient age >= 18: Yes  Use of Multimodal Pain Management, two or more drugs and/or interventions, NOT including systemic opioids: Yes  Exception: Documented allergy to multiple classes of analgesics: N/A    Smoking Abstinence (404)  Patient is current smoker (cigarette, pipe, e-cig, marijuanna): Yes  Elective Surgery: Yes  Abstinence instructions provided prior to day of surgery: Yes  Patient abstained from smoking on day of surgery: No    Pre-Op Beta-Blocker in Isolated CABG (44)  Isolated CABG AND patient age >= 18: No  Beta-blocker admin within 24 hours of surgical incision:   Exception:of medical reason(s) for not administering beta blocker within 24 hours prior to surgical incision (e.g., not  indicated,other medical reason):     PACU assessment of acute postoperative pain prior to Anesthesia Care End- Applies to Patients Age = 18- (ABG7)  Initial PACU pain score is which of the following: < 7/10  Patient unable to report pain score: N/A    Post-anesthetic transfer of care checklist/protocol to PACU/ICU- (426 and 427)  Upon conclusion of case, patient transferred to which of the following locations: PACU/Non-ICU  Use of transfer checklist/protocol: Yes  Exclusion: Service Performed in Patient Hospital Room (and thus did not require transfer): N/A  Unplanned admission to ICU related to anesthesia service up through end of PACU care- (MD51)  Unplanned admission to ICU (not initially anticipated at anesthesia start time): No

## 2020-03-11 NOTE — PROGRESS NOTES
Patient in pre-op, assessment completed, patient and wife cheri updated on plan of care, all questions answered, no further needs at this time, call light within reach..

## 2020-03-11 NOTE — ANESTHESIA PROCEDURE NOTES
Arterial Line  Performed by: Joann Seay M.D.  Authorized by: Joann Seay M.D.     Start Time:  3/11/2020 11:30 AM  End Time:  3/11/2020 11:32 AM  Localization: ultrasound guidance and surface landmarks  Image captured, interpreted and electronically stored.  Patient Location:  OR  Indication: continuous blood pressure monitoring    Catheter Size:  20 G  Seldinger Technique?: Yes    Laterality:  Right  Site:  Radial artery  Line Secured:  Antimicrobial disc, tape and transparent dressing  Events: patient tolerated procedure well with no complications     Successful first attempt, 0.5ml 2% lidocaine used, image stored electronically and hard copy in chart

## 2020-03-11 NOTE — ANESTHESIA PREPROCEDURE EVALUATION
69M hx COPD on 2L O2 at home most recent exacerbation 1/2020, negative cardiac workup at that time though patient has declined to follow up with TTE, symbicort BID and albuterol 1-2x/day, unable to afford multiple medications, GERD w/c with meds, former smoker, denies current ETOH or illicits. Denies HTN, CP/SOB/presyncope/syncope. Notably, his AAA has grown from 4.4 to 5.3cm and his PCP Dr. Bloch has expressed concerns that it is symptomatic. He also has a concomittant LIH. Repair has been delayed 2/2 oral infections and inability to pay for extractions. At this time he does not have any active oral infections and would like to proceed with surgery. Discussed risks/benefits especially cardiac risk several times with patient and his wife and they both wish to proceed today while he is free of oral infection in setting of expanding AAA rather than obtain TTE first and being unable to place graft 2/2 new oral infection.  Answered questions, consent obtained.   Again, reinforced with patient and wife concern re: cardiac function with BLLE pitting edema, increased SOB in the past month with increased rescue inhaler use, directed to follow up with PCP Dr. Bloch re: TTE and further cardiac evaluation.    Relevant Problems   PULMONARY   (+) COPD (chronic obstructive pulmonary disease) (HCC)      NEURO   (+) Cluster headaches      CARDIAC   (+) Aortic aneurysm (HCC)       Physical Exam    Airway   Mallampati: III  TM distance: >3 FB  Neck ROM: full       Cardiovascular - normal exam  Rhythm: regular  Rate: normal  (-) murmur     Dental - normal exam      Very poor dentition   Pulmonary - normal exam  (+) decreased breath sounds     Abdominal    Neurological - normal exam         Other findings: BLLE with 2+ pitting edeema to knees          Anesthesia Plan    ASA 3   ASA physical status 3 criteria: COPD    Plan - general       Airway plan will be ETT  ([place])      Induction: intravenous    Postoperative Plan: Postoperative  administration of opioids is intended.    Pertinent diagnostic labs and testing reviewed    Informed Consent:    Anesthetic plan and risks discussed with patient.    Use of blood products discussed with: patient whom consented to blood products.

## 2020-03-11 NOTE — OP REPORT
03/11/20    OPERATIVE NOTE    PRE-OPERATIVE DIAGNOSIS -     1.  Abdominal aortic aneurysm  2.  Left inguinal hernia    POST-OPERATIVE DIAGNOSIS -same    PROCEDURE PERFORMED -     1.  Endovascular repair of abdominal aortic aneurysm  2.  Left inguinal hernia repair with mesh  3.  Exposure bilateral common femoral arteries for delivery of endoprosthesis  4.  Catheter placement aorta x2  5.  Aortogram  6.  Placement of proximal aortic cuff    SURGEON - Ced Flynn M.D.    ASSISTANT - NANCY Jones    TYPE OF ANESTHESIA - General     ANESTHESIOLOGIST  - Dr. Seay      SPECIMENS -none    INDICATIONS - 69 y.o. gentleman with a large infrarenal abdominal otic aneurysm.  Patient also has a left inguinal hernia noted on exam.  Patient is being taken to the operating room for endovascular repair of the aortic aneurysm as well as left inguinal hernia repair.      PROCEDURE IN DETAIL -     Patient was taken to the hybrid endovascular suite at [facility] and placed in the supine position.  After adequate anesthesia and positioning the patient's abdomen bilateral groins and proximal thighs were prepped and draped in sterile fashion.  Small incision was made in oblique fashion overlying the right inguinal ligament.  Incision was carried down through the subcutaneous tissue and the right common femoral artery was circumferentially dissected and isolated with Vesseloops.  An incision was made on the contralateral side and oblique fashion just a slightly larger incision.  The incision was carried down through the subcutaneous tissue and the left common femoral artery was circumferentially dissected and isolated with Vesseloops.  5000 use of heparin was administered after an appropriate period of time thinwall access needles were inserted into the femoral arteries bilaterally and using Seldinger technique 6 Polish sheaths were placed.  Wires were advanced into the aorta and a flush angiogram catheter was  brought up to the pararenal aorta and an aortogram was performed.  That aortogram demonstrated the aneurysm and proper measurements were obtained to select the appropriate endoprosthesis.  Stiff wires were placed bilaterally of the catheter protection and a 16 Nigerian sheath was advanced up the right iliac system into the aneurysm sac.  A 12 Nigerian sheath was advanced up the left side into the aneurysm sac.  A pre-deployment angiogram was performed and a 26 mm x 12 mm x 16 cm aortic endograft from [employer] was advanced and deployed just at the level of the renal arteries.  The contralateral gate was then engaged using a Glidewire.  Retrograde angiogram was performed on the left side and proper measurements were obtained to select the appropriate contralateral limb which was a 12 mm x 12 cm.  That device was advanced up into the contralateral gate and was deployed.  Post deployment angioplasty with a compliant balloon was performed at all gaits and junctions.  Completion angiogram demonstrated what appeared to be a small type I endoleak.  A 26 mm x 3.3 cm extender cuff was advanced to just below the renal arteries and was deployed.  Post deployment angioplasty with a compliant balloon was performed.  Completion angiogram demonstrated resolution of the type I endoleak and excellent position of the endograft.  All sheaths catheters wires were retrieved.  The femoral arteries were repaired using interrupted 5-0 Prolene sutures bilaterally.  Just prior to completing the repair all vessels were adequately flushed and the vessels were repaired and flow was reestablished to the lower extremities.  30 mg of protamine was administered.  Next my attention turned toward fixing the left inguinal hernia.  Small incision was made in the external oblique the external oblique was opened to the external canal.  After exposing the contents of the canal the cord structures were encircled using a Penrose drain.  The patient was noted to have  an indirect inguinal hernia sac which was dissected carefully from the cord structures and was reduced within the peritoneal cavity.  A left sided pro- hernia mesh was selected and was placed within the surgical field.  The mesh was secured to the pubic tubercle and along the shelving edge of the inguinal ligament inferiorly.  The mesh was then splayed out across the conjoined tendon and the external canal flap was placed around the cord structures.  Once the hernia was repaired to the external oblique aponeurosis was then closed using a running 2-0 Vicryl suture.  Half percent Marcaine with epinephrine was infiltrated into the subcutaneous tissue around the region.  2-0 Vicryl running sutures were used bilaterally to close the subcutaneous tissue of the incisions.  A 4-0 strata fix suture was then used to reapproximate the skin incisions bilaterally.  Benzoin Steri-Strips sterile dressings were applied and the patient was taken to the recovery room in stable condition.      Ced Flynn M.D.

## 2020-03-11 NOTE — ANESTHESIA POSTPROCEDURE EVALUATION
Patient: Jigar Kirkland    Procedure Summary     Date:  03/11/20 Room / Location:  [facility] 19 / SURGERY [facility]    Anesthesia Start:  1126 Anesthesia Stop:  1332    Procedures:       INSERTION, ENDOVASCULAR STENT GRAFT, AORTA, ABDOMINAL (Groin)      REPAIR, HERNIA, INGUINAL (Left Groin) Diagnosis:  (ABDOMINAL ANEURYSM)    Surgeon:  Ced Flynn M.D. Responsible Provider:  Joann Seay M.D.    Anesthesia Type:  general ASA Status:  3          Final Anesthesia Type: general  Last vitals  BP   Blood Pressure : 133/77, Arterial BP: 135/59    Temp   36.5 °C (97.7 °F)    Pulse   Pulse: 100   Resp   13    SpO2   96 %      Anesthesia Post Evaluation    Patient location during evaluation: PACU  Patient participation: complete - patient participated  Level of consciousness: awake and alert  Pain score: 0    Airway patency: patent  Anesthetic complications: no  Cardiovascular status: hemodynamically stable  Respiratory status: acceptable  Hydration status: euvolemic    PONV: none           Nurse Pain Score: 0 (NPRS)

## 2020-03-11 NOTE — ANESTHESIA PROCEDURE NOTES
Airway  Date/Time: 3/11/2020 11:33 AM  Performed by: Joann Seay M.D.  Authorized by: Joann Seay M.D.     Location:  OR  Urgency:  Elective  Difficult Airway: No    Indications for Airway Management:  Anesthesia  Spontaneous Ventilation: absent    Sedation Level:  Deep  Preoxygenated: Yes    Patient Position:  Sniffing  Mask Difficulty Assessment:  0 - not attempted  Final Airway Type:  Endotracheal airway  Final Endotracheal Airway:  ETT  Cuffed: Yes    Technique Used for Successful ETT Placement:  Direct laryngoscopy  Insertion Site:  Oral  Blade Type:  Sylvia  Laryngoscope Blade/Videolaryngoscope Blade Size:  4  ETT Size (mm):  7.5  Measured from:  Teeth  ETT to Teeth (cm):  22  Placement Verified by: auscultation and capnometry    Cormack-Lehane Classification:  Grade I - full view of glottis  Number of Attempts at Approach:  1   Atraumatic, DLx1, bite block placed, eyes taped with paper tape, all PPP--karen placed awake prior to intubation and functioning after arm tucked

## 2020-03-11 NOTE — OR NURSING
PT wife updated on visiting policy, advised will let her know once PT is in a room, spoke w/ PT on phone and decided to leave.  PT has belongings at bedside, glasses are on chart, and has cell phone in hand.

## 2020-03-11 NOTE — PROGRESS NOTES
Spiritual Care Note        Patient's Name: Jigar Kirkland   MRN: [id number]    YOB: 1950   Age and Gender: 69 y.o. male   Unit: Pre-Op   Room (and Bed): 4   Ethnicity or Nationality: white   Primary Language: English   Baptism/Spiritual Preference: none   Place of Residence: [place], [place]   Family Present: wife, Rosemary   Medical Diagnosis/Procedures: abdominal aortic aneurysm without rupture  insertion, endovascular stent   graft, aorta, abdominal   Code Status: No Order    Date of Admission: 3/11/2020   Length of Stay: 0 days        Spiritual Screen Results:  Would you like to receive a visit from our Spiritual Care team or your own Caodaism or spiritual leader?   Yes       Visited With:   Patient and family together(wife)    Nature of the Visit:   Initial, On shift    Surgical Visit:   Pre-op    Referral From/Origin of Request:   Epic nursing    Observations/Symptoms:   Patient sitting up on gurney, pleasant, alert.  Wife bedside.    Interaction/Conversation:   Patient requested prayer.    Assessment:   Need--Seeking Spiritual Assistance and Support    Baptism Needs:   Prayer    Interventions:   Compassionate Presence, Prayer    Outcomes:   Progress with Trust, Spiritual Comfort    Total Time:   10 minutes      Spiritual Care Provider Information:  Chaplain ANILA Atkinson  [phone]   [email or web address]

## 2020-03-12 ENCOUNTER — TELEPHONE (OUTPATIENT)
Dept: VASCULAR LAB | Facility: MEDICAL CENTER | Age: 70
End: 2020-03-12

## 2020-03-12 VITALS
WEIGHT: 124.12 LBS | RESPIRATION RATE: 17 BRPM | OXYGEN SATURATION: 95 % | TEMPERATURE: 99.5 F | HEIGHT: 72 IN | BODY MASS INDEX: 16.81 KG/M2 | DIASTOLIC BLOOD PRESSURE: 80 MMHG | HEART RATE: 110 BPM | SYSTOLIC BLOOD PRESSURE: 143 MMHG

## 2020-03-12 DIAGNOSIS — I71.60 THORACOABDOMINAL AORTIC ANEURYSM, WITHOUT RUPTURE (HCC): ICD-10-CM

## 2020-03-12 LAB
ANION GAP SERPL CALC-SCNC: 16 MMOL/L (ref 7–16)
BUN SERPL-MCNC: 14 MG/DL (ref 8–22)
CALCIUM SERPL-MCNC: 8.7 MG/DL (ref 8.4–10.2)
CHLORIDE SERPL-SCNC: 95 MMOL/L (ref 96–112)
CO2 SERPL-SCNC: 23 MMOL/L (ref 20–33)
CREAT SERPL-MCNC: 0.77 MG/DL (ref 0.5–1.4)
ERYTHROCYTE [DISTWIDTH] IN BLOOD BY AUTOMATED COUNT: 44 FL (ref 35.9–50)
GLUCOSE SERPL-MCNC: 102 MG/DL (ref 65–99)
HCT VFR BLD AUTO: 42.3 % (ref 42–52)
HGB BLD-MCNC: 14.1 G/DL (ref 14–18)
MCH RBC QN AUTO: 30.3 PG (ref 27–33)
MCHC RBC AUTO-ENTMCNC: 33.3 G/DL (ref 33.7–35.3)
MCV RBC AUTO: 91 FL (ref 81.4–97.8)
PLATELET # BLD AUTO: 166 K/UL (ref 164–446)
PMV BLD AUTO: 10.6 FL (ref 9–12.9)
POTASSIUM SERPL-SCNC: 4.8 MMOL/L (ref 3.6–5.5)
RBC # BLD AUTO: 4.65 M/UL (ref 4.7–6.1)
SODIUM SERPL-SCNC: 134 MMOL/L (ref 135–145)
WBC # BLD AUTO: 16.4 K/UL (ref 4.8–10.8)

## 2020-03-12 PROCEDURE — 85027 COMPLETE CBC AUTOMATED: CPT

## 2020-03-12 PROCEDURE — 36415 COLL VENOUS BLD VENIPUNCTURE: CPT

## 2020-03-12 PROCEDURE — A9270 NON-COVERED ITEM OR SERVICE: HCPCS | Performed by: NURSE PRACTITIONER

## 2020-03-12 PROCEDURE — A9270 NON-COVERED ITEM OR SERVICE: HCPCS | Performed by: SURGERY

## 2020-03-12 PROCEDURE — 80048 BASIC METABOLIC PNL TOTAL CA: CPT

## 2020-03-12 PROCEDURE — 700102 HCHG RX REV CODE 250 W/ 637 OVERRIDE(OP): Performed by: NURSE PRACTITIONER

## 2020-03-12 PROCEDURE — 700102 HCHG RX REV CODE 250 W/ 637 OVERRIDE(OP): Performed by: SURGERY

## 2020-03-12 RX ORDER — LORAZEPAM 1 MG/1
1 TABLET ORAL EVERY 4 HOURS PRN
Status: DISCONTINUED | OUTPATIENT
Start: 2020-03-12 | End: 2020-03-12 | Stop reason: HOSPADM

## 2020-03-12 RX ADMIN — OXYCODONE HYDROCHLORIDE 10 MG: 10 TABLET ORAL at 10:45

## 2020-03-12 RX ADMIN — OXYCODONE HYDROCHLORIDE 10 MG: 10 TABLET ORAL at 06:32

## 2020-03-12 RX ADMIN — GLYCOPYRROLATE 1 CAPSULE: 15.6 CAPSULE RESPIRATORY (INHALATION) at 06:29

## 2020-03-12 RX ADMIN — ALBUTEROL SULFATE 2 PUFF: 90 AEROSOL, METERED RESPIRATORY (INHALATION) at 12:55

## 2020-03-12 RX ADMIN — BUDESONIDE AND FORMOTEROL FUMARATE DIHYDRATE 2 PUFF: 160; 4.5 AEROSOL RESPIRATORY (INHALATION) at 06:29

## 2020-03-12 RX ADMIN — LORAZEPAM 1 MG: 1 TABLET ORAL at 00:53

## 2020-03-12 NOTE — DIETARY
Nutrition services: Day 1 of admit.  Jigar Kirkland is a 69 y.o. male with admitting DX of abdominal aortic aneurysm, without rupture.    Consult received for BMI <19.  Spoke with pt at bedside. Pt appeared elderly and thin, with slight squaring of shoulders. Unable to determine if slight muscle loss attributed to malnutrition or age related sarcopenia. Pt reports a good appetite now and PTA. Pt denies any recent wt loss and stated a UBW of ~ 123-125 lbs. Discussed snacks and oral nutrition supplements with pt. Pt would like fruit and yogurt added to afternoon snack. RD will adjust menu.    Assessment:  Height: 182.9 cm (6')  Weight: 56.3 kg (124 lb 1.9 oz)  Body mass index is 16.83 kg/m²., BMI classification: underweight  Diet/Intake: Regular; PO >50% for one meal and supplement thus far    Malnutrition Risk: Does not meet criteria per ASPEN guidelines at this time.    Recommendations/Plan:  1. Encourage intake of meals and snack.  2. Document intake of all meals and snack as % taken in ADLs to provide interdisciplinary communication across all shifts.   3. Monitor weight.  4. Nutrition rep will continue to see patient for ongoing meal and snack preferences.     RD will screen pt weekly. Consult RD as needed.

## 2020-03-12 NOTE — RESPIRATORY CARE
COPD EDUCATION by COPD CLINICAL EDUCATOR  3/12/2020  at  12:35 PM by Reyna Krishna, RRT     Patient interviewed by COPD education team.  Patient unable to participate in full program.  Short intervention has been conducted.  A comprehensive packet including information about COPD, treatments, and Action Plan given. Patient has spacers, home nebulizer and follow up appointment with Pulmonary 4/24/2020.

## 2020-03-12 NOTE — OR NURSING
"PT transferred from [facility] to bed, PT had c/o \"bottom\" pain, non-blanchable area on sacrum noted, PT reports having same prior to admit, mepilex placed and PT repositioned, off loading sacrum.  PT still awaiting inpatient bed.   "

## 2020-03-12 NOTE — CARE PLAN
Problem: Communication  Goal: The ability to communicate needs accurately and effectively will improve  Outcome: PROGRESSING AS EXPECTED  Note: Patient updated on POC, all questions answered at this time.     Problem: Safety  Goal: Will remain free from injury  Outcome: PROGRESSING AS EXPECTED  Note: Bed in locked and lowest position, call light within reach, bed alarm in place, patient calls appropriately for assistance

## 2020-03-12 NOTE — DISCHARGE PLANNING
This RN YARY endorsed the case to FABIOLA Delgado to follow up the orders for oxygen set up at home so Pt can have portable tank delivered to bedside. ROCKY Sullivan aware of this .

## 2020-03-12 NOTE — PROGRESS NOTES
2 RN Skin Check    Bilateral groin incisions, gauze and tegaderm dressings in place, scant shadowing on both, area outlined.  Wound to sacrum, area surrounding it is red and NOT blanching. Images uploaded. Mepilex applied, waffle mattress in place, Q2 turns.  Scattered bruising throughout.  Scab to L shin, dried over, no drainage noted, imaged uploaded.Extremely dried skin to bilateral feet.  Heels pink and blanching.  Generalized skin is loose and dry.

## 2020-03-12 NOTE — DISCHARGE PLANNING
Notified by RN CM pt is pending O2 set up. Per RN CM note, pt has a concentrator at home and was previously on service with [employer], who was bought out by [employer].     Called [employer] and spoke with Reyna. Confirmed this pt is currently on service with them for home O2. Requested they deliver a portable O2 tank for pt's d/c home. Per Reyna, tank will be delivered this afternoon.      Met with pt at bedside. Confirmed with pt he has a working concentrator at home and uses 2L at baseline, which is what he is currently on.   Called spouse who also confirmed pt has O2 concentrator at home

## 2020-03-12 NOTE — PROGRESS NOTES
Dr. Flynn paged and updated on patient's restlessness, pain, and anxiety. MD informed that all possible interventions have been tried and patient remains uncomfortable.    New order received for PRN Ativan 1 mg PO Q4 hours for anxiety.

## 2020-03-12 NOTE — PROGRESS NOTES
Entered room to update patient on discharge process, patient standing at edge of bed with urinal and heart rate on  156.  Patient was not wearing oxygen.  Got patient back into bed put 02 back on and palpated pulse to verify rate and it was indeed regular and fast.  Got vitals patient now sustaining 140 hr.  initiated call to rapid thru unit clerk while staying at bedside with patient.  Patient HR continued to trend down now high 120's so I cancelled rapid and called rapid nurse to bedside.  Patient 02 increased to 2 l NC  Now HR back to 110's.  Called MD updated on situation as patient not in distress and hr probable related to hypoxia which MD agreed.  Stated patient is back to baseline and is still able to be discharged.

## 2020-03-12 NOTE — PROGRESS NOTES
Noted in nurse report patient had allergies to Bactrim and spiriva. Noted on meds to be administered Glycopyrrolate, this is the theraputic replacement for this med.  Allergy not noted in medical chart.  Called pharmacy to see if this would cause issue, but they needed to know what his reaction was, discussed allergies with patient. Per patient it sounds like it wasn't helping so he listed it as an allergy however he did state he thought it made him sick.  I don't believe this to be a true allergy.  I called pharmacy to follow up but pharmacist was busy, gave my info to tech and issue. They will pass along.  Will follow up with this issue.

## 2020-03-12 NOTE — DISCHARGE SUMMARY
Discharge summary    Date of admission- 3-11-20    Date of discharge- 3-12-20    Admitting diagnosis -     1.  Abdominal aortic aneurysm  2.  Left inguinal hernia    Procedures performed in the hospital    1.  Endovascular repair of abdominal aortic aneurysm  2.  Left inguinal hernia repair with mesh  3.  Exposure bilateral common femoral arteries for delivery of endoprosthesis  4.  Catheter placement aorta x2  5.  Aortogram  6.  Placement of proximal aortic cuff    Hospital course-     Patient was admitted to the hospital electively for endovascular repair of his abdominal aortic aneurysm as well as repair of a left inguinal hernia.  Patient underwent the above-mentioned procedures without complication.  Patient's incisions remain clean dry and intact postoperatively and he had good pain control.  Patient will be discharged home today follow-up my office in 1 week.    Patient was instructed to resume all of his home previous medications.    Ced Webster MD

## 2020-03-12 NOTE — DISCHARGE INSTRUCTIONS
Discharge Instructions    Discharged to home by car with relative. Discharged via wheelchair, hospital escort: Yes.  Special equipment needed: Not Applicable    Be sure to schedule a follow-up appointment with your primary care doctor or any specialists as instructed.     Discharge Plan:   Diet Plan: Discussed  Activity Level: Discussed  Smoking Cessation Offered: Patient Refused  Confirmed Follow up Appointment: Patient to Call and Schedule Appointment  Confirmed Symptoms Management: Discussed  Medication Reconciliation Updated: Yes  Influenza Vaccine Indication: Not indicated: Previously immunized this influenza season and > 8 years of age    I understand that a diet low in cholesterol, fat, and sodium is recommended for good health. Unless I have been given specific instructions below for another diet, I accept this instruction as my diet prescription.   Other diet: as tolerated    Special Instructions: Please leave your dressing on for 48 hours  Remove afterwards and you may shower after removing dressings  Hernia Repair  Care After  These instructions give you information on caring for yourself after your procedure. Your doctor may also give you more specific instructions. Call your doctor if you have any problems or questions after your procedure.  HOME CARE   · You may have changes in your poops (bowel movements).  · You may have loose or watery poop (diarrhea).  · You may be not able to poop.  · Your bowels will slowly get back to normal.  · Do not eat any food that makes you sick to your stomach (nauseous). Eat small meals 4 to 6 times a day instead of 3 large ones.  · Do not drink pop. It will give you gas.  · Do not drink alcohol.  · Do not lift anything heavier than 10 pounds. This is about the weight of a gallon of milk.  · Do not do anything that makes you very tired for at least 6 weeks.  · Do not get your wound wet for 2 days.  · You may take a sponge bath during this time.  · After 2 days you may  take a shower. Gently pat your surgical cut (incision) dry with a towel. Do not rub it.  · For men: You may have been given an athletic supporter (scrotal support) before you left the hospital. It holds your scrotum and testicles closer to your body so there is no strain on your wound. Wear the supporter until your doctor tells you that you do not need it anymore.  GET HELP RIGHT AWAY IF:  · You have watery poop, or cannot poop for more than 3 days.  · You feel sick to your stomach or throw up (vomit) more than 2 or 3 times.  · You have temperature by mouth above 102° F (38.9° C).  · You see redness or puffiness (swelling) around your wound.  · You see yellowish white fluid (pus) coming from your wound.  · You see a bulge or bump in your lower belly (abdomen) or near your groin.  · You develop a rash, trouble breathing, or any other symptoms from medicines taken.  MAKE SURE YOU:  · Understand these instructions.  · Will watch your condition.  · Will get help right away if your are not doing well or get worse.  Document Released: 11/30/2009 Document Revised: 03/11/2013 Document Reviewed: 11/30/2009  ExitCare® Patient Information ©2014 [employer].    Abdominal Aortic Aneurysm Endograft Repair  Abdominal aortic aneurysm endograft repair is a surgery to fix an aortic aneurysm in the abdominal area. An aneurysm is a weak or damaged part of an artery wall that bulges out from the normal force of blood pumping through the body. An abdominal aortic aneurysm is an aneurysm that happens in the lower part of the aorta, which is the main artery of the body.  The repair is often done if the aneurysm gets so large that it might burst (rupture). A ruptured aneurysm would cause bleeding inside the body that could put a person's life in danger. Before that happens, this procedure is needed to fix the problem.  The procedure may also be done if the aneurysm causes symptoms such as pain in the back, abdomen, or side. In this  procedure, a tube made of fabric and metal mesh (endograft or stent-graft) is placed in the weak part of the aorta to repair it.  Tell a health care provider about:  · Any allergies you have.  · All medicines you are taking, including vitamins, herbs, eye drops, creams, and over-the-counter medicines.  · Any problems you or family members have had with anesthetic medicines.  · Any blood disorders you have.  · Any surgeries you have had.  · Any medical conditions you have.  · Whether you are pregnant or may be pregnant.  What are the risks?  Generally, this is a safe procedure. However, problems may occur, including:  · Infection of the graft or incision area.  · Bleeding during the procedure or from the incision site.  · Allergic reactions to medicines.  · Damage to other structures or organs.  · Blood leaking out around the endograft.  · The endograft moving from where it was placed during surgery.  · Blood flow through the graft becoming blocked.  · Blood clots.  · Kidney problems.  · Blood flow to the legs becoming blocked (rare).  · Rupture of the aorta even after the endograft repair is a success (rare).  What happens before the procedure?  Staying hydrated   Follow instructions from your health care provider about hydration, which may include:  · Up to 2 hours before the procedure - you may continue to drink clear liquids, such as water, clear fruit juice, black coffee, and plain tea.  Eating and drinking restrictions   Follow instructions from your health care provider about eating and drinking, which may include:  · 8 hours before the procedure - stop eating heavy meals or foods such as meat, fried foods, or fatty foods.  · 6 hours before the procedure - stop eating light meals or foods, such as toast or cereal.  · 6 hours before the procedure - stop drinking milk or drinks that contain milk.  · 2 hours before the procedure - stop drinking clear liquids.  Medicines  · Ask your health care provider  about:  ¨ Changing or stopping your regular medicines. This is especially important if you are taking diabetes medicines or blood thinners.  ¨ Taking medicines such as aspirin and ibuprofen. These medicines can thin your blood. Do not take these medicines before your procedure if your health care provider instructs you not to.  · You may be given antibiotic medicine to help prevent infection.  General instructions  · You may need to have blood tests, a test to check heart rhythm (electrocardiogram, or ECG), or a test to check blood flow (angiogram) before the surgery.  · Imaging tests will be done to check the size and location of the aneurysm. These tests could include an ultrasound, a CT scan, or an MRI.  · Do not use any products that contain nicotine or tobacco--such as cigarettes and e-cigarettes--for as long as possible before the surgery. If you need help quitting, ask your health care provider.  · Ask your health care provider how your surgical site will be marked or identified.  · Plan to have someone take you home from the hospital or clinic.  What happens during the procedure?  · To reduce your risk of infection:  ¨ Your health care team will wash or sanitize their hands.  ¨ Your skin will be washed with soap.  ¨ Hair may be removed from the surgical area.  · An IV tube will be inserted into one of your veins.  · You will be given one or more of the following:  ¨ A medicine to help you relax (sedative).  ¨ A medicine to numb the area (local anesthetic).  ¨ A medicine to make you fall asleep (general anesthetic).  ¨ A medicine that is injected into an area of your body to numb everything below the injection site (regional anesthetic).  · During the surgery:  ¨ Small incisions or a puncture will be made on one or both sides of the groin. Long, thin tubes (catheters) will be passed through the opening, put into the artery in your thigh, and moved up into the aneurysm in the aorta.  ¨ The health care provider  will use live X-ray pictures to guide the endograft through the catheter to the place where the aneurysm is.  ¨ The endograft will be released to seal off the aneurysm and to line the aorta. It will keep blood from flowing into the aneurysm and will help keep it from rupturing. The endograft will stay in place and will not be taken out.  ¨ X-rays will be used to check where the endograft is placed and to make sure that it is where it should be.  ¨ The catheter will be taken out, and the incision will be closed with stitches (sutures).  The procedure may vary among health care providers and hospitals.  What happens after the procedure?  · Your blood pressure, heart rate, breathing rate, and blood oxygen level will be monitored until the medicines you were given have worn off.  · You will need to lie flat for a number of hours. Bending your legs can cause them to bleed and swell.  · You will then be urged to get up and move around a number of times each day and to slowly become more active.  · You will be given medicines to control pain.  · Certain tests may be done after your procedure to check how well the endograft is working and to check its placement.  · Do not drive for 24 hours if you received a sedative.  This information is not intended to replace advice given to you by your health care provider. Make sure you discuss any questions you have with your health care provider.  Document Released: 05/06/2010 Document Revised: 07/07/2017 Document Reviewed: 03/13/2017  [employer] Interactive Patient Education © 2017 [employer]      · Is patient discharged on Warfarin / Coumadin?   No     Depression / Suicide Risk    As you are discharged from this [facility] facility, it is important to learn how to keep safe from harming yourself.    Recognize the warning signs:  · Abrupt changes in personality, positive or negative- including increase in energy   · Giving away possessions  · Change in eating patterns-  significant weight changes-  positive or negative  · Change in sleeping patterns- unable to sleep or sleeping all the time   · Unwillingness or inability to communicate  · Depression  · Unusual sadness, discouragement and loneliness  · Talk of wanting to die  · Neglect of personal appearance   · Rebelliousness- reckless behavior  · Withdrawal from people/activities they love  · Confusion- inability to concentrate     If you or a loved one observes any of these behaviors or has concerns about self-harm, here's what you can do:  · Talk about it- your feelings and reasons for harming yourself  · Remove any means that you might use to hurt yourself (examples: pills, rope, extension cords, firearm)  · Get professional help from the community (Mental Health, Substance Abuse, psychological counseling)  · Do not be alone:Call your Safe Contact- someone whom you trust who will be there for you.  · Call your local CRISIS HOTLINE 585-3062 or 355-140-8050  · Call your local Children's Mobile Crisis Response Team [place] (273) 180-1627 or www.[employer]  · Call the toll free National Suicide Prevention Hotlines   · National Suicide Prevention Lifeline 797-188-EDWQ (0947)  · National Hope Line Network 800-SUICIDE (706-1070)

## 2020-03-12 NOTE — PROGRESS NOTES
Report received from PACU RN. Patient arrived to the floor in the patient bed.     Pt A&O x 4.     Vitals: /83   Pulse (!) 116   Temp 37.2 °C (98.9 °F) (Temporal)   Resp 20   Ht 1.829 m (6')   Wt 56.3 kg (124 lb 1.9 oz)   SpO2 93%   BMI 16.83 kg/m²      Pt rates pain 7 out of 10. Medicated per MAR.      Neuro: COWAN. Denies new onset of numbness/ tingling. Patient has baseline numbness/tingling in BLE.     Cardiac: Denies new onset of chest pain.     Vascular: Pulses 2+ BUE, post-tibial pulses heard by doppler, unable to hear pedal pulses on BLE. No edema noted.     Respiratory: Lungs sound diminished to auscultation. On 2L of O2, baseline per patient.  on, satting in 90's. Denies SOB.     GI: Abdomen soft, with intermittent pressure, and tender. Normoactive bowel sounds, - flatus, - BM post surgically, last BM 3/10/2020 per patient PTA. Denies nausea/ vomiting.     : Pt voiding adequately.      MSK: Pt on bed rest for 6 hours, turns self independently in bed, tolerating well.     Integumentary: Bilateral groin incisions, gauze and tape, scant shadowing on both, outlined with marker.      Labs noted.     Fall precautions in place: Bed locked in lowest position, Upper bed rails up, treaded socks in place, personal belongings within reach, call light within reach, appropriate mobility signs in place, - bed alarm. Pt calls appropriately.      Pt updated on POC.

## 2020-03-12 NOTE — OR NURSING
Report given to Martha RN, PT reports being very hungry, given ham and cheese sandwich and cup of fruit.  Waffle mattress pad under PT for comfort.

## 2020-03-12 NOTE — FACE TO FACE
Face to Face Note  -  Durable Medical Equipment    Ced Flynn M.D. - NPI: [id number]  I certify that this patient is under my care and that they had a durable medical equipment(DME)face to face encounter by myself that meets the physician DME face-to-face encounter requirements with this patient on:    Date of encounter:   Patient:                    MRN:                       YOB: 2021  Jigar Kirkland  [id number]  1950     The encounter with the patient was in whole, or in part, for the following medical condition, which is the primary reason for durable medical equipment:  COPD    I certify that, based on my findings, the following durable medical equipment is medically necessary:Continuous Oxygen  Oxygen.    HOME O2 Saturation Measurements:(Values must be present for Home Oxygen orders)      Oxygen Saturation 78% on room air    ,     ,         My Clinical findings support the need for the above equipment due to:  Hypoxia    Supporting Symptoms: COPD with hypoxia

## 2020-03-12 NOTE — CARE PLAN
"  Problem: Bowel/Gastric:  Goal: Normal bowel function is maintained or improved  Outcome: PROGRESSING SLOWER THAN EXPECTED  Goal: Will not experience complications related to bowel motility  Outcome: PROGRESSING SLOWER THAN EXPECTED  No BM     Problem: Knowledge Deficit  Goal: Knowledge of disease process/condition, treatment plan, diagnostic tests, and medications will improve  Outcome: PROGRESSING SLOWER THAN EXPECTED  Goal: Knowledge of the prescribed therapeutic regimen will improve  Outcome: PROGRESSING SLOWER THAN EXPECTED   Patient with \"allergies\" but appears to be that he doesn't believe medications to be working  Problem: Respiratory:  Goal: Respiratory status will improve  Outcome: PROGRESSING SLOWER THAN EXPECTED   Patient copd requiring 02, tachycardia, encourage us of IS and instructed on use at bedside  "

## 2020-03-12 NOTE — PROGRESS NOTES
Spiritual Care Note    Patient's Name: Jigar Kirkland   MRN: [id number]    YOB: 1950   Age and Gender: 69 y.o. male   Service Area: [facility]   Room (and Bed): [place] [address]   Ethnicity or Nationality: White   Primary Language: English   Yazidi/Spiritual preference: None   Place of Residence: [facility]   Family/Friends/Others Present: No   Clinical Team Present: No   Medical Diagnosis(-es)/Procedure(s): Abdominal Aneurysm   Code Status: Full Code    Date of Admission: 3/11/2020   Length of Stay: 1 days        Spiritual Care Provider Information    Name of Spiritual Care Provider:   Danielle Lopez  Title of Spiritual Care Provider:     Phone Number:     [phone]  E-mail:      [email or web address]  Total Time:      10 minutes      Spiritual Screen Results    Gen Nursing    Is your spiritual health or inner well-being important to you as you cope with your medical condition?: No  Would you like to receive a visit from our Spiritual Care team or your own Scientologist or spiritual leader?: No  Was spiritual care education provided to the patient?: Declined     Palliative Care    Was spiritual care education provided to the patient?: Declined      Encounter/Request Information    Visited With: Patient  Nature of the Visit: Initial, On shift  General Visit: Yes  Surgical Visit: Pre-op  Referral From/ Origin of Request: Epic nursing      Religous Needs/Values    Yazidi Needs Visit  Yazidi Needs: Prayer    Spiritual Assessment     Observations/Symptoms: Accepting    Interacton/Conversation: PT requested prayer    Assessment: Need   Need: Seeking Spiritual Assistance and Support    Interventions: non-anxious presence, active listening, prayer    Outcomes: Spiritual Comfort, Value/Dignity/Respect    Plan: No Further Visits    Notes:    Thank you for allowing Spiritual Care to support this patient and family. Contact [phone] for additional assistance, changes in PT status, or with any  questions/concerns.

## 2020-03-12 NOTE — OR NURSING
PT assisted up to restroom, voided in toilet x1 large amount of clear yellow urine.  PT gait steady.  PT B/L groin incisions remain soft, CDI.  PT drinking ice water, chewing gum and playing his poker game.  Still awaiting room.

## 2020-03-12 NOTE — DISCHARGE PLANNING
This RN CM spoke met with Pt at bedside and per Pt he has no portable oxygen tank at home but he has a concentrator. Spoke with Rosemary, Pt's wife who state that this is true and that [employer], the Pt's old DME agency for oxygen is out of business now. I contacted the number she gave me and it is not a working number.     Pt and his wife requested that we send a new referral to another DME. This RN CM requested Dr Flynn to put in the order/referral for DME oxygen set up at home and to put in Face to Face. Choice form for DME oxygen faxed to Lana DANIELS.      This RN CM spoke with Dr Flynn, Per Dr Flynn, Pt does not need oxygen at home. , Pt does not look hypoxic and Pt can get the order for oxygen set up at home thru his PCP. Per pt's wife she would call [facility]  to establish contact with a PCP.     1325 Per Mariam, Unit Manager she reached out to Dr Flynn and Dr Flynn he will put in the order and Face to Face.  Requested Dr Flynn to add saturation level, Continuous in the DME home oxygen setup order via tiger text.

## 2020-03-12 NOTE — PROGRESS NOTES
Made patient aware he was pending discharge today. Inquired about his portable home oxygen tank, informed him that when his spouse comes up she must bring that with her.  Patient stated he does not have one.  Patient is on home 02 at 2lnc baseline.  Inquired who provides his DME he stated key, however the company sold his contract to another company and he is unaware who.  I looked up company, the number is disconnected.  Discussed with social work as we cannot discharge someone on supplement o2 without a tank.  They are following up with this issue.

## 2020-03-13 NOTE — PROGRESS NOTES
Discharging patient home per physician order. Discharge with family, demonstrated understanding of discharge instructions, follow-up appointments, home medications,and  home care for surgical wounds. Patient received  No scripts.  Ambulating without assistance, voiding without difficulty, pain well controlled, tolerating oral medications, oxygen saturation greater then 90 %.  Tolerating diet, All questions answered and belongings with patient at discharge.  Patient received a portable oxygen tank and has a condenser at home.  I instructed patient on use of portable 02, patient able to demonstrate how to use 02.

## 2020-03-23 ENCOUNTER — TELEPHONE (OUTPATIENT)
Dept: VASCULAR LAB | Facility: MEDICAL CENTER | Age: 70
End: 2020-03-23

## 2020-03-23 NOTE — TELEPHONE ENCOUNTER
Tried to call patient to remind him he is due for his 1 month scan post aneurysm repair. Patient did not answer and vm box is full

## 2020-03-30 ENCOUNTER — TELEPHONE (OUTPATIENT)
Dept: VASCULAR LAB | Facility: MEDICAL CENTER | Age: 70
End: 2020-03-30

## 2020-03-30 NOTE — TELEPHONE ENCOUNTER
Called pt to remind that the CT is due for surveillance. Scheduling office and our office phone numbers provided.

## 2020-03-31 ENCOUNTER — APPOINTMENT (OUTPATIENT)
Dept: VASCULAR LAB | Facility: MEDICAL CENTER | Age: 70
End: 2020-03-31
Payer: MEDICARE

## 2020-04-06 ENCOUNTER — TELEPHONE (OUTPATIENT)
Dept: PULMONOLOGY | Facility: HOSPICE | Age: 70
End: 2020-04-06

## 2020-04-06 DIAGNOSIS — J44.9 CHRONIC OBSTRUCTIVE PULMONARY DISEASE, UNSPECIFIED COPD TYPE (HCC): ICD-10-CM

## 2020-04-06 RX ORDER — BUDESONIDE AND FORMOTEROL FUMARATE DIHYDRATE 160; 4.5 UG/1; UG/1
2 AEROSOL RESPIRATORY (INHALATION) 2 TIMES DAILY
Qty: 1 INHALER | Refills: 5 | Status: SHIPPED | OUTPATIENT
Start: 2020-04-06 | End: 2020-09-22 | Stop reason: SDUPTHER

## 2020-04-06 RX ORDER — ALBUTEROL SULFATE 90 UG/1
2 AEROSOL, METERED RESPIRATORY (INHALATION) EVERY 4 HOURS PRN
Qty: 1 INHALER | Refills: 5 | Status: SHIPPED | OUTPATIENT
Start: 2020-04-06 | End: 2020-09-22 | Stop reason: SDUPTHER

## 2020-04-06 NOTE — TELEPHONE ENCOUNTER
Pt called and was requesting a RF on his Ventolin inhaler and Symbicort    Have we ever prescribed this med? Yes.  If yes, what date? 01/24/2020(harmeet)    Last OV: 01/24/2020 with Iman LEIGH    Next OV: 04/24/2020 with Iman LEIGH    DX: Chronic obstructive pulmonary disease, unspecified COPD type (HCC) (J44.9    Medications:   Requested Prescriptions     Pending Prescriptions Disp Refills   • albuterol (VENTOLIN HFA) 108 (90 Base) MCG/ACT Aero Soln inhalation aerosol 1 Inhaler 2     Sig: Inhale 2 Puffs by mouth every four hours as needed for Shortness of Breath.

## 2020-04-06 NOTE — TELEPHONE ENCOUNTER
Pt called in regards to prescription request. Confirmed both prescriptions were signed off on and sent to the pharmacy

## 2020-04-10 ENCOUNTER — TELEPHONE (OUTPATIENT)
Dept: VASCULAR LAB | Facility: MEDICAL CENTER | Age: 70
End: 2020-04-10

## 2020-04-10 NOTE — TELEPHONE ENCOUNTER
1X- Left voicemail for patient to schedule  (CTA)  due for surveillance in May, Phone number for scheduling provided.

## 2020-04-20 ENCOUNTER — TELEPHONE (OUTPATIENT)
Dept: PULMONOLOGY | Facility: HOSPICE | Age: 70
End: 2020-04-20

## 2020-04-20 NOTE — TELEPHONE ENCOUNTER
Patient called requesting cancelling upcoming appt due to pandemic. I informed the patient also scheduled to see dewey Wick informed no longer with [facility]. Patient requesting reschedule with [facility] physician. Informed appt same 7/31/2020 changed provider to dr. Napier per last Ov with dewey Wick PFT and echo. Pt scheduled for PFt same day before appt time with Dr. Napier. Encouraged patient complete echo order was mailed to address on file and instructions to the PFT mailed as well.

## 2020-04-24 ENCOUNTER — APPOINTMENT (OUTPATIENT)
Dept: PULMONOLOGY | Facility: HOSPICE | Age: 70
End: 2020-04-24
Payer: MEDICARE

## 2020-05-18 ENCOUNTER — APPOINTMENT (OUTPATIENT)
Dept: VASCULAR LAB | Facility: MEDICAL CENTER | Age: 70
End: 2020-05-18
Payer: MEDICARE

## 2020-06-19 ENCOUNTER — TELEPHONE (OUTPATIENT)
Dept: VASCULAR LAB | Facility: MEDICAL CENTER | Age: 70
End: 2020-06-19

## 2020-06-19 NOTE — TELEPHONE ENCOUNTER
"Spoke with patient to remind him he is overdue for his imaging per [facility] protocol. I stressed the importance of this imaging. Patient states that he is refusing imaging for this and has \"far more important things going on\". Let patient know we would reach out to the Doctor and APN to let them know he will be refusing the imaging. Patient agreed with plan.   "

## 2020-07-14 ENCOUNTER — APPOINTMENT (OUTPATIENT)
Dept: VASCULAR LAB | Facility: MEDICAL CENTER | Age: 70
End: 2020-07-14
Payer: MEDICARE

## 2020-07-31 ENCOUNTER — NON-PROVIDER VISIT (OUTPATIENT)
Dept: PULMONOLOGY | Facility: HOSPICE | Age: 70
End: 2020-07-31
Attending: NURSE PRACTITIONER
Payer: MEDICARE

## 2020-07-31 VITALS — HEIGHT: 71 IN | WEIGHT: 122 LBS | BODY MASS INDEX: 17.08 KG/M2

## 2020-07-31 DIAGNOSIS — J44.9 CHRONIC OBSTRUCTIVE PULMONARY DISEASE, UNSPECIFIED COPD TYPE (HCC): ICD-10-CM

## 2020-07-31 DIAGNOSIS — R14.0 BLOATING: ICD-10-CM

## 2020-07-31 DIAGNOSIS — R09.02 HYPOXIA: ICD-10-CM

## 2020-07-31 PROCEDURE — 94729 DIFFUSING CAPACITY: CPT | Performed by: INTERNAL MEDICINE

## 2020-07-31 PROCEDURE — 94726 PLETHYSMOGRAPHY LUNG VOLUMES: CPT | Performed by: INTERNAL MEDICINE

## 2020-07-31 PROCEDURE — 94060 EVALUATION OF WHEEZING: CPT | Performed by: INTERNAL MEDICINE

## 2020-07-31 ASSESSMENT — PULMONARY FUNCTION TESTS
FEV1/FVC: 38
FEV1/FVC: 39
FVC_PREDICTED: 4.36
FEV1: 1.1
FEV1/FVC_PERCENT_PREDICTED: 51
FEV1/FVC_PREDICTED: 76
FEV1/FVC_PERCENT_CHANGE: 100
FEV1/FVC_PERCENT_LLN: 63
FEV1_PERCENT_PREDICTED: 33
FEV1_PERCENT_CHANGE: 5
FEV1_PERCENT_CHANGE: 5
FEV1/FVC_PERCENT_PREDICTED: 75
FEV1/FVC_PERCENT_PREDICTED: 50
FEV1_PERCENT_PREDICTED: 31
FVC_PERCENT_PREDICTED: 65
FEV1_PREDICTED: 3.28
FEV1/FVC: 38.6
FVC: 2.85
FVC_LLN: 3.64
FEV1/FVC_PERCENT_PREDICTED: 50
FEV1/FVC_PERCENT_CHANGE: 0
FEV1_LLN: 2.74
FEV1/FVC_PERCENT_PREDICTED: 51
FEV1: 1.04
FVC: 2.7
FVC_PERCENT_PREDICTED: 61
FEV1/FVC: 39

## 2020-07-31 ASSESSMENT — FIBROSIS 4 INDEX: FIB4 SCORE: 4.32

## 2020-07-31 NOTE — PROCEDURES
Technician: Terri Lara RRT   Good patient effort & cooperation.  The results of this test meet the ATS/ERS standards for acceptability & reproducibility.  Test was performed on the [employer] Body Plethysmograph-Elite DX system.  Predicted equations for Spirometry are GLI-2012, ITS for lung volumes, and GLI-2017 for DLCO.  The DLCO was uncorrected for Hgb.  A bronchodilator of Ventolin HFA -2puffs via spacer administered.  DLCO performed during dilation period. IVC is less than 90%.    Interpretation:   This exam is performed with a primary diagnosis of COPD to help establish a pre-test probability of the patient’s diagnostic findings.     The Flow Volume Loop is of sufficient quality and the volume-time graph demonstrates an adequate exhalation to provide a confident interpretation. Review of the flow-volume loop reveals a concave pattern suggestive of obstruction. The volume-time loop fails to demonstrate a plateau which may indicate an incomplete exhalation phase.    The FEV1/FVC ratio is reduced with an abnormally reduced FEV1 and FVC based on predicted values. This pattern is consistent with an obstructive ventilatory defect which is severe by percent predicted FEV1. After administration of 4 puffs of albuterol (360mcg), the patient did not achieve a significant bronchodilator response (12% and 200 ml in FEV1 or FVC).     Total lung capacity and residual volume are elevated indicating air trapping and hyperinflation. There is a mild (60-79% percent predicted) reduction in DLCO after correction for alveolar volume.      Impression:   1. Severe airflow obstruction   2. Air trapping    3. Mild reduction in diffusion capacity

## 2020-08-06 ENCOUNTER — TELEPHONE (OUTPATIENT)
Dept: PULMONOLOGY | Facility: HOSPICE | Age: 70
End: 2020-08-06

## 2020-08-07 ENCOUNTER — OFFICE VISIT (OUTPATIENT)
Dept: PULMONOLOGY | Facility: HOSPICE | Age: 70
End: 2020-08-07
Payer: MEDICARE

## 2020-08-07 VITALS
OXYGEN SATURATION: 95 % | SYSTOLIC BLOOD PRESSURE: 130 MMHG | HEART RATE: 78 BPM | HEIGHT: 71 IN | DIASTOLIC BLOOD PRESSURE: 88 MMHG | WEIGHT: 123.38 LBS | BODY MASS INDEX: 17.27 KG/M2

## 2020-08-07 DIAGNOSIS — J44.9 CHRONIC OBSTRUCTIVE PULMONARY DISEASE, UNSPECIFIED COPD TYPE (HCC): ICD-10-CM

## 2020-08-07 DIAGNOSIS — I71.9 AORTIC ANEURYSM WITHOUT RUPTURE, UNSPECIFIED PORTION OF AORTA (HCC): ICD-10-CM

## 2020-08-07 DIAGNOSIS — G47.34 NOCTURNAL OXYGEN DESATURATION: ICD-10-CM

## 2020-08-07 DIAGNOSIS — R09.02 HYPOXIA: ICD-10-CM

## 2020-08-07 PROCEDURE — 99214 OFFICE O/P EST MOD 30 MIN: CPT | Performed by: INTERNAL MEDICINE

## 2020-08-07 ASSESSMENT — FIBROSIS 4 INDEX: FIB4 SCORE: 4.32

## 2020-08-07 NOTE — PATIENT INSTRUCTIONS
Jigar comes in today with his wife of 47 years, has severe COPD and diminishing function.  He is frustrated in that he can have fairly sudden shortness of breath, this is been documented several times and I suspect this relates to the fact that his mid flows are only 14% predicted.  His oxygen transfer has dropped from last year, from 48% down to 42%.  His weight is diminishing, he has cachexia related to his COPD.  He eats is much as possible including ice cream and high caloric intake but without success.    He does not have significant bronchospasm, does have Symbicort and DuoNeb available.  His 45-pack-year smoking history stopped 6 years ago.    Recently he underwent aortic stent, on leaving the hospital was noted to require oxygen and at our last visit we could not qualify him for daytime oxygen, I am going to recheck that right now to see if we can provide him with exercise oxygen and perhaps even a portable concentrator    Addendum to dictation, walking oximetry demonstrates a need for supplemental oxygen, orders placed and he will obtain this prescription form from [employer], we have the information available to complete this so that he can obtain the portable concentrator.

## 2020-08-07 NOTE — TELEPHONE ENCOUNTER
Pt scheduled to see Dr. Napier 8/7/2020 prior appt with dewey Wick echo was order time of visit. No results. I lvm to the patient to confirm if echo completed if so where, if not he can either keep appt to go over PFT results or reschedule.

## 2020-08-07 NOTE — TELEPHONE ENCOUNTER
Pt called back and informed that he did not complete the echo as he forgot but would still like to come in to review PFT with LAUREANO Napier MD. Pt screened and advised to be here at 2:30pm.

## 2020-08-07 NOTE — PROCEDURES
Multi-Ox Readings  Multi Ox #1 Room air   O2 sat % at rest 94   O2 sat % on exertion 88   O2 sat average on exertion     Multi Ox #2 2 LPM   O2 sat % at rest 92   O2 sat % on exertion 94   O2 sat average on exertion       Oxygen Use 2   Oxygen Frequency With exertion and nocturnal   Duration of need     Is the patient mobile within the home?     CPAP Use?     BIPAP Use?     Servo Titration

## 2020-08-07 NOTE — PROGRESS NOTES
Jigar Kirkland is a 70 y.o. male here for severe COPD on oxygen. Patient was referred by his primary care.    History of Present Illness:    Jigar comes in today with his wife of 47 years, has severe COPD and diminishing function.  He is frustrated in that he can have fairly sudden shortness of breath, this is been documented several times and I suspect this relates to the fact that his mid flows are only 14% predicted.  His oxygen transfer has dropped from last year, from 48% down to 42%.  His weight is diminishing, he has cachexia related to his COPD.  He eats is much as possible including ice cream and high caloric intake but without success.    He does not have significant bronchospasm, does have Symbicort and DuoNeb available.  His 45-pack-year smoking history stopped 6 years ago.    Recently he underwent aortic stent, on leaving the hospital was noted to require oxygen and at our last visit we could not qualify him for daytime oxygen, I am going to recheck that right now to see if we can provide him with exercise oxygen and perhaps even a portable concentrator    Addendum to dictation, he does qualify for oxygen walking, he wants to have the form from [employer] submitted to us, we have the information needed for the portable concentrator and qualifications.  Constitutional ROS: No unexpected change in weight, No unexplained fevers  Eyes: No change in vision or blurring or double vision  Mouth/Throat ROS: No sore throat, No recent change in voice or hoarseness  Pulmonary ROS: See present history for pertinent positives  Cardiovascular ROS: No chest pain to suggest acute coronary syndrome  Gastrointestinal ROS: No abdominal pain to suggest peptic disease  Musculoskeletal/Extremities ROS: no acute artritis or unusual swelling  Hematologic/Lymphatic ROS: No easy bleeding or unusual lymph node swelling  Neurologic ROS: No new or unusual weakness  Psychiatric ROS: No hallucinations  Allergic/Immunologic: No   urticaria or allergic rash      Current Outpatient Medications   Medication Sig Dispense Refill   • albuterol (VENTOLIN HFA) 108 (90 Base) MCG/ACT Aero Soln inhalation aerosol Inhale 2 Puffs by mouth every four hours as needed for Shortness of Breath. 1 Inhaler 5   • budesonide-formoterol (SYMBICORT) 160-4.5 MCG/ACT Aerosol Inhale 2 Puffs by mouth 2 Times a Day. 1 Inhaler 5   • Ca Carbonate-Mag Hydroxide (ROLAIDS PO) Take 1 Tab by mouth 1 time daily as needed.     • ipratropium-albuterol (DUONEB) 0.5-2.5 (3) MG/3ML nebulizer solution 3 mL by Nebulization route every four hours as needed for Shortness of Breath (Wheezing). 120 Vial 5   • polyethylene glycol 3350 (MIRALAX) Powder MIX 17G IN LIQUID OF CHOICE AND DRINK BY MOUTH ONCE DAILY AS DIRECTED 527 g 0   • oxycodone immediate release (ROXICODONE) 10 MG immediate release tablet Take 5-10 mg by mouth every 8 hours as needed.     • chlorhexidine (PERIDEX) 0.12 % Solution Take 15 mL by mouth every evening.     • Umeclidinium Bromide (INCRUSE ELLIPTA) 62.5 MCG/INH AEROSOL POWDER, BREATH ACTIVATED Inhale 1 Puff by mouth every day. 1 month sample (Patient not taking: Reported on 2020) 2 Each 0   • predniSONE (DELTASONE) 10 MG Tab Take 30mg x 3 days, then take 20mg x 3 days, then take 10mg x 3 days, with food, then discontinue. (Patient not taking: Reported on 2020) 18 Tab 2     No current facility-administered medications for this visit.        Social History     Tobacco Use   • Smoking status: Former Smoker     Packs/day: 1.00     Years: 50.00     Pack years: 50.00     Types: Cigarettes     Quit date: 2014     Years since quittin.0   • Smokeless tobacco: Never Used   Substance Use Topics   • Alcohol use: Not Currently     Alcohol/week: 0.0 oz     Comment: No alcohol for about 10 years.   • Drug use: No        Past Medical History:   Diagnosis Date   • Aortic aneurysm (HCC)    • Asthma 03/10/2020    Inhaler use daily.   • Breath shortness     wear 2 L 02  "at night and as needed during the day   • Bronchitis    • Chronic airway obstruction, not elsewhere classified    • Cluster headaches     on roxicodone    • COPD (chronic obstructive pulmonary disease) ([facility])    • Dental disorder 03/10/2020    \"Bad missing teeth\"   • Emphysema of lung ([facility]) 03/10/2020    2L O2 PRN   • Heart burn    • Indigestion        Past Surgical History:   Procedure Laterality Date   • AAA WITH STENT GRAFT  3/11/2020    Procedure: INSERTION, ENDOVASCULAR STENT GRAFT, AORTA, ABDOMINAL;  Surgeon: Ced Flynn M.D.;  Location: SURGERY [facility];  Service: General   • INGUINAL HERNIA REPAIR Left 3/11/2020    Procedure: REPAIR, HERNIA, INGUINAL;  Surgeon: Ced Flynn M.D.;  Location: SURGERY [facility];  Service: General   • KNEE ARTHROSCOPY Left 1997   • KNEE ORIF Left 1997       Allergies: Bactrim [sulfamethoxazole w-trimethoprim] and Spiriva    Family History   Problem Relation Age of Onset   • Cancer Mother         Type unspec.   • Cancer Father         Type unspec.       Physical Examination    Vitals:    08/07/20 1422 08/07/20 1429   Height:  1.803 m (5' 11\")   Weight:  56 kg (123 lb 6 oz)   Weight % change since last entry.:  0 %   BP:  130/88   Pulse:  78   BMI (Calculated):  17.21   O2 sat % room air: 95 %        General Appearance: alert, no distress  Skin: Skin color, texture, turgor normal. No rashes or lesions.  Eyes: negative  Oropharynx: Lips, mucosa, and tongue normal.  Oropharynx moist and without lesion  Lungs: positive findings: Very diminished but clear, minimal forced expiratory wheeze  Heart: negative. RRR without murmur, gallop, or rubs.  No ectopy.  Abdomen: Abdomen soft, non-tender. . No masses,  No organomegaly  Extremities:  No deformities, edema, or skin discoloration  Joints: No acute arthritis  Peripheral Pulses:perfused  Neurologic: intact grossly  Marked cachexia, low body mass, generally weak      Imaging: None today    PFTS: Severe obstruction, oxygen " transfer has diminished further to 42% and FEV1 and mid flows are extremely reduced, mid flows at 14% predicted      Assessment and Plan  1. Chronic obstructive pulmonary disease, unspecified COPD type (HCC)  Qualifies for walking oxygen see entries, wants to obtain an antigen  - Multiple Oximetry; Future    2. Hypoxia  We will need oxygen nighttime and daytime exercise  - Multiple Oximetry; Future    3. Nocturnal oxygen desaturation  Noted    4. Aortic aneurysm without rupture, unspecified portion of aorta ([facility])  Recent stent      Followup in 6 months

## 2020-08-31 ENCOUNTER — APPOINTMENT (OUTPATIENT)
Dept: VASCULAR LAB | Facility: MEDICAL CENTER | Age: 70
End: 2020-08-31
Payer: MEDICARE

## 2020-09-22 DIAGNOSIS — J44.9 CHRONIC OBSTRUCTIVE PULMONARY DISEASE, UNSPECIFIED COPD TYPE (HCC): ICD-10-CM

## 2020-09-22 RX ORDER — ALBUTEROL SULFATE 90 UG/1
2 AEROSOL, METERED RESPIRATORY (INHALATION) EVERY 4 HOURS PRN
Qty: 3 EACH | Refills: 3 | Status: SHIPPED | OUTPATIENT
Start: 2020-09-22 | End: 2021-09-09 | Stop reason: SDUPTHER

## 2020-09-22 RX ORDER — BUDESONIDE AND FORMOTEROL FUMARATE DIHYDRATE 160; 4.5 UG/1; UG/1
2 AEROSOL RESPIRATORY (INHALATION) 2 TIMES DAILY
Qty: 3 EACH | Refills: 3 | Status: SHIPPED | OUTPATIENT
Start: 2020-09-22 | End: 2021-10-29 | Stop reason: SDUPTHER

## 2020-09-22 NOTE — TELEPHONE ENCOUNTER
Have we ever prescribed this med? Yes.  If yes, what date? 04/06/20 ANILA LEIGH    Last OV: 08/07/20 Dr. Napier    Next OV: 02/09/21 Dr. Napier    DX: Chronic obstructive pulmonary disease, unspecified COPD type    Medications: albuterol (VENTOLIN HFA) 108 (90 Base) MCG/ACT Aero Soln inhalation aerosol    Have we ever prescribed this med? Yes.  If yes, what date? 04/06/20 ANILA LEIGH    Last OV: 08/07/20 Dr. Napier    Next OV: 02/09/21 Dr. Napier    DX: Chronic obstructive pulmonary disease, unspecified COPD type    Medications: budesonide-formoterol (SYMBICORT) 160-4.5 MCG/ACT Aerosol

## 2020-09-29 DIAGNOSIS — J44.9 CHRONIC OBSTRUCTIVE PULMONARY DISEASE, UNSPECIFIED COPD TYPE (HCC): ICD-10-CM

## 2020-09-29 NOTE — TELEPHONE ENCOUNTER
Have we ever prescribed this med? Yes.  If yes, what date? 09/23/19 ANILA Culver APRN    Last OV: 08/07/20 Dr. Napier     Next OV: 02/09/21 Dr. Napier    DX: Chronic obstructive pulmonary disease, unspecified COPD type    Medications: ipratropium-albuterol (DUONEB) 0.5-2.5 (3) MG/3ML nebulizer solution

## 2020-09-30 RX ORDER — IPRATROPIUM BROMIDE AND ALBUTEROL SULFATE 2.5; .5 MG/3ML; MG/3ML
3 SOLUTION RESPIRATORY (INHALATION) EVERY 4 HOURS PRN
Qty: 3 EACH | Refills: 3 | Status: SHIPPED | OUTPATIENT
Start: 2020-09-30

## 2020-10-08 ENCOUNTER — TELEPHONE (OUTPATIENT)
Dept: PULMONOLOGY | Facility: HOSPICE | Age: 70
End: 2020-10-08

## 2020-10-08 NOTE — TELEPHONE ENCOUNTER
Patient calling saw Dr Napier 8/7/20. He wants an antibiotic and prednisone. Patient states he is short of breath and had always been given these medications in the past.    Patient denies fever, denies any nasal congestion, or coughing up any discoloration.      Please advise as to whether you would order the medications he is requesting, thank you.

## 2020-10-09 ENCOUNTER — TELEPHONE (OUTPATIENT)
Dept: PULMONOLOGY | Facility: HOSPICE | Age: 70
End: 2020-10-09

## 2020-10-09 RX ORDER — AZITHROMYCIN 250 MG/1
TABLET, FILM COATED ORAL
Qty: 6 TAB | Refills: 1 | Status: SHIPPED | OUTPATIENT
Start: 2020-10-09

## 2020-10-09 RX ORDER — PREDNISONE 10 MG/1
TABLET ORAL
Qty: 40 TAB | Refills: 1 | Status: SHIPPED | OUTPATIENT
Start: 2020-10-09

## 2020-10-09 NOTE — TELEPHONE ENCOUNTER
The patient called to check on the prednisone and abx.  I told him that they were segned off on today and he can pick them up at his pharmacy.

## 2020-10-09 NOTE — PROGRESS NOTES
Patient would like to have prednisone taper and Zithromax for COPD exacerbation on hand, orders placed and sent to pharmacy

## 2020-10-21 ENCOUNTER — APPOINTMENT (OUTPATIENT)
Dept: VASCULAR LAB | Facility: MEDICAL CENTER | Age: 70
End: 2020-10-21
Payer: MEDICARE

## 2020-11-18 ENCOUNTER — TELEPHONE (OUTPATIENT)
Dept: VASCULAR LAB | Facility: MEDICAL CENTER | Age: 70
End: 2020-11-18

## 2021-01-04 NOTE — ANESTHESIA TIME REPORT
Anesthesia Start and Stop Event Times     Date Time Event    3/11/2020 1103 Ready for Procedure     1126 Anesthesia Start     1332 Anesthesia Stop        Responsible Staff  03/11/20    Name Role Begin End    Joann Seay M.D. Anesth 1126 1332        Preop Diagnosis (Free Text):  Pre-op Diagnosis     ABDOMINAL ANEURYSM        Preop Diagnosis (Codes):    Post op Diagnosis  AAA (abdominal aortic aneurysm) (HCC)      Premium Reason  Non-Premium    Comments:                                                                        This document is complete and the patient is ready for discharge.

## 2021-01-16 DIAGNOSIS — Z23 NEED FOR VACCINATION: ICD-10-CM

## 2021-02-09 ENCOUNTER — APPOINTMENT (OUTPATIENT)
Dept: SLEEP MEDICINE | Facility: MEDICAL CENTER | Age: 71
End: 2021-02-09
Payer: MEDICARE

## 2021-05-13 ENCOUNTER — TELEPHONE (OUTPATIENT)
Dept: SLEEP MEDICINE | Facility: MEDICAL CENTER | Age: 71
End: 2021-05-13

## 2021-05-13 NOTE — TELEPHONE ENCOUNTER
Patient  Calling [employer] told him they could not send him any supplies for his oxygen equipment because they do not have a signed delivery ticket from patient. They said when they took over [employer] accounts patient refused to sign a ticket so they could take over billing.    I called and spoke to Allie she states they can provide cannula and tubing but need a NEW ORDER AND RE-QUALIFICATION FOR  OXYGEN. Basically he needs to start over. Patient does have a pending appointment with us for 6/25/21.     Meanwhile I mailed patient a couple of nasal cannulas to hold him over. Patient in agreement and understands his situation.

## 2021-06-15 ENCOUNTER — APPOINTMENT (OUTPATIENT)
Dept: SLEEP MEDICINE | Facility: MEDICAL CENTER | Age: 71
End: 2021-06-15
Payer: MEDICARE

## 2021-07-12 ENCOUNTER — TELEPHONE (OUTPATIENT)
Dept: SLEEP MEDICINE | Facility: MEDICAL CENTER | Age: 71
End: 2021-07-12

## 2021-07-12 NOTE — TELEPHONE ENCOUNTER
Caller: Rosemary    Phone Number: [phone] (home)     Message: Pt's spouse, Rosemary called and lm. Pt has an appt on 07/27/21.  The issue is we don't have any portable oxygen to get there.  He can't go without Oxygen.      07/12/21:  Called pt and lm. I was returning their call and asked for them to return our call.     Called and spoke with Nancy with [employer]. Notified pt needs Oxygen to go to his appt. On 07/27/21. She said they will supply pt with a couple of E-Tank to get him to his appt.     Called and spoke with pt. Notified pt of this. Supplied pt with their number.

## 2021-07-27 ENCOUNTER — APPOINTMENT (OUTPATIENT)
Dept: SLEEP MEDICINE | Facility: MEDICAL CENTER | Age: 71
End: 2021-07-27
Payer: MEDICARE

## 2021-09-09 DIAGNOSIS — J44.9 CHRONIC OBSTRUCTIVE PULMONARY DISEASE, UNSPECIFIED COPD TYPE (HCC): ICD-10-CM

## 2021-09-09 RX ORDER — ALBUTEROL SULFATE 90 UG/1
2 AEROSOL, METERED RESPIRATORY (INHALATION) EVERY 4 HOURS PRN
Qty: 1 EACH | Refills: 0 | Status: SHIPPED | OUTPATIENT
Start: 2021-09-09 | End: 2021-10-29 | Stop reason: SDUPTHER

## 2021-09-09 NOTE — TELEPHONE ENCOUNTER
Have we ever prescribed this med? Yes.  If yes, what date? 9/22/2020    Last OV: 08/07/2020 -     Next OV: none scheduled    DX: COPD    Medications: Ventolin    PT NEEDS OV FOR FURTHER FILLS

## 2021-10-29 ENCOUNTER — TELEPHONE (OUTPATIENT)
Dept: SLEEP MEDICINE | Facility: MEDICAL CENTER | Age: 71
End: 2021-10-29

## 2021-10-29 DIAGNOSIS — J44.9 CHRONIC OBSTRUCTIVE PULMONARY DISEASE, UNSPECIFIED COPD TYPE (HCC): ICD-10-CM

## 2021-10-29 RX ORDER — ALBUTEROL SULFATE 90 UG/1
2 AEROSOL, METERED RESPIRATORY (INHALATION) EVERY 4 HOURS PRN
Qty: 1 EACH | Refills: 11 | Status: SHIPPED | OUTPATIENT
Start: 2021-10-29

## 2021-10-29 RX ORDER — BUDESONIDE AND FORMOTEROL FUMARATE DIHYDRATE 160; 4.5 UG/1; UG/1
2 AEROSOL RESPIRATORY (INHALATION) 2 TIMES DAILY
Qty: 3 EACH | Refills: 11 | Status: SHIPPED | OUTPATIENT
Start: 2021-10-29

## 2022-12-05 DIAGNOSIS — J44.9 CHRONIC OBSTRUCTIVE PULMONARY DISEASE, UNSPECIFIED COPD TYPE (HCC): ICD-10-CM

## 2022-12-09 RX ORDER — BUDESONIDE AND FORMOTEROL FUMARATE DIHYDRATE 160; 4.5 UG/1; UG/1
AEROSOL RESPIRATORY (INHALATION)
Qty: 30.6 G | OUTPATIENT
Start: 2022-12-09

## 2022-12-09 NOTE — TELEPHONE ENCOUNTER
Refused by: REGGIE Iverson    Refusal reason: Patient needs appointment     Called pt to relay Sylvia LEIGH response regarding the refill request for budesonide-formoterol (SYMBICORT) 160-4.5 MCG/ACT Aerosol and I attempted to schedule the pt for an OV with one of the providers in the office but the pt stated that it is really hard for him to come into the office but he does not get out that much. I asked the pt if he would like to hold off on the apt and he stated yes because he does not know how he would get here. I advise the pt to give us a call back if he does decide to schedule the apt.

## 2022-12-09 NOTE — TELEPHONE ENCOUNTER
Have we ever prescribed this med? Yes.  If yes, what date? 10/29/2021    Last OV: 8/7/2020- Dr. Napier     Next OV: NONE     DX: Chronic obstructive pulmonary disease, unspecified COPD type (HCC) (J44.9)    Medications: budesonide-formoterol (SYMBICORT) 160-4.5 MCG/ACT Aerosol

## (undated) DEVICE — GLOVE BIOGEL SZ 6 PF LATEX - (50EA/BX 4BX/CA)

## (undated) DEVICE — STERI STRIP COMPOUND BENZOIN - TINCTURE 0.6ML WITH APPLICATOR (40EA/BX)

## (undated) DEVICE — SLEEVE, VASO, THIGH, MED

## (undated) DEVICE — KIT ANESTHESIA W/CIRCUIT & 3/LT BAG W/FILTER (20EA/CA)

## (undated) DEVICE — NEPTUNE 4 PORT MANIFOLD - (20/PK)

## (undated) DEVICE — HEAD HOLDER JUNIOR/ADULT

## (undated) DEVICE — GOWN WARMING STANDARD FLEX - (30/CA)

## (undated) DEVICE — KIT RADIAL ARTERY 20GA W/MAX BARRIER AND BIOPATCH  (5EA/CA) #10740 IS FOR THE SET RADIAL ARTERIAL

## (undated) DEVICE — PACK MINOR BASIN - (2EA/CA)

## (undated) DEVICE — MASK ANESTHESIA ADULT  - (100/CA)

## (undated) DEVICE — SUTURE GENERAL

## (undated) DEVICE — PACK MAJOR BASIN - (2EA/CA)

## (undated) DEVICE — SUTURE 3-0 VICRYL PLUS SH - 8X 18 INCH (12/BX)

## (undated) DEVICE — SODIUM CHL IRRIGATION 0.9% 1000ML (12EA/CA)

## (undated) DEVICE — WIRE GUIDE LUNDQST.035X180 - TSMG-35-180-4-LES ORDER BY BOX (5EA/BX)

## (undated) DEVICE — DRESSING TRANSPARENT FILM TEGADERM 4 X 4.75" (50EA/BX)"

## (undated) DEVICE — ELECTRODE DUAL RETURN W/ CORD - (50/PK)

## (undated) DEVICE — SODIUM CHL. INJ. 0.9% 500ML (24EA/CA 50CA/PF)

## (undated) DEVICE — SUTURE 2-0 VICRYL PLUS CT-1 36 (36PK/BX)"

## (undated) DEVICE — TUBE E-T HI-LO CUFF 7.5MM (10EA/PK)

## (undated) DEVICE — SURGIFOAM (SIZE 100) - (6EA/CA)

## (undated) DEVICE — VESSELOOP MAXI BLUE STERILE- SURG-I-LOOP (10EA/BX)

## (undated) DEVICE — TUBING CLEARLINK DUO-VENT - C-FLO (48EA/CA)

## (undated) DEVICE — MARKER SIZING OMNIFLUSH 5F 70 - 5/BX .035 20CM SEGMENT

## (undated) DEVICE — CLIP MED INTNL HRZN TI ESCP - (25/BX)

## (undated) DEVICE — VESSELOOP MINI BLUE STERILE - SURG-I-LOOP (10EA/BX)

## (undated) DEVICE — GLOVE BIOGEL PI ORTHO SZ 6 SURGICAL PF LF (40PR/BX)

## (undated) DEVICE — SUTURE 0 ETHIBOND MO6 C/R - (12/BX) 8-18 INCH ETHICON

## (undated) DEVICE — TRANSDUCER ADULT DISP. SINGLE BONDED STERILE - (20EA/CA)

## (undated) DEVICE — SHEATH RO 6F 10CM (10EA/BX)

## (undated) DEVICE — SYRINGE 30 ML LL (56/BX)

## (undated) DEVICE — BALLOON RELIANT

## (undated) DEVICE — SHEATH DRYSEAL FLEX INTRODUCER 16FR X 28CM

## (undated) DEVICE — SYRINGE DISP. 12 CC LL - (100/BX)

## (undated) DEVICE — CHLORAPREP 26 ML APPLICATOR - ORANGE TINT(25/CA)

## (undated) DEVICE — DECANTER FLD BLS - (50/CA)

## (undated) DEVICE — LACTATED RINGERS INJ 1000 ML - (14EA/CA 60CA/PF)

## (undated) DEVICE — BLADE SURGICAL CLIPPER - (50EA/CA)

## (undated) DEVICE — PROTECTOR ULNA NERVE - (36PR/CA)

## (undated) DEVICE — KIT ROOM DECONTAMINATION

## (undated) DEVICE — KIT SURGIFLO W/OUT THROMBIN - (6EA/CA)

## (undated) DEVICE — GLOVE BIOGEL SZ 8 SURGICAL PF LTX - (50PR/BX 4BX/CA)

## (undated) DEVICE — STAPLER SKIN DISP - (6/BX 10BX/CA) VISISTAT

## (undated) DEVICE — GLIDEWIRE ANGLED .035 X 180 (5EA/BX)

## (undated) DEVICE — SYRINGE SAFETY 10 ML 18 GA X 1 1/2 BLUNT LL (100/BX 4BX/CA)

## (undated) DEVICE — MULTI TORQUE DEVICE DISP (5EA/BX)

## (undated) DEVICE — SUTURE 4-0 30CM STRATAFIX SPIRAL PS-2 (12EA/BX)

## (undated) DEVICE — CLOSURE SKIN STRIP 1/2 X 4 IN - (STERI STRIP) (50/BX 4BX/CA)

## (undated) DEVICE — SET LEADWIRE 5 LEAD BEDSIDE DISPOSABLE ECG (1SET OF 5/EA)

## (undated) DEVICE — SUTURE 3-0 SILK 12 X 18 IN - (36/BX)

## (undated) DEVICE — ELECTRODE 850 FOAM ADHESIVE - HYDROGEL RADIOTRNSPRNT (50/PK)

## (undated) DEVICE — SHEET PEDIATRIC LAPAROTOMY - (10/CA)

## (undated) DEVICE — WATER IRRIG. STER. 1500 ML - (9/CA)

## (undated) DEVICE — DRAPE IOBAN II INCISE 23X17 - (10EA/BX 4BX/CA)

## (undated) DEVICE — SUTURE CV

## (undated) DEVICE — GOWN SURGEONS LARGE - (32/CA)

## (undated) DEVICE — SUCTION INSTRUMENT YANKAUER BULBOUS TIP W/O VENT (50EA/CA)

## (undated) DEVICE — SENSOR SPO2 NEO LNCS ADHESIVE (20/BX) SEE USER NOTES

## (undated) DEVICE — CANISTER SUCTION 3000ML MECHANICAL FILTER AUTO SHUTOFF MEDI-VAC NONSTERILE LF DISP  (40EA/CA)

## (undated) DEVICE — GOWN SURGEONS X-LARGE - DISP. (30/CA)

## (undated) DEVICE — SHEATH DRYSEAL FLEX INTRODUCER 12FR

## (undated) DEVICE — NEEDLE THINWALL 19GA X 7CM

## (undated) DEVICE — DRAIN PENROSE STERILE 1/4 X - 18 IN  (25EA/BX)

## (undated) DEVICE — SPONGE GAUZESTER 4 X 4 4PLY - (128PK/CA)

## (undated) DEVICE — SYRINGE 10 ML CONTROL LL (25EA/BX 4BX/CA)

## (undated) DEVICE — TOWELS CLOTH SURGICAL - (4/PK 20PK/CA)

## (undated) DEVICE — CLIP SM INTNL HRZN TI ESCP LGT - (24EA/PK 25PK/BX)

## (undated) DEVICE — SET EXTENSION WITH 2 PORTS (48EA/CA) ***PART #2C8610 IS A SUBSTITUTE*****